# Patient Record
Sex: FEMALE | Race: OTHER | NOT HISPANIC OR LATINO | Employment: FULL TIME | URBAN - METROPOLITAN AREA
[De-identification: names, ages, dates, MRNs, and addresses within clinical notes are randomized per-mention and may not be internally consistent; named-entity substitution may affect disease eponyms.]

---

## 2017-08-01 ENCOUNTER — ALLSCRIPTS OFFICE VISIT (OUTPATIENT)
Dept: OTHER | Facility: OTHER | Age: 47
End: 2017-08-01

## 2017-08-01 LAB
BILIRUB UR QL STRIP: NORMAL
CLARITY UR: NORMAL
COLOR UR: YELLOW
GLUCOSE (HISTORICAL): NORMAL
HGB UR QL STRIP.AUTO: NORMAL
KETONES UR STRIP-MCNC: NORMAL MG/DL
LEUKOCYTE ESTERASE UR QL STRIP: NORMAL
NITRITE UR QL STRIP: NORMAL
PH UR STRIP.AUTO: 6 [PH]
PROT UR STRIP-MCNC: NORMAL MG/DL
SP GR UR STRIP.AUTO: 1.01
UROBILINOGEN UR QL STRIP.AUTO: 0.2

## 2017-09-05 ENCOUNTER — GENERIC CONVERSION - ENCOUNTER (OUTPATIENT)
Dept: OTHER | Facility: OTHER | Age: 47
End: 2017-09-05

## 2017-09-05 ENCOUNTER — ALLSCRIPTS OFFICE VISIT (OUTPATIENT)
Dept: OTHER | Facility: OTHER | Age: 47
End: 2017-09-05

## 2017-09-14 ENCOUNTER — GENERIC CONVERSION - ENCOUNTER (OUTPATIENT)
Dept: OTHER | Facility: OTHER | Age: 47
End: 2017-09-14

## 2018-01-12 VITALS
WEIGHT: 226 LBS | HEIGHT: 70 IN | BODY MASS INDEX: 32.35 KG/M2 | SYSTOLIC BLOOD PRESSURE: 157 MMHG | DIASTOLIC BLOOD PRESSURE: 80 MMHG

## 2018-01-13 VITALS — BODY MASS INDEX: 33.14 KG/M2 | DIASTOLIC BLOOD PRESSURE: 90 MMHG | WEIGHT: 231 LBS | SYSTOLIC BLOOD PRESSURE: 149 MMHG

## 2018-01-15 NOTE — MISCELLANEOUS
Message  [facility] called requesting refill of Aldatcone 100mg BID  RX  authorized for additional 3 month supply      Active Problems    1  Acute Cystitis (595 0)   2  Adult Gender Identity Disorder (302 85)   3  Gender dysphoria (302 6) (F64 9)   4  Skin disorder (709 9) (L98 9)   5  Urinary tract infection (599 0) (N39 0)   6  Vaginal infection (616 10) (N76 0)   7  Weight gain (783 1) (R63 5)    Current Meds   1  Aldactone 100 MG Oral Tablet (Spironolactone); TAKE ONE TABLET BY MOUTH TWICE   DAILY; Therapy: 04EWO7369 to (Last Rx:48Fgh6775)  Requested for: 63Bxq2609 Ordered   2  Aspirin 81 MG Oral Tablet; Therapy: 47LTZ1672 to Recorded   3  BD Luer-Oly Syringe 21G X 1" 3 ML Miscellaneous; USE AS DIRECTED; Therapy: 99ZNX7642 to (Last Rx:05Jan2015)  Requested for: 39ICF4130 Ordered   4  BD Luer-Oly Syringe 21G X 1" 3 ML Miscellaneous; USE AS DIRECTED; Therapy: 63QKN7062 to (Last Rx:11Jan2016)  Requested for: 85AOA5919 Ordered   5  Claritin 10 MG Oral Tablet; Therapy: (Recorded:16Oct2014) to Recorded   6  Estradiol Valerate 20 MG/ML Intramuscular Oil (Delestrogen); inject 1 ml IM every two   weeks; Therapy: 73Kjp7390 to (Evaluate:89Hni5743)  Requested for: 71GQF3237; Last   Rx:99Xwq5579 Ordered   7  Estradiol Valerate 20 MG/ML Intramuscular Oil; Inject 1ml Intramuscularly every 2 weeks; Therapy: 84PID3244 to (FWPTKXPI:43TDB7972)  Requested for: 27Jun2014; Last   Rx:27Jun2014 Ordered   8  Estradiol Valerate 40 MG/ML Intramuscular Oil; INJECT 1 ML INTRAMUSCULARLY   EVERY 2 WEEKS; Therapy: 69ZIQ0448 to (Evaluate:65Hxb6224)  Requested for: 10XLK7729; Last   Rx:88Vub6063 Ordered   9  Fenofibrate 160 MG Oral Tablet; Therapy: 92JGW2687 to (Last Rx:91Zqz2099)  Requested for: 45Lqo9400 Ordered   10  Ibuprofen 600 MG Oral Tablet; Therapy: 04BJW8167 to (Last Rx:08Feb2012)  Requested for: 25RDE6070 Ordered   11  Ibuprofen 800 MG Oral Tablet;     Therapy: 05ZEY8056 to (Last Rx:28Nov2011) Requested for: 63MOM7636 Ordered   12  Levofloxacin 500 MG Oral Tablet; Therapy: 85FNW3751 to (Last Rx:90Pos2839)  Requested for: 70Fiz6973 Ordered   13  Levothyroxine Sodium 175 MCG Oral Tablet; Therapy: 77KHB0205 to Recorded   14  Naproxen 500 MG Oral Tablet; TAKE 1 TABLET 3 TIMES DAILY AS NEEDED; Therapy: 15FMR3592 to (Evaluate:59Amg6563); Last Rx:29Sth1399 Ordered   15  Naproxen 500 MG Oral Tablet; Therapy: 96IHK4794 to Recorded   16  Ofloxacin 0 3 % Otic Solution; Therapy: 77NGD6137 to (Last [facility])  Requested for: 33WCO4242 Ordered   17  Phentermine HCl - 37 5 MG Oral Capsule (Adipex-P); TAKE 1 CAPSULE EVERY    MORNING BEFORE BREAKFAST; Therapy: 44XQU1328 to (Evaluate:52Fra7324); Last Rx:55Kgz5104 Ordered   18  Phentermine HCl - 37 5 MG Oral Tablet (Adipex-P); TAKE 1 TABLET DAILY AS    DIRECTED; Therapy: 52PMX6194 to (Evaluate:14Jan2015); Last Rx:47Rxi8221 Ordered   19  Premarin 1 25 MG Oral Tablet; TAKE TWO TABS TWICE A DAY; Therapy: 61ACS0502 to (Last Rx:23Gch1619) Ordered   20  Spironolactone 100 MG Oral Tablet (Aldactone); TAKE 1 TABLET TWICE DAILY; Therapy: 50JXA3257 to (Miguel Ángel Shown)  Requested for: 90OWW1680; Last    Rx:56Tqx1031; Status: ACTIVE - Retrospective Authorization Ordered   21  Spironolactone 100 MG Oral Tablet; Therapy: 09DKF9158 to (Last Rx:97Mpg8398)  Requested for: 32VHT5130 Ordered   22  Sulfamethoxazole-TMP -160 MG TABS; TAKE 1 TABLET TWICE DAILY; Therapy: 48ROX6775 to (Evaluate:07Rde2307); Last Rx:40Lfh9906 Ordered   23  Sulfamethoxazole-Trimethoprim 800-160 MG Oral Tablet (Bactrim DS); one tab po bid    for 7 days; Therapy: 39UKJ0027 to (Evaluate:12Dps4728)  Requested for: 68CMR6305; Last    Rx:50Jdm0259 Ordered   24  Sulfamethoxazole-Trimethoprim 800-160 MG Oral Tablet (Bactrim DS); one tab po bid    for 7 days; Therapy: 57OCJ7137 to (Evaluate:20Tgy9094)  Requested for: 28IZF7786; Last    Rx:19Wmx3021 Ordered   25   Syringe 21G X 1-1/2" 3 ML Miscellaneous; USE AS DIRECTED; Therapy: 75TVU7966 to (Last Rx:15Ska5094) Ordered   26  Syringe 21G X 1-1/2" 3 ML Miscellaneous; USE AS DIRECTED; Therapy: 85ELQ3923 to (Last Rx:36Ukx6926)  Requested for: 09Wqk1866 Ordered   27  Topiramate 50 MG Oral Tablet; Therapy: 15OAB9479 to (Last Ace James)  Requested for: 74Uxt7272 Ordered   28  Ventolin  (90 Base) MCG/ACT Inhalation Aerosol Solution; Therapy: 01KPE3026 to (Last Rx:41Jnf0738)  Requested for: 93Rvi4569 Ordered   29  Zoloft 25 MG Oral Tablet (Sertraline HCl); Therapy: 76SHG1909 to Recorded    Allergies    1  Penicillins    2   FRUIT    Signatures   Electronically signed by : Renata Bar RN; Feb 9 2016 12:23PM EST                       (Author)

## 2018-01-18 NOTE — MISCELLANEOUS
Message  additional 3 month supply of spironalactone 100mg BID called to [facility] pharmacy per request      Active Problems    1  Acute cystitis (595 0) (N30 00)   2  Adolescent or adult gender identity disorder (302 85) (F64 0)   3  Bronchitis with bronchospasm (490) (J20 9)   4  Gender dysphoria (302 6)   5  Skin disorder (709 9) (L98 9)   6  Urinary tract infection (599 0) (N39 0)   7  Vaginal infection (616 10) (N76 0)   8  Vaginitis (616 10) (N76 0)   9  Weight gain (783 1) (R63 5)    Current Meds   1  Aldactone 100 MG Oral Tablet (Spironolactone); TAKE ONE TABLET BY MOUTH TWICE   DAILY; Therapy: 66BEH9470 to (Last Rx:15Sep2011)  Requested for: 15Sep2011 Ordered   2  Aspirin 81 MG TABS; Therapy: 88GRE7277 to Recorded   3  Azithromycin 250 MG Oral Tablet (Zithromax Z-Martínez); TAKE AS DIRECTED; Therapy: 55GOR3519 to (Last Rx:05Sep2017)  Requested for: 05Sep2017 Ordered   4  BD Luer-Oly Syringe 21G X 1" 3 ML Miscellaneous; USE AS DIRECTED; Therapy: 03JSI6367 to (Last Rx:05Jan2015)  Requested for: 49JXC6788 Ordered   5  BD Luer-Oly Syringe 21G X 1" 3 ML Miscellaneous; USE AS DIRECTED; Therapy: 84KCH5216 to (Last Rx:11Jan2016)  Requested for: 63CUQ9793 Ordered   6  Claritin 10 MG Oral Tablet; Therapy: (Recorded:16Oct2014) to Recorded   7  Estrace 0 1 MG/GM Vaginal Cream; One vaginal application twice daily for 1 week then   once daily for the following week; Therapy: 47GVR7529 to (Last Rx:05Sep2017)  Requested for: 05Sep2017 Ordered   8  Estradiol Valerate 20 MG/ML Intramuscular Oil; Inject 1 ml every week; Therapy: 57EUP9373 to (Evaluate:42Hxo4169); Last Rx:05Sep2017 Ordered   9  Fenofibrate 160 MG Oral Tablet; Therapy: 44TMQ0194 to (Last Rx:02Feb2012)  Requested for: 64Aou6498 Ordered   10  Ibuprofen 600 MG Oral Tablet; Therapy: 75FZQ0096 to (Last Rx:60Iep2340)  Requested for: 08MXF6621 Ordered   11  Ibuprofen 800 MG Oral Tablet;     Therapy: 40ILX4019 to (Last Deborah Flow)  Requested for: 46FYC6448 Ordered   12  LevoFLOXacin 500 MG Oral Tablet; Therapy: 61UTQ2788 to (Last Rx:24Nrq5105)  Requested for: 09Aqr0556 Ordered   13  Levothyroxine Sodium 175 MCG Oral Tablet; Therapy: 00TAM0768 to Recorded   14  Naproxen 500 MG Oral Tablet; Therapy: 76UDX4600 to Recorded   15  Ofloxacin 0 3 % Otic Solution; Therapy: 27AEU9917 to (Last Sammy Antony)  Requested for: 11CUD4810 Ordered   16  Phentermine HCl - 37 5 MG Oral Capsule (Adipex-P); TAKE 1 CAPSULE EVERY    MORNING BEFORE BREAKFAST; Therapy: 21EKF8971 to (Evaluate:26Jjm2805); Last Rx:03Kjk9951 Ordered   17  Phentermine HCl - 37 5 MG Oral Tablet (Adipex-P); TAKE 1 TABLET DAILY AS    DIRECTED; Therapy: 71HYR5136 to (Evaluate:14Jan2015); Last Rx:60Mlv8905 Ordered   18  Premarin 1 25 MG Oral Tablet; take 2 tablet twice daily; Therapy: 96FYS0072 to ([facility])  Requested for: 74UJF2125; Last    Rx:06Jan2017 Ordered   19  Spironolactone 100 MG Oral Tablet; Therapy: 30BBC7430 to (Last Rx:08Pjs1197)  Requested for: 56MAB2733 Ordered   20  Syringe 21G X 1-1/2" 3 ML Miscellaneous; USE AS DIRECTED; Therapy: 05VXL8882 to (Last Rx:12Qxk9784) Ordered   21  Syringe 21G X 1-1/2" 3 ML Miscellaneous; USE AS DIRECTED; Therapy: 97ZRT6918 to (Last Rx:49Oog0703)  Requested for: 27Kfq7608 Ordered   22  Syringe 23G X 1" 3 ML Miscellaneous; USE AS DIRECTED; Therapy: 52GGT1756 to (Last Rx:30Exp1614)  Requested for: 01Zte6476 Ordered   23  Topiramate 50 MG Oral Tablet; Therapy: 23DAU2689 to (Last Vijay Krueger)  Requested for: 75Nry5722 Ordered   24  Ventolin  (90 Base) MCG/ACT Inhalation Aerosol Solution; Therapy: 65BLO6179 to (Last Rx:09Sax6011)  Requested for: 15Snm9225 Ordered   25  Zoloft 25 MG Oral Tablet (Sertraline HCl); Therapy: 44PUQ1464 to Recorded    Allergies    1  Keflex CAPS   2  Penicillins    3  Bee sting   4   FRUIT    Signatures   Electronically signed by : Elsa Payne RN; Sep 14 2017 12:22PM EST (Author)

## 2018-02-05 DIAGNOSIS — Z78.9 TRANSGENDER: Primary | ICD-10-CM

## 2018-02-08 RX ORDER — ESTROGENS, CONJUGATED 1.25 MG
TABLET ORAL
Qty: 360 TABLET | Refills: 3 | Status: SHIPPED | OUTPATIENT
Start: 2018-02-08 | End: 2019-04-12 | Stop reason: SDUPTHER

## 2018-02-20 ENCOUNTER — OFFICE VISIT (OUTPATIENT)
Dept: OBGYN CLINIC | Facility: CLINIC | Age: 48
End: 2018-02-20
Payer: COMMERCIAL

## 2018-02-20 VITALS
WEIGHT: 232 LBS | DIASTOLIC BLOOD PRESSURE: 75 MMHG | SYSTOLIC BLOOD PRESSURE: 147 MMHG | HEIGHT: 69 IN | BODY MASS INDEX: 34.36 KG/M2

## 2018-02-20 DIAGNOSIS — R39.9 UTI SYMPTOMS: Primary | ICD-10-CM

## 2018-02-20 DIAGNOSIS — J06.9 URI WITH COUGH AND CONGESTION: ICD-10-CM

## 2018-02-20 LAB
SL AMB  POCT GLUCOSE, UA: 4
SL AMB LEUKOCYTE ESTERASE,UA: NORMAL
SL AMB POCT BILIRUBIN,UA: NORMAL
SL AMB POCT BLOOD,UA: NORMAL
SL AMB POCT CLARITY,UA: NORMAL
SL AMB POCT COLOR,UA: NORMAL
SL AMB POCT KETONES,UA: NORMAL
SL AMB POCT NITRITE,UA: NORMAL
SL AMB POCT PH,UA: 6.5
SL AMB POCT SPECIFIC GRAVITY,UA: 1.02
SL AMB POCT URINE PROTEIN: NORMAL
SL AMB POCT UROBILINOGEN: 0.2

## 2018-02-20 PROCEDURE — 99214 OFFICE O/P EST MOD 30 MIN: CPT | Performed by: OBSTETRICS & GYNECOLOGY

## 2018-02-20 PROCEDURE — 87086 URINE CULTURE/COLONY COUNT: CPT | Performed by: OBSTETRICS & GYNECOLOGY

## 2018-02-20 PROCEDURE — 81002 URINALYSIS NONAUTO W/O SCOPE: CPT | Performed by: OBSTETRICS & GYNECOLOGY

## 2018-02-20 RX ORDER — ASPIRIN 81 MG/1
81 TABLET ORAL DAILY
COMMUNITY
End: 2019-08-28 | Stop reason: SDUPTHER

## 2018-02-20 RX ORDER — LEVOTHYROXINE SODIUM 175 UG/1
175 TABLET ORAL DAILY
COMMUNITY
End: 2018-08-22 | Stop reason: SDUPTHER

## 2018-02-20 RX ORDER — AZITHROMYCIN 250 MG/1
TABLET, FILM COATED ORAL
Qty: 6 TABLET | Refills: 0 | Status: SHIPPED | OUTPATIENT
Start: 2018-02-20 | End: 2018-02-25

## 2018-02-20 RX ORDER — ARIPIPRAZOLE 10 MG/1
10 TABLET ORAL DAILY
COMMUNITY
End: 2019-04-11

## 2018-02-20 RX ORDER — SPIRONOLACTONE 100 MG/1
100 TABLET, FILM COATED ORAL 2 TIMES DAILY
COMMUNITY
End: 2018-10-15

## 2018-02-20 RX ORDER — ESTRADIOL VALERATE 40 MG/ML
40 INJECTION INTRAMUSCULAR
COMMUNITY
End: 2018-07-20 | Stop reason: SDUPTHER

## 2018-02-20 RX ORDER — SULFAMETHOXAZOLE AND TRIMETHOPRIM 800; 160 MG/1; MG/1
TABLET ORAL
Qty: 14 TABLET | Refills: 0 | Status: SHIPPED | OUTPATIENT
Start: 2018-02-20 | End: 2018-07-20

## 2018-02-20 NOTE — PROGRESS NOTES
HPI: Pt c/o urinary burning and urgerncy for the last several days  Denies fever or chills   Also with dry cough     ROS: AS Per HPI    Exam:  Heent: wnl  Lungs few crackles a right base Rhonci through out  Abdomen Soft  CVA: non tender    A: UTI      URI    Plan: Bactrim DS            Urinary culture             Z pac            Need to get labs to chart form PCP

## 2018-02-22 LAB — BACTERIA UR CULT: NORMAL

## 2018-07-20 ENCOUNTER — OFFICE VISIT (OUTPATIENT)
Dept: OBGYN CLINIC | Facility: HOSPITAL | Age: 48
End: 2018-07-20
Payer: COMMERCIAL

## 2018-07-20 VITALS
DIASTOLIC BLOOD PRESSURE: 83 MMHG | SYSTOLIC BLOOD PRESSURE: 144 MMHG | HEIGHT: 71 IN | HEART RATE: 99 BPM | WEIGHT: 228.2 LBS | BODY MASS INDEX: 31.95 KG/M2

## 2018-07-20 DIAGNOSIS — Z11.3 SCREEN FOR SEXUALLY TRANSMITTED DISEASES: ICD-10-CM

## 2018-07-20 DIAGNOSIS — J40 BRONCHITIS: ICD-10-CM

## 2018-07-20 DIAGNOSIS — R39.89 POSSIBLE URINARY TRACT INFECTION: ICD-10-CM

## 2018-07-20 DIAGNOSIS — A64 SEXUALLY TRANSMITTED DISEASE (STD): ICD-10-CM

## 2018-07-20 DIAGNOSIS — Z78.9 TRANSGENDER: Primary | ICD-10-CM

## 2018-07-20 LAB
SL AMB  POCT GLUCOSE, UA: ABNORMAL
SL AMB LEUKOCYTE ESTERASE,UA: ABNORMAL
SL AMB POCT BILIRUBIN,UA: ABNORMAL
SL AMB POCT BLOOD,UA: ABNORMAL
SL AMB POCT CLARITY,UA: CLEAR
SL AMB POCT COLOR,UA: YELLOW
SL AMB POCT KETONES,UA: ABNORMAL
SL AMB POCT NITRITE,UA: ABNORMAL
SL AMB POCT PH,UA: 6.5
SL AMB POCT SPECIFIC GRAVITY,UA: 1.01
SL AMB POCT URINE PROTEIN: ABNORMAL
SL AMB POCT UROBILINOGEN: 0.2

## 2018-07-20 PROCEDURE — 87591 N.GONORRHOEAE DNA AMP PROB: CPT | Performed by: OBSTETRICS & GYNECOLOGY

## 2018-07-20 PROCEDURE — 99214 OFFICE O/P EST MOD 30 MIN: CPT | Performed by: OBSTETRICS & GYNECOLOGY

## 2018-07-20 PROCEDURE — 87491 CHLMYD TRACH DNA AMP PROBE: CPT | Performed by: OBSTETRICS & GYNECOLOGY

## 2018-07-20 PROCEDURE — 87086 URINE CULTURE/COLONY COUNT: CPT | Performed by: OBSTETRICS & GYNECOLOGY

## 2018-07-20 PROCEDURE — 81002 URINALYSIS NONAUTO W/O SCOPE: CPT | Performed by: OBSTETRICS & GYNECOLOGY

## 2018-07-20 RX ORDER — ASCORBIC ACID 500 MG
1000 TABLET ORAL DAILY
COMMUNITY
End: 2019-04-11

## 2018-07-20 RX ORDER — DIPHENOXYLATE HYDROCHLORIDE AND ATROPINE SULFATE 2.5; .025 MG/1; MG/1
1 TABLET ORAL DAILY
COMMUNITY
End: 2019-11-14

## 2018-07-20 RX ORDER — METRONIDAZOLE 500 MG/1
500 TABLET ORAL EVERY 8 HOURS SCHEDULED
Qty: 10 TABLET | Refills: 0 | Status: SHIPPED | OUTPATIENT
Start: 2018-07-20 | End: 2018-07-30

## 2018-07-20 RX ORDER — MELATONIN
1000 DAILY
COMMUNITY

## 2018-07-20 RX ORDER — AZITHROMYCIN 250 MG/1
TABLET, FILM COATED ORAL
Qty: 6 TABLET | Refills: 0 | Status: SHIPPED | OUTPATIENT
Start: 2018-07-20 | End: 2018-07-24

## 2018-07-20 RX ORDER — ESTRADIOL VALERATE 40 MG/ML
40 INJECTION INTRAMUSCULAR
Qty: 5 ML | Refills: 3 | Status: SHIPPED | OUTPATIENT
Start: 2018-07-20 | End: 2018-12-07 | Stop reason: SDUPTHER

## 2018-07-20 NOTE — PROGRESS NOTES
This well know TRF c/o vaginal discharge, cough, wanting STD screening    Exam:  heent WNL  LUNGS: SOME CRACKLES  PELVIC:   VAG: SOME AREAS OF EROSIONS    VAG DISCHARGE:     A: bRONCHITIS      VAG EROSIONS  PLAN:   STD screening   BV: Metronidazole   Bronhitis: Z pac   Vag erosions: Premarin   Blood scrrens

## 2018-07-21 LAB — BACTERIA UR CULT: NORMAL

## 2018-07-25 LAB
CHLAMYDIA DNA CVX QL NAA+PROBE: NORMAL
N GONORRHOEA DNA GENITAL QL NAA+PROBE: NORMAL

## 2018-07-30 ENCOUNTER — TELEPHONE (OUTPATIENT)
Dept: OBGYN CLINIC | Facility: HOSPITAL | Age: 48
End: 2018-07-30

## 2018-07-30 NOTE — TELEPHONE ENCOUNTER
Attempted to leave a voicemail as friendly reminder to complete active labs  No answer, unable to leave voicemail

## 2018-07-31 ENCOUNTER — APPOINTMENT (OUTPATIENT)
Dept: LAB | Facility: HOSPITAL | Age: 48
End: 2018-07-31
Attending: OBSTETRICS & GYNECOLOGY
Payer: COMMERCIAL

## 2018-07-31 ENCOUNTER — TRANSCRIBE ORDERS (OUTPATIENT)
Dept: LAB | Facility: HOSPITAL | Age: 48
End: 2018-07-31

## 2018-07-31 DIAGNOSIS — Z11.3 SCREEN FOR SEXUALLY TRANSMITTED DISEASES: ICD-10-CM

## 2018-07-31 DIAGNOSIS — Z78.9 TRANSGENDER: ICD-10-CM

## 2018-07-31 DIAGNOSIS — Z11.3 SCREENING EXAMINATION FOR VENEREAL DISEASE: Primary | ICD-10-CM

## 2018-07-31 LAB
ALBUMIN SERPL BCP-MCNC: 3.5 G/DL (ref 3.5–5)
ALP SERPL-CCNC: 56 U/L (ref 46–116)
ALT SERPL W P-5'-P-CCNC: 25 U/L (ref 12–78)
ANION GAP SERPL CALCULATED.3IONS-SCNC: 10 MMOL/L (ref 4–13)
AST SERPL W P-5'-P-CCNC: 14 U/L (ref 5–45)
BILIRUB SERPL-MCNC: 0.29 MG/DL (ref 0.2–1)
BUN SERPL-MCNC: 11 MG/DL (ref 5–25)
CALCIUM SERPL-MCNC: 9 MG/DL (ref 8.3–10.1)
CHLORIDE SERPL-SCNC: 97 MMOL/L (ref 100–108)
CHOLEST SERPL-MCNC: 415 MG/DL (ref 50–200)
CO2 SERPL-SCNC: 22 MMOL/L (ref 21–32)
CREAT SERPL-MCNC: 0.66 MG/DL (ref 0.6–1.3)
ERYTHROCYTE [DISTWIDTH] IN BLOOD BY AUTOMATED COUNT: 12.5 % (ref 11.6–15.1)
GFR SERPL CREATININE-BSD FRML MDRD: 106 ML/MIN/1.73SQ M
GLUCOSE P FAST SERPL-MCNC: 186 MG/DL (ref 65–99)
HCT VFR BLD AUTO: 41.5 % (ref 34.8–46.1)
HDLC SERPL-MCNC: 27 MG/DL (ref 40–60)
HGB BLD-MCNC: 13.5 G/DL (ref 11.5–15.4)
MCH RBC QN AUTO: 31 PG (ref 26.8–34.3)
MCHC RBC AUTO-ENTMCNC: 32.5 G/DL (ref 31.4–37.4)
MCV RBC AUTO: 95 FL (ref 82–98)
NONHDLC SERPL-MCNC: 388 MG/DL
PLATELET # BLD AUTO: 360 THOUSANDS/UL (ref 149–390)
PMV BLD AUTO: 10.7 FL (ref 8.9–12.7)
POTASSIUM SERPL-SCNC: 3.8 MMOL/L (ref 3.5–5.3)
PROT SERPL-MCNC: 7.4 G/DL (ref 6.4–8.2)
RBC # BLD AUTO: 4.36 MILLION/UL (ref 3.81–5.12)
SODIUM SERPL-SCNC: 129 MMOL/L (ref 136–145)
TRIGL SERPL-MCNC: 2439 MG/DL
WBC # BLD AUTO: 8.96 THOUSAND/UL (ref 4.31–10.16)

## 2018-07-31 PROCEDURE — 87389 HIV-1 AG W/HIV-1&-2 AB AG IA: CPT

## 2018-07-31 PROCEDURE — 80053 COMPREHEN METABOLIC PANEL: CPT

## 2018-07-31 PROCEDURE — 80061 LIPID PANEL: CPT

## 2018-07-31 PROCEDURE — 86592 SYPHILIS TEST NON-TREP QUAL: CPT

## 2018-07-31 PROCEDURE — 85027 COMPLETE CBC AUTOMATED: CPT

## 2018-07-31 PROCEDURE — 36415 COLL VENOUS BLD VENIPUNCTURE: CPT

## 2018-08-01 LAB
HIV 1+2 AB+HIV1 P24 AG SERPL QL IA: NORMAL
RPR SER QL: NORMAL

## 2018-08-10 ENCOUNTER — OFFICE VISIT (OUTPATIENT)
Dept: OBGYN CLINIC | Facility: HOSPITAL | Age: 48
End: 2018-08-10
Payer: COMMERCIAL

## 2018-08-10 VITALS
BODY MASS INDEX: 32.07 KG/M2 | WEIGHT: 224 LBS | DIASTOLIC BLOOD PRESSURE: 72 MMHG | SYSTOLIC BLOOD PRESSURE: 157 MMHG | HEART RATE: 114 BPM | HEIGHT: 70 IN

## 2018-08-10 DIAGNOSIS — F64.0: ICD-10-CM

## 2018-08-10 DIAGNOSIS — E78.1 HYPERTRIGLYCERIDEMIA: Primary | ICD-10-CM

## 2018-08-10 DIAGNOSIS — R73.02 GLUCOSE INTOLERANCE (IMPAIRED GLUCOSE TOLERANCE): ICD-10-CM

## 2018-08-10 PROCEDURE — 99214 OFFICE O/P EST MOD 30 MIN: CPT | Performed by: OBSTETRICS & GYNECOLOGY

## 2018-08-10 NOTE — PROGRESS NOTES
This is a 52 yr TGF here for follow up visit for labs    Glucose FBS: 186  Lipids: TG 2300, Cholesterol 410    Assesment:    Hypertrig, elvated BS    Plan;  Diet counseling/TG adjustment   Pt refuse decrease in Estrogen dose,    Rtn in one month after seeing PCP

## 2018-08-22 DIAGNOSIS — E03.8 OTHER SPECIFIED HYPOTHYROIDISM: Primary | ICD-10-CM

## 2018-08-22 RX ORDER — LEVOTHYROXINE SODIUM 175 UG/1
175 TABLET ORAL DAILY
Refills: 0 | Status: CANCELLED | OUTPATIENT
Start: 2018-08-22

## 2018-08-22 RX ORDER — LEVOTHYROXINE SODIUM 0.15 MG/1
150 TABLET ORAL DAILY
Qty: 30 TABLET | Refills: 0 | Status: SHIPPED | OUTPATIENT
Start: 2018-08-22 | End: 2018-08-25

## 2018-08-22 RX ORDER — LEVOTHYROXINE SODIUM 0.15 MG/1
150 TABLET ORAL DAILY
Qty: 30 TABLET | Refills: 0 | Status: SHIPPED | OUTPATIENT
Start: 2018-08-22 | End: 2018-08-27 | Stop reason: SDUPTHER

## 2018-08-22 NOTE — PROGRESS NOTES
Pt requesting refill for Levothyroxine 150 mcg, ran out 3 days ago  Has appt to see Dr Pacheco Ellis 9/25/18  Pt needs recent TSH to adjust the levothyroxine dose if needed

## 2018-08-25 DIAGNOSIS — E78.2 MIXED HYPERLIPIDEMIA: Primary | ICD-10-CM

## 2018-08-25 RX ORDER — FENOFIBRATE 145 MG/1
145 TABLET, COATED ORAL DAILY
Qty: 30 TABLET | Refills: 3 | Status: SHIPPED | OUTPATIENT
Start: 2018-08-25 | End: 2018-10-15 | Stop reason: SDUPTHER

## 2018-08-27 DIAGNOSIS — M54.50 ACUTE BILATERAL LOW BACK PAIN WITHOUT SCIATICA: Primary | ICD-10-CM

## 2018-08-27 DIAGNOSIS — E03.8 OTHER SPECIFIED HYPOTHYROIDISM: ICD-10-CM

## 2018-08-27 RX ORDER — LEVOTHYROXINE SODIUM 0.15 MG/1
150 TABLET ORAL DAILY
Qty: 90 TABLET | Refills: 1 | Status: SHIPPED | OUTPATIENT
Start: 2018-08-27 | End: 2018-09-24 | Stop reason: SDUPTHER

## 2018-08-27 RX ORDER — NAPROXEN 500 MG/1
250 TABLET ORAL 2 TIMES DAILY WITH MEALS
Qty: 14 TABLET | Refills: 0 | Status: SHIPPED | OUTPATIENT
Start: 2018-08-27 | End: 2019-04-11

## 2018-08-27 NOTE — TELEPHONE ENCOUNTER
Pt is as well requesting some type of pain medication for her arm and back pain  Pt would like if you can send a 90 day supply for her levothyroxine  I have schedule pt to see dr Gustavo Matthew for Friday since was requesting to have an appt sooner than 9/27/2018 since she will be starting a new job and wont be able to take off of work

## 2018-08-31 ENCOUNTER — OFFICE VISIT (OUTPATIENT)
Dept: FAMILY MEDICINE CLINIC | Facility: CLINIC | Age: 48
End: 2018-08-31
Payer: COMMERCIAL

## 2018-08-31 VITALS
TEMPERATURE: 97.4 F | BODY MASS INDEX: 31.71 KG/M2 | OXYGEN SATURATION: 98 % | SYSTOLIC BLOOD PRESSURE: 152 MMHG | HEART RATE: 93 BPM | DIASTOLIC BLOOD PRESSURE: 94 MMHG | RESPIRATION RATE: 16 BRPM | WEIGHT: 221 LBS

## 2018-08-31 DIAGNOSIS — R73.02 GLUCOSE INTOLERANCE (IMPAIRED GLUCOSE TOLERANCE): ICD-10-CM

## 2018-08-31 DIAGNOSIS — E03.9 HYPOTHYROIDISM: Primary | ICD-10-CM

## 2018-08-31 DIAGNOSIS — E11.9 DM (DIABETES MELLITUS) (HCC): ICD-10-CM

## 2018-08-31 DIAGNOSIS — E78.5 HYPERLIPIDEMIA, UNSPECIFIED HYPERLIPIDEMIA TYPE: ICD-10-CM

## 2018-08-31 PROCEDURE — 99213 OFFICE O/P EST LOW 20 MIN: CPT | Performed by: FAMILY MEDICINE

## 2018-08-31 RX ORDER — LEVOTHYROXINE SODIUM 175 UG/1
TABLET ORAL
COMMUNITY
Start: 2015-07-14 | End: 2018-09-25 | Stop reason: SDUPTHER

## 2018-08-31 RX ORDER — FENOFIBRATE 160 MG/1
TABLET ORAL
COMMUNITY
Start: 2012-02-02 | End: 2018-10-15

## 2018-08-31 RX ORDER — ESTRADIOL VALERATE 20 MG/ML
INJECTION INTRAMUSCULAR
COMMUNITY
Start: 2017-09-05 | End: 2019-03-01

## 2018-08-31 RX ORDER — ALBUTEROL SULFATE 90 UG/1
AEROSOL, METERED RESPIRATORY (INHALATION)
COMMUNITY
Start: 2017-12-06 | End: 2019-01-24 | Stop reason: SDUPTHER

## 2018-08-31 RX ORDER — SPIRONOLACTONE 100 MG/1
1 TABLET, FILM COATED ORAL 2 TIMES DAILY
COMMUNITY
Start: 2011-09-15 | End: 2019-02-02 | Stop reason: SDUPTHER

## 2018-08-31 RX ORDER — ESTRADIOL 0.1 MG/G
CREAM VAGINAL
COMMUNITY
Start: 2017-09-05 | End: 2021-03-05 | Stop reason: SINTOL

## 2018-08-31 NOTE — PROGRESS NOTES
Assessment/Plan:    Hypothyroidism  Will check TSH as patient has hypertriglyceridemia   - Patient says she takes Levothyroxine 150 mg    Glucose intolerance (impaired glucose tolerance)  Will check Hba1C    Hyperlipidemia  Patient has elevated triglyceride and HLD  - Maybe 2/2 DM, will check hba1c  - Currently on fenofibrate        Diagnoses and all orders for this visit:    Hypothyroidism  -     TSH, 3rd generation with Free T4 reflex; Future    Glucose intolerance (impaired glucose tolerance)  -     Hemoglobin A1C; Future    Hyperlipidemia, unspecified hyperlipidemia type    Other orders  -     Aspirin (ASPIR-81 PO); Take 81 mg by mouth  -     estradiol (ESTRACE VAGINAL) 0 1 mg/g vaginal cream; Insert into the vagina  -     albuterol (VENTOLIN HFA) 90 mcg/act inhaler; inhale 2 puff by inhalation route  every 4 - 6 hours as needed  -     spironolactone (ALDACTONE) 100 mg tablet; Take 1 tablet by mouth 2 (two) times a day  -     estradiol valerate (DELESTROGEN) 20 MG/ML injection; Inject into a muscle  -     fenofibrate (TRIGLIDE) 160 MG tablet; Take by mouth  -     levothyroxine 175 mcg tablet; Take by mouth          Subjective:      Patient ID: Delma Sandoval is a 52 y o  female  This is a very pleasant 27-year-old female who is here to the clinic for evaluation treatment of chronic medical conditions  Patient was at her gyn doctor who ordered some labs and patient was found to have hyperlipidemia with a very elevated triglyceride  Patient also has significant past medical history of hypothyroidism, hypertension, pre diabetes  Patient has lost 18 pounds intentionally  Patient denies any kind of chest pain shortness of breath fever chills nausea or vomiting          The following portions of the patient's history were reviewed and updated as appropriate: allergies, current medications, past family history, past medical history, past social history, past surgical history and problem list     Review of Systems   Constitutional: Negative for activity change, appetite change, fatigue and fever  HENT: Negative for congestion, sore throat and trouble swallowing  Eyes: Negative for pain, discharge and visual disturbance  Respiratory: Negative for apnea, chest tightness and wheezing  Cardiovascular: Negative for chest pain, palpitations and leg swelling  Gastrointestinal: Negative for abdominal distention, abdominal pain, blood in stool, constipation and nausea  Endocrine: Negative for cold intolerance and heat intolerance  Genitourinary: Negative for difficulty urinating, dysuria, frequency, hematuria, pelvic pain and urgency  Musculoskeletal: Negative for arthralgias, back pain and gait problem  Skin: Negative for color change, rash and wound  Allergic/Immunologic: Negative for environmental allergies, food allergies and immunocompromised state  Neurological: Negative for dizziness, tremors, syncope and headaches  Hematological: Negative for adenopathy  Psychiatric/Behavioral: Negative for agitation and behavioral problems  Objective:      /94 (BP Location: Left arm, Patient Position: Sitting, Cuff Size: Standard)   Pulse 93   Temp (!) 97 4 °F (36 3 °C) (Temporal)   Resp 16   Wt 100 kg (221 lb)   SpO2 98%   BMI 31 71 kg/m²          Physical Exam   Constitutional: She is oriented to person, place, and time  She appears well-developed and well-nourished  No distress  HENT:   Head: Normocephalic and atraumatic  Right Ear: External ear normal    Left Ear: External ear normal    Nose: Nose normal    Mouth/Throat: Oropharynx is clear and moist    Eyes: Conjunctivae and EOM are normal  Pupils are equal, round, and reactive to light  Right eye exhibits no discharge  Left eye exhibits no discharge  Neck: Normal range of motion  Neck supple  No JVD present  No tracheal deviation present  No thyromegaly present     Cardiovascular: Normal rate, regular rhythm, normal heart sounds and intact distal pulses  Exam reveals no gallop and no friction rub  No murmur heard  Pulmonary/Chest: Effort normal and breath sounds normal  No respiratory distress  She has no wheezes  She exhibits no tenderness  Abdominal: Soft  Bowel sounds are normal  She exhibits no distension  There is no tenderness  There is no rebound  Musculoskeletal: Normal range of motion  She exhibits no edema or tenderness  Neurological: She is alert and oriented to person, place, and time  She has normal reflexes  Coordination normal    Skin: Skin is warm and dry  No rash noted  She is not diaphoretic  No erythema  Psychiatric: She has a normal mood and affect   Her behavior is normal  Thought content normal

## 2018-08-31 NOTE — ASSESSMENT & PLAN NOTE
Patient has elevated triglyceride and HLD  - Maybe 2/2 DM, will check hba1c  - Currently on fenofibrate

## 2018-09-13 ENCOUNTER — LAB (OUTPATIENT)
Dept: LAB | Facility: HOSPITAL | Age: 48
End: 2018-09-13
Payer: COMMERCIAL

## 2018-09-13 DIAGNOSIS — E03.9 HYPOTHYROIDISM: ICD-10-CM

## 2018-09-13 DIAGNOSIS — R73.02 GLUCOSE INTOLERANCE (IMPAIRED GLUCOSE TOLERANCE): ICD-10-CM

## 2018-09-13 LAB
EST. AVERAGE GLUCOSE BLD GHB EST-MCNC: 197 MG/DL
HBA1C MFR BLD: 8.5 % (ref 4.2–6.3)
TSH SERPL DL<=0.05 MIU/L-ACNC: 2.61 UIU/ML (ref 0.47–4.68)

## 2018-09-13 PROCEDURE — 84443 ASSAY THYROID STIM HORMONE: CPT

## 2018-09-13 PROCEDURE — 36415 COLL VENOUS BLD VENIPUNCTURE: CPT

## 2018-09-13 PROCEDURE — 83036 HEMOGLOBIN GLYCOSYLATED A1C: CPT

## 2018-09-24 ENCOUNTER — TELEPHONE (OUTPATIENT)
Dept: FAMILY MEDICINE CLINIC | Facility: CLINIC | Age: 48
End: 2018-09-24

## 2018-09-24 DIAGNOSIS — E03.8 OTHER SPECIFIED HYPOTHYROIDISM: ICD-10-CM

## 2018-09-24 RX ORDER — LEVOTHYROXINE SODIUM 0.15 MG/1
150 TABLET ORAL DAILY
Qty: 90 TABLET | Refills: 0 | Status: SHIPPED | OUTPATIENT
Start: 2018-09-24 | End: 2018-09-25 | Stop reason: SDUPTHER

## 2018-09-25 DIAGNOSIS — E03.8 OTHER SPECIFIED HYPOTHYROIDISM: ICD-10-CM

## 2018-09-25 RX ORDER — LEVOTHYROXINE SODIUM 0.15 MG/1
150 TABLET ORAL DAILY
Qty: 90 TABLET | Refills: 0 | Status: SHIPPED | OUTPATIENT
Start: 2018-09-25 | End: 2018-10-15 | Stop reason: SDUPTHER

## 2018-09-26 ENCOUNTER — OFFICE VISIT (OUTPATIENT)
Dept: FAMILY MEDICINE CLINIC | Facility: CLINIC | Age: 48
End: 2018-09-26
Payer: COMMERCIAL

## 2018-09-26 VITALS
RESPIRATION RATE: 20 BRPM | SYSTOLIC BLOOD PRESSURE: 150 MMHG | WEIGHT: 219 LBS | DIASTOLIC BLOOD PRESSURE: 88 MMHG | TEMPERATURE: 97.9 F | BODY MASS INDEX: 31.42 KG/M2

## 2018-09-26 DIAGNOSIS — R05.9 COUGH: ICD-10-CM

## 2018-09-26 DIAGNOSIS — Z72.0 TOBACCO ABUSE: ICD-10-CM

## 2018-09-26 DIAGNOSIS — A08.4 VIRAL GASTROENTERITIS: ICD-10-CM

## 2018-09-26 DIAGNOSIS — J06.9 VIRAL UPPER RESPIRATORY ILLNESS: Primary | ICD-10-CM

## 2018-09-26 PROCEDURE — 99214 OFFICE O/P EST MOD 30 MIN: CPT | Performed by: FAMILY MEDICINE

## 2018-09-26 NOTE — ASSESSMENT & PLAN NOTE
- Most likely resolving as patient has had no episodes of vomiting or diarrhea today  - She has been able to tolerate diet  - Advised patient to remain hydrated and eat bland diet today and gradually progress to regular diet  - Call office for worsening symptoms

## 2018-09-26 NOTE — ASSESSMENT & PLAN NOTE
- Counseled patient to quit and discussed that her cough is chronic most likely secondary to smoking cigarettes  - Explained harmful affects of smoking to patient  - Encouraged to wuit

## 2018-09-26 NOTE — PATIENT INSTRUCTIONS
Acute Nausea and Vomiting   WHAT YOU NEED TO KNOW:   Acute nausea and vomiting start suddenly, worsen quickly, and last a short time  DISCHARGE INSTRUCTIONS:   Return to the emergency department if:   · You see blood in your vomit or your bowel movements  · You have sudden, severe pain in your chest and upper abdomen after hard vomiting or retching  · You have swelling in your neck and chest      · You are dizzy, cold, and thirsty and your eyes and mouth are dry  · You are urinating very little or not at all  · You have muscle weakness, leg cramps, and trouble breathing  · Your heart is beating much faster than normal      · You continue to vomit for more than 48 hours  Contact your healthcare provider if:   · You have frequent dry heaves (vomiting but nothing comes out)  · Your nausea and vomiting does not get better or go away after you use medicine  · You have questions or concerns about your condition or treatment  Medicines: You may need any of the following:  · Medicines  may be given to calm your stomach and stop your vomiting  You may also need medicines to help you feel more relaxed or to stop nausea and vomiting caused by motion sickness  · Gastrointestinal stimulants  are used to help empty your stomach and bowels  This may help decrease nausea and vomiting  · Take your medicine as directed  Contact your healthcare provider if you think your medicine is not helping or if you have side effects  Tell him or her if you are allergic to any medicine  Keep a list of the medicines, vitamins, and herbs you take  Include the amounts, and when and why you take them  Bring the list or the pill bottles to follow-up visits  Carry your medicine list with you in case of an emergency  Prevent or manage acute nausea and vomiting:   · Do not drink alcohol  Alcohol may upset or irritate your stomach  Too much alcohol can also cause acute nausea and vomiting  · Control stress    Headaches due to stress may cause nausea and vomiting  Find ways to relax and manage your stress  Get more rest and sleep  · Drink more liquids as directed  Vomiting can lead to dehydration  It is important to drink more liquids to help replace lost body fluids  Ask your healthcare provider how much liquid to drink each day and which liquids are best for you  Your provider may recommend that you drink an oral rehydration solution (ORS)  ORS contains water, salts, and sugar that are needed to replace the lost body fluids  Ask what kind of ORS to use, how much to drink, and where to get it  · Eat smaller meals, more often  Eat small amounts of food every 2 to 3 hours, even if you are not hungry  Food in your stomach may decrease your nausea  · Talk to your healthcare provider before you take over-the-counter (OTC) medicines  These medicines can cause serious problems if you use certain other medicines, or you have a medical condition  You may have problems if you use too much or use them for longer than the label says  Follow directions on the label carefully  Follow up with your healthcare provider as directed:  Write down your questions so you remember to ask them during your follow-up visits  © 2017 [employer] Information is for End User's use only and may not be sold, redistributed or otherwise used for commercial purposes  All illustrations and images included in CareNotes® are the copyrighted property of [employer]  or [employer]  The above information is an  only  It is not intended as medical advice for individual conditions or treatments  Talk to your doctor, nurse or pharmacist before following any medical regimen to see if it is safe and effective for you

## 2018-09-26 NOTE — ASSESSMENT & PLAN NOTE
- Discussed with patient that her symptoms are most likley viral in etiology: absence of fever, normal physical exam  Her rash is unrelated to her condition and probably related to her recurrent use of permethrin and botox on her face  - Explained to patient that her cough is chronic and related to her smoking   On review of records, it was noted that her cough has been present > 7 months and CXR previously have been negative  - Advised patient to call office for worsening symptoms

## 2018-09-26 NOTE — PROGRESS NOTES
Assessment/Plan:    Tobacco abuse  - Counseled patient to quit and discussed that her cough is chronic most likely secondary to smoking cigarettes  - Explained harmful affects of smoking to patient  - Encouraged to wuit    Viral gastroenteritis  - Most likely resolving as patient has had no episodes of vomiting or diarrhea today  - She has been able to tolerate diet  - Advised patient to remain hydrated and eat bland diet today and gradually progress to regular diet  - Call office for worsening symptoms    Viral upper respiratory illness  - Discussed with patient that her symptoms are most likley viral in etiology: absence of fever, normal physical exam  Her rash is unrelated to her condition and probably related to her recurrent use of permethrin and botox on her face  - Explained to patient that her cough is chronic and related to her smoking  On review of records, it was noted that her cough has been present > 7 months and CXR previously have been negative  - Advised patient to call office for worsening symptoms       Diagnoses and all orders for this visit:    Viral upper respiratory illness    Viral gastroenteritis    Cough    Tobacco abuse          Subjective:      Patient ID: Librado Hay is a 52 y o  female  47F here as sick visit  Reports 2 day history of multiple episodes of food containing vomitus  Most recent vomitus was last night, none since this AM  This AM she ate toast and tea as well as crackers  Yesterday she had 2 episodes of diarrhea but none today  Also one episode of T 100 0 yesterday night  Also reports of rash to L cheek  Denies runny nose, sore throat  Multiple people at work have similar symptoms and have been calling out  Also reports of 2 days of dry cough  Smokes 5-7 cigarettes a day  Also reports of rash to L upper cheek   Uses permethrin cream and botox on her face          The following portions of the patient's history were reviewed and updated as appropriate: allergies, current medications, past family history, past medical history, past social history, past surgical history and problem list     Review of Systems   Constitutional: Negative for fever  HENT: Negative for congestion, ear pain, facial swelling, rhinorrhea, sinus pain, sinus pressure and sore throat  Respiratory: Positive for cough  Negative for shortness of breath  Gastrointestinal: Positive for diarrhea  Skin: Positive for rash  Objective:      /88 (BP Location: Right arm, Patient Position: Sitting, Cuff Size: Standard)   Temp 97 9 °F (36 6 °C) (Temporal)   Resp 20   Wt 99 3 kg (219 lb)   BMI 31 42 kg/m²          Physical Exam   Constitutional: She appears well-developed and well-nourished  HENT:   Head: Normocephalic and atraumatic  Right Ear: Tympanic membrane, external ear and ear canal normal    Left Ear: Tympanic membrane, external ear and ear canal normal    Nose: No mucosal edema, rhinorrhea or sinus tenderness  Mouth/Throat: No uvula swelling  Eyes: EOM are normal    Neck: Normal range of motion  Neck supple  Cardiovascular: Normal rate and normal heart sounds  Pulmonary/Chest: Effort normal and breath sounds normal  No respiratory distress  Abdominal: Soft  Bowel sounds are normal  She exhibits no distension  Neurological: She is alert  Skin: Skin is warm and dry  Psychiatric: She has a normal mood and affect

## 2018-10-04 ENCOUNTER — OFFICE VISIT (OUTPATIENT)
Dept: FAMILY MEDICINE CLINIC | Facility: CLINIC | Age: 48
End: 2018-10-04
Payer: COMMERCIAL

## 2018-10-04 VITALS
DIASTOLIC BLOOD PRESSURE: 84 MMHG | WEIGHT: 212 LBS | BODY MASS INDEX: 30.42 KG/M2 | SYSTOLIC BLOOD PRESSURE: 130 MMHG | TEMPERATURE: 97.8 F | RESPIRATION RATE: 18 BRPM

## 2018-10-04 DIAGNOSIS — A08.4 VIRAL GASTROENTERITIS: ICD-10-CM

## 2018-10-04 DIAGNOSIS — J40 BRONCHITIS: Primary | ICD-10-CM

## 2018-10-04 PROBLEM — J06.9 VIRAL UPPER RESPIRATORY ILLNESS: Status: RESOLVED | Noted: 2018-09-26 | Resolved: 2018-10-04

## 2018-10-04 PROCEDURE — 99213 OFFICE O/P EST LOW 20 MIN: CPT | Performed by: FAMILY MEDICINE

## 2018-10-04 RX ORDER — AZITHROMYCIN 250 MG/1
250 TABLET, FILM COATED ORAL EVERY 24 HOURS
Qty: 6 TABLET | Refills: 0 | Status: SHIPPED | OUTPATIENT
Start: 2018-10-04 | End: 2018-10-09

## 2018-10-04 NOTE — PROGRESS NOTES
Assessment/Plan:    Bronchitis  Patient with symptoms of a cough with productive sputum for over 2 weeks  Therefore will treat patient with a course of azithromycin  Advised patient to stay well hydrated  Viral gastroenteritis  Advised patient to continue fluids as tolerated  Monitor for any alarming symptoms such as blood, extreme pain or follow smelling stool  Patient was instructed to go to the ER if such  Diagnoses and all orders for this visit:    Bronchitis  -     azithromycin (ZITHROMAX) 250 mg tablet; Take 1 tablet (250 mg total) by mouth every 24 hours for 5 days Take 2 tablets on day 1 then 1 tablet for 4 days  Viral gastroenteritis          Subjective:      Patient ID: Ephraim Ramachandran is a 52 y o  female  This is a pleasant 47yr old F who presents to the clinic for rhinorrhea, cough productive of yellow sputum, diarrhea, muscle aches and feeling tired  Patient was seen one week ago in the clinic and given instructions on supportive care  Since then she has not noticed any improvement in her symptoms and states that she would like something to make her feel better  Denies any fever, chills, headache or sick contacts  The following portions of the patient's history were reviewed and updated as appropriate:   She  has a past medical history of Seizure disorder ([facility] [address] )  She   Patient Active Problem List    Diagnosis Date Noted    Bronchitis 10/04/2018    Viral gastroenteritis 09/26/2018    Cough 09/26/2018    Tobacco abuse 09/26/2018    Hyperlipidemia 08/10/2018    Glucose intolerance (impaired glucose tolerance) 08/10/2018    Hypothyroidism 10/15/2014    Adolescent or adult gender identity disorder 09/18/2013     She  has a past surgical history that includes Tonsillectomy  Her family history includes Cancer in her family; Depression in her family; Diabetes in her family; Hypertension in her family; Mental illness in her family; Stroke in her family    She  reports that she has been smoking Cigarettes  She uses smokeless tobacco  She reports that she drinks alcohol  She reports that she does not use drugs  Current Outpatient Prescriptions   Medication Sig Dispense Refill    albuterol (VENTOLIN HFA) 90 mcg/act inhaler inhale 2 puff by inhalation route  every 4 - 6 hours as needed      ARIPiprazole (ABILIFY) 10 mg tablet Take 10 mg by mouth daily      ascorbic acid (VITAMIN C) 500 mg tablet Take 1,000 mg by mouth daily      Aspirin (ASPIR-81 PO) Take 81 mg by mouth      aspirin (ECOTRIN LOW STRENGTH) 81 mg EC tablet Take 81 mg by mouth daily      azithromycin (ZITHROMAX) 250 mg tablet Take 1 tablet (250 mg total) by mouth every 24 hours for 5 days Take 2 tablets on day 1 then 1 tablet for 4 days   6 tablet 0    cholecalciferol (VITAMIN D3) 1,000 units tablet Take 1,000 Units by mouth daily      COLLAGEN PO Take by mouth daily at bedtime      conjugated estrogens (PREMARIN) vaginal cream Insert 1 g into the vagina 2 (two) times a day for 10 days 42 5 g 0    cyanocobalamin 500 MCG tablet Take 500 mcg by mouth daily      estradiol (ESTRACE VAGINAL) 0 1 mg/g vaginal cream Insert into the vagina      estradiol valerate (DELESTROGEN) 20 MG/ML injection Inject into a muscle      estradiol valerate (DELESTROGEN) 40 MG/ML injection Inject 1 mL (40 mg total) into a muscle every 14 (fourteen) days 5 mL 3    fenofibrate (TRICOR) 145 mg tablet Take 1 tablet (145 mg total) by mouth daily 30 tablet 3    fenofibrate (TRIGLIDE) 160 MG tablet Take by mouth      levothyroxine 150 mcg tablet Take 1 tablet (150 mcg total) by mouth daily 90 tablet 0    metFORMIN (GLUCOPHAGE) 500 mg tablet Take 1 tablet (500 mg total) by mouth 2 (two) times a day with meals 60 tablet 2    multivitamin (THERAGRAN) TABS Take 1 tablet by mouth daily      naproxen (NAPROSYN) 500 mg tablet Take 0 5 tablets (250 mg total) by mouth 2 (two) times a day with meals 14 tablet 0    PREMARIN 1 25 MG tablet TAKE 2 TABLETS BY MOUTH TWICE DAILY 360 tablet 3    spironolactone (ALDACTONE) 100 mg tablet Take 100 mg by mouth 2 (two) times a day      spironolactone (ALDACTONE) 100 mg tablet Take 1 tablet by mouth 2 (two) times a day       No current facility-administered medications for this visit  Review of Systems   Constitutional: Positive for fatigue  Negative for appetite change  HENT: Positive for rhinorrhea  Negative for congestion, hearing loss and trouble swallowing  Respiratory: Negative for chest tightness  Cardiovascular: Negative for palpitations and leg swelling  Gastrointestinal: Negative for abdominal pain, anal bleeding, nausea and vomiting  Endocrine: Negative for cold intolerance, heat intolerance, polydipsia, polyphagia and polyuria  Genitourinary: Negative for difficulty urinating, pelvic pain, vaginal bleeding and vaginal discharge  Musculoskeletal: Positive for arthralgias  Negative for back pain, joint swelling and neck stiffness  Neurological: Negative for dizziness, syncope and numbness  Objective:      /84 (BP Location: Right arm, Patient Position: Sitting, Cuff Size: Large)   Temp 97 8 °F (36 6 °C) (Temporal)   Resp 18   Wt 96 2 kg (212 lb)   BMI 30 42 kg/m²          Physical Exam   Constitutional: She is oriented to person, place, and time  She appears well-developed and well-nourished  No distress  HENT:   Head: Normocephalic and atraumatic  Eyes: Pupils are equal, round, and reactive to light  Neck: Normal range of motion  Neck supple  Cardiovascular: Normal rate, regular rhythm and normal heart sounds  Exam reveals no gallop and no friction rub  No murmur heard  Pulmonary/Chest: Effort normal and breath sounds normal  No respiratory distress  Abdominal: Soft  Bowel sounds are normal  She exhibits no distension  Musculoskeletal: Normal range of motion  Neurological: She is alert and oriented to person, place, and time     Skin: Skin is warm    Vitals reviewed

## 2018-10-04 NOTE — ASSESSMENT & PLAN NOTE
Advised patient to continue fluids as tolerated  Monitor for any alarming symptoms such as blood, extreme pain or follow smelling stool  Patient was instructed to go to the ER if such

## 2018-10-04 NOTE — ASSESSMENT & PLAN NOTE
Patient with symptoms of a cough with productive sputum for over 2 weeks  Therefore will treat patient with a course of azithromycin  Advised patient to stay well hydrated

## 2018-10-04 NOTE — PATIENT INSTRUCTIONS
Patient instructed on brat diet  Which includes banana, bread and non citric beverages or sodas  Patient was instructed on maintaining adequate hydration with fluids

## 2018-10-09 ENCOUNTER — TELEPHONE (OUTPATIENT)
Dept: FAMILY MEDICINE CLINIC | Facility: CLINIC | Age: 48
End: 2018-10-09

## 2018-10-09 NOTE — TELEPHONE ENCOUNTER
----- Message from Laurel Bob MD sent at 9/14/2018  1:40 PM EDT -----  Please schedule patient for appt with me if they are not already scheduled

## 2018-10-15 ENCOUNTER — OFFICE VISIT (OUTPATIENT)
Dept: FAMILY MEDICINE CLINIC | Facility: CLINIC | Age: 48
End: 2018-10-15
Payer: COMMERCIAL

## 2018-10-15 VITALS
RESPIRATION RATE: 18 BRPM | BODY MASS INDEX: 32.14 KG/M2 | WEIGHT: 224 LBS | SYSTOLIC BLOOD PRESSURE: 160 MMHG | TEMPERATURE: 97.2 F | DIASTOLIC BLOOD PRESSURE: 84 MMHG

## 2018-10-15 DIAGNOSIS — E03.9 ACQUIRED HYPOTHYROIDISM: ICD-10-CM

## 2018-10-15 DIAGNOSIS — E78.2 MIXED HYPERLIPIDEMIA: ICD-10-CM

## 2018-10-15 DIAGNOSIS — Z23 ENCOUNTER FOR IMMUNIZATION: ICD-10-CM

## 2018-10-15 DIAGNOSIS — I10 ESSENTIAL HYPERTENSION: ICD-10-CM

## 2018-10-15 DIAGNOSIS — E78.5 HYPERLIPIDEMIA, UNSPECIFIED HYPERLIPIDEMIA TYPE: ICD-10-CM

## 2018-10-15 DIAGNOSIS — E03.8 OTHER SPECIFIED HYPOTHYROIDISM: ICD-10-CM

## 2018-10-15 DIAGNOSIS — E11.9 TYPE 2 DIABETES MELLITUS WITHOUT COMPLICATION, WITHOUT LONG-TERM CURRENT USE OF INSULIN (HCC): Primary | ICD-10-CM

## 2018-10-15 PROBLEM — J40 BRONCHITIS: Status: RESOLVED | Noted: 2018-10-04 | Resolved: 2018-10-15

## 2018-10-15 PROBLEM — R05.9 COUGH: Status: RESOLVED | Noted: 2018-09-26 | Resolved: 2018-10-15

## 2018-10-15 PROBLEM — A08.4 VIRAL GASTROENTERITIS: Status: RESOLVED | Noted: 2018-09-26 | Resolved: 2018-10-15

## 2018-10-15 PROCEDURE — 90471 IMMUNIZATION ADMIN: CPT | Performed by: FAMILY MEDICINE

## 2018-10-15 PROCEDURE — 4010F ACE/ARB THERAPY RXD/TAKEN: CPT | Performed by: FAMILY MEDICINE

## 2018-10-15 PROCEDURE — 90682 RIV4 VACC RECOMBINANT DNA IM: CPT | Performed by: FAMILY MEDICINE

## 2018-10-15 PROCEDURE — 99213 OFFICE O/P EST LOW 20 MIN: CPT | Performed by: FAMILY MEDICINE

## 2018-10-15 RX ORDER — LISINOPRIL 5 MG/1
5 TABLET ORAL DAILY
Qty: 30 TABLET | Refills: 1 | Status: SHIPPED | OUTPATIENT
Start: 2018-10-15 | End: 2019-01-24

## 2018-10-15 RX ORDER — AZITHROMYCIN 250 MG/1
250 TABLET, FILM COATED ORAL EVERY 24 HOURS
Qty: 5 TABLET | Refills: 0 | Status: CANCELLED | OUTPATIENT
Start: 2018-10-15 | End: 2018-10-20

## 2018-10-15 RX ORDER — AZITHROMYCIN 250 MG/1
250 TABLET, FILM COATED ORAL EVERY 24 HOURS
Qty: 5 TABLET | Refills: 0 | Status: SHIPPED | OUTPATIENT
Start: 2018-10-15 | End: 2018-10-20

## 2018-10-15 RX ORDER — FENOFIBRATE 145 MG/1
145 TABLET, COATED ORAL DAILY
Qty: 30 TABLET | Refills: 0 | Status: SHIPPED | OUTPATIENT
Start: 2018-10-15 | End: 2019-01-07 | Stop reason: SDUPTHER

## 2018-10-15 RX ORDER — BENZTROPINE MESYLATE 0.5 MG/1
0.5 TABLET ORAL 2 TIMES DAILY
Refills: 0 | COMMUNITY
Start: 2018-08-13 | End: 2019-04-11

## 2018-10-15 RX ORDER — ZOLPIDEM TARTRATE 12.5 MG/1
12.5 TABLET, FILM COATED, EXTENDED RELEASE ORAL EVERY EVENING
Refills: 2 | COMMUNITY
Start: 2018-08-13 | End: 2020-02-26 | Stop reason: SDUPTHER

## 2018-10-15 RX ORDER — LEVOTHYROXINE SODIUM 0.15 MG/1
150 TABLET ORAL DAILY
Qty: 90 TABLET | Refills: 0 | Status: SHIPPED | OUTPATIENT
Start: 2018-10-15 | End: 2018-12-26 | Stop reason: SDUPTHER

## 2018-10-15 NOTE — TELEPHONE ENCOUNTER
Fax from pharmacy requests script clarification  There are 2 different directions  I don't want to assume which directions are correct but if you want to free text directions, always make sure all of the radio buttons on the specify dose, route, frequency tab are unclicked or it will send both directions

## 2018-10-15 NOTE — ASSESSMENT & PLAN NOTE
Will receive flu vaccine today  Patient is history of multiple episodes of bronchitis that have resolved with azithromycin and states that she would like a Z-Martínez sent to the pharmacy in case she gets sick again

## 2018-10-15 NOTE — PROGRESS NOTES
Assessment/Plan:    Hyperlipidemia  Will repeat lipid profile if applicable  Patient is on appropriate anti lipid medication  Discussed and emphasized the importance of diet and exercise  Patient acknowledges that they understood what was discussed  Type 2 diabetes mellitus without complication, without long-term current use of insulin (HCC)  Lab Results   Component Value Date    HGBA1C 8 5 (H) 09/13/2018       No results for input(s): POCGLU in the last 72 hours  Blood Sugar Average: Last 72 hrs:  - continue metformin and will add Januvia 50 mg daily  - Will repeat [id number] if applicable  - Will continue with current medications  - Patient denies any episodes of hypoglycemia  - Patient educated on the importance of proper dieting and exercise       Encounter for immunization  Will receive flu vaccine today  Patient is history of multiple episodes of bronchitis that have resolved with azithromycin and states that she would like a Z-Martínez sent to the pharmacy in case she gets sick again  Essential hypertension  But patient admits to not taking her spironolactone at pillow at night  If for starting any other medication or blood pressure advised patient to take her spironolactone at night and return to clinic a in 1 week for blood pressure check with nursing  Diagnoses and all orders for this visit:    Type 2 diabetes mellitus without complication, without long-term current use of insulin (HCC)  -     sitaGLIPtin (JANUVIA) 50 mg tablet; Take 1 tablet (50 mg total) by mouth daily  -     HEMOGLOBIN A1C W/ EAG ESTIMATION; Future  -     metFORMIN (GLUCOPHAGE) 500 mg tablet; Take 1 tablet (500 mg total) by mouth 2 (two) times a day with meals    Acquired hypothyroidism    Hyperlipidemia, unspecified hyperlipidemia type  -     Cancel: Lipid panel; Future    Encounter for immunization  -     azithromycin (ZITHROMAX) 250 mg tablet;  Take 1 tablet (250 mg total) by mouth every 24 hours for 5 days Take 2 tablets on day 1 then 1 tablet for 4 days  -     influenza vaccine, 9480-6997, quadrivalent, recombinant, PF, 0 5 mL, for patients 18-49 yr with comorbidities (FLUBLOK)    Mixed hyperlipidemia  -     fenofibrate (TRICOR) 145 mg tablet; Take 1 tablet (145 mg total) by mouth daily    Other specified hypothyroidism  -     levothyroxine 150 mcg tablet; Take 1 tablet (150 mcg total) by mouth daily    Essential hypertension  -     lisinopril (ZESTRIL) 5 mg tablet; Take 1 tablet (5 mg total) by mouth daily    Other orders  -     zolpidem (AMBIEN CR) 12 5 MG CR tablet; Take 12 5 mg by mouth every evening  -     benztropine (COGENTIN) 0 5 mg tablet; Take 0 5 mg by mouth 2 (two) times a day          Subjective:      Patient ID: Phillip Melissa is a 52 y o  female  This is a pleasant 55-year-old transgender female with known past medical history of hypertension, mixed hyperlipidemia, hypothyroidism, type 2 diabetes mellitus and tobacco abuse who presents to clinic for follow-up visit  Patient reports she has been doing well and has recovered from her bronchitis and her viral gastroenteritis  She does state that she is very tired and is under lot of stress as she is working 5 days a week and currently finishing up her master's degree in business administration with a minor and human resources  However, patient reports that she has been compliant with all of her medications except for spironolactone as she is only taking it once daily instead of twice daily as prescribed  Other than that, patient denies any appetite changes or chest pain and reports that she is following up with her OBGYN as scheduled  The following portions of the patient's history were reviewed and updated as appropriate:   She  has a past medical history of Seizure disorder ([facility] [address] )    She   Patient Active Problem List    Diagnosis Date Noted    Encounter for immunization 10/15/2018    Essential hypertension 10/15/2018    Tobacco abuse 09/26/2018    Hyperlipidemia 08/10/2018    Type 2 diabetes mellitus without complication, without long-term current use of insulin ([facility] [address] ) 08/10/2018    Hypothyroidism 10/15/2014    Adolescent or adult gender identity disorder 09/18/2013     She  has a past surgical history that includes Tonsillectomy  Her family history includes Cancer in her family; Depression in her family; Diabetes in her family; Hypertension in her family; Mental illness in her family; Stroke in her family  She  reports that she has been smoking Cigarettes  She uses smokeless tobacco  She reports that she drinks alcohol  She reports that she does not use drugs  Current Outpatient Prescriptions   Medication Sig Dispense Refill    albuterol (VENTOLIN HFA) 90 mcg/act inhaler inhale 2 puff by inhalation route  every 4 - 6 hours as needed      ARIPiprazole (ABILIFY) 10 mg tablet Take 10 mg by mouth daily      ascorbic acid (VITAMIN C) 500 mg tablet Take 1,000 mg by mouth daily      Aspirin (ASPIR-81 PO) Take 81 mg by mouth      aspirin (ECOTRIN LOW STRENGTH) 81 mg EC tablet Take 81 mg by mouth daily      azithromycin (ZITHROMAX) 250 mg tablet Take 1 tablet (250 mg total) by mouth every 24 hours for 5 days Take 2 tablets on day 1 then 1 tablet for 4 days   5 tablet 0    benztropine (COGENTIN) 0 5 mg tablet Take 0 5 mg by mouth 2 (two) times a day  0    cholecalciferol (VITAMIN D3) 1,000 units tablet Take 1,000 Units by mouth daily      COLLAGEN PO Take by mouth daily at bedtime      conjugated estrogens (PREMARIN) vaginal cream Insert 1 g into the vagina 2 (two) times a day for 10 days 42 5 g 0    cyanocobalamin 500 MCG tablet Take 500 mcg by mouth daily      estradiol (ESTRACE VAGINAL) 0 1 mg/g vaginal cream Insert into the vagina      estradiol valerate (DELESTROGEN) 20 MG/ML injection Inject into a muscle      estradiol valerate (DELESTROGEN) 40 MG/ML injection Inject 1 mL (40 mg total) into a muscle every 14 (fourteen) days 5 mL 3    fenofibrate (TRICOR) 145 mg tablet Take 1 tablet (145 mg total) by mouth daily 30 tablet 0    levothyroxine 150 mcg tablet Take 1 tablet (150 mcg total) by mouth daily 90 tablet 0    lisinopril (ZESTRIL) 5 mg tablet Take 1 tablet (5 mg total) by mouth daily 30 tablet 1    metFORMIN (GLUCOPHAGE) 500 mg tablet Take 1 tablet (500 mg total) by mouth 2 (two) times a day with meals 60 tablet 0    multivitamin (THERAGRAN) TABS Take 1 tablet by mouth daily      naproxen (NAPROSYN) 500 mg tablet Take 0 5 tablets (250 mg total) by mouth 2 (two) times a day with meals 14 tablet 0    PREMARIN 1 25 MG tablet TAKE 2 TABLETS BY MOUTH TWICE DAILY 360 tablet 3    sitaGLIPtin (JANUVIA) 50 mg tablet Take 1 tablet (50 mg total) by mouth daily 30 tablet 3    spironolactone (ALDACTONE) 100 mg tablet Take 1 tablet by mouth 2 (two) times a day      zolpidem (AMBIEN CR) 12 5 MG CR tablet Take 12 5 mg by mouth every evening  2     No current facility-administered medications for this visit       Review of Systems   Constitutional: Negative for appetite change, chills and fatigue  HENT: Negative for congestion, ear pain, hearing loss, sore throat and trouble swallowing  Respiratory: Negative for cough, chest tightness and shortness of breath  Cardiovascular: Negative for chest pain, palpitations and leg swelling  Gastrointestinal: Negative for abdominal pain, anal bleeding, nausea and vomiting  Endocrine: Negative for cold intolerance, heat intolerance, polydipsia, polyphagia and polyuria  Genitourinary: Negative for difficulty urinating, pelvic pain, vaginal bleeding and vaginal discharge  Musculoskeletal: Negative for back pain, joint swelling and neck stiffness  Neurological: Negative for dizziness, syncope, numbness and headaches           Objective:      /84 (BP Location: Right arm, Patient Position: Sitting, Cuff Size: Standard)   Temp (!) 97 2 °F (36 2 °C)   Resp 18   Wt 102 kg (224 lb) BMI 32 14 kg/m²          Physical Exam   Constitutional: She is oriented to person, place, and time  She appears well-developed and well-nourished  No distress  HENT:   Head: Normocephalic and atraumatic  Eyes: Pupils are equal, round, and reactive to light  Neck: Normal range of motion  Neck supple  Cardiovascular: Normal rate, regular rhythm and normal heart sounds  Exam reveals no gallop and no friction rub  No murmur heard  Pulmonary/Chest: Effort normal and breath sounds normal  No respiratory distress  Abdominal: Soft  Bowel sounds are normal  She exhibits no distension  Musculoskeletal: Normal range of motion  Neurological: She is alert and oriented to person, place, and time  Skin: Skin is warm  Vitals reviewed

## 2018-10-15 NOTE — ASSESSMENT & PLAN NOTE
Will repeat lipid profile if applicable  Patient is on appropriate anti lipid medication  Discussed and emphasized the importance of diet and exercise  Patient acknowledges that they understood what was discussed

## 2018-10-15 NOTE — ASSESSMENT & PLAN NOTE
Lab Results   Component Value Date    HGBA1C 8 5 (H) 09/13/2018       No results for input(s): POCGLU in the last 72 hours  Blood Sugar Average: Last 72 hrs:  - continue metformin and will add Januvia 50 mg daily  - Will repeat RFR0T if applicable    - Will continue with current medications  - Patient denies any episodes of hypoglycemia  - Patient educated on the importance of proper dieting and exercise

## 2018-10-15 NOTE — ASSESSMENT & PLAN NOTE
But patient admits to not taking her spironolactone at pillow at night  If for starting any other medication or blood pressure advised patient to take her spironolactone at night and return to clinic a in 1 week for blood pressure check with nursing

## 2018-10-23 ENCOUNTER — TELEPHONE (OUTPATIENT)
Dept: FAMILY MEDICINE CLINIC | Facility: CLINIC | Age: 48
End: 2018-10-23

## 2018-10-23 ENCOUNTER — CLINICAL SUPPORT (OUTPATIENT)
Dept: FAMILY MEDICINE CLINIC | Facility: CLINIC | Age: 48
End: 2018-10-23
Payer: COMMERCIAL

## 2018-10-23 VITALS — DIASTOLIC BLOOD PRESSURE: 82 MMHG | SYSTOLIC BLOOD PRESSURE: 146 MMHG

## 2018-10-23 DIAGNOSIS — I10 ESSENTIAL HYPERTENSION: Primary | ICD-10-CM

## 2018-10-23 DIAGNOSIS — E11.9 TYPE 2 DIABETES MELLITUS WITHOUT COMPLICATION, WITHOUT LONG-TERM CURRENT USE OF INSULIN (HCC): ICD-10-CM

## 2018-10-23 DIAGNOSIS — E11.9 TYPE 2 DIABETES MELLITUS WITHOUT COMPLICATION, WITHOUT LONG-TERM CURRENT USE OF INSULIN (HCC): Primary | ICD-10-CM

## 2018-10-23 DIAGNOSIS — I10 HYPERTENSION, UNSPECIFIED TYPE: ICD-10-CM

## 2018-10-23 LAB — SL AMB POCT GLUCOSE BLD: 256

## 2018-10-23 PROCEDURE — 82948 REAGENT STRIP/BLOOD GLUCOSE: CPT

## 2018-10-23 RX ORDER — LANCETS 28 GAUGE
EACH MISCELLANEOUS
Qty: 100 EACH | Refills: 10 | Status: SHIPPED | OUTPATIENT
Start: 2018-10-23 | End: 2019-12-12

## 2018-10-23 RX ORDER — BLOOD SUGAR DIAGNOSTIC
STRIP MISCELLANEOUS AS NEEDED
Qty: 100 EACH | Refills: 10 | Status: SHIPPED | OUTPATIENT
Start: 2018-10-23 | End: 2019-12-12

## 2018-10-23 RX ORDER — BLOOD-GLUCOSE METER
1 KIT MISCELLANEOUS AS NEEDED
Qty: 1 EACH | Refills: 0 | Status: SHIPPED | OUTPATIENT
Start: 2018-10-23 | End: 2018-10-30

## 2018-10-23 RX ORDER — BLOOD PRESSURE TEST KIT
KIT MISCELLANEOUS DAILY
Qty: 1 EACH | Refills: 0 | Status: SHIPPED | OUTPATIENT
Start: 2018-10-23 | End: 2019-11-14

## 2018-10-23 NOTE — TELEPHONE ENCOUNTER
Patient came in as a walk in asked for some DM 2 christine Ramos Rn asked if could take some time to speak with the pt  I was able to speak with her  Patient self report that she suffers from high level and that often she feels like she is going to pass out dizzy and at times feels like she can't hold her urin and has to rush to the bathroom  I asked about her diet and what does she eats  Patient stated that she doesn't snack on high sugars or carbs that she doesn't drink sodas or juices  She eats whole grain bread  She eats 3 times a day in small portions  I let the patient know that although she was on the right track she wasn't getting enough in her system she needs to add 2 more small meals and that her portions to small for her yudy and weight she needs to add a bit more to her plate  Patient also reported to taking diet medication of the adapax  She also reports of begign stressed with all the work and school schedule that she has  I made several suggestion for one for her to raise her intake of food in the way of eating more proteins, fiber, eat every two hours like a  since she is much on the run  To add at least a brisk 10 min walk per day and to find the time to rest or meditate each day to try to lower her tenttion  To also add peanut butter powder to her smoothies since she did say that they were a part of her diet as well  This way she can get a good soarse of protein  To also try to slow down her smoking that may also help in the long run  Patient also said that she still doesn't have a glucometer that she is still waiting for one and for a BP machine  I let her know that I would let her doctor know

## 2018-10-30 DIAGNOSIS — E11.9 TYPE 2 DIABETES MELLITUS WITHOUT COMPLICATION, WITHOUT LONG-TERM CURRENT USE OF INSULIN (HCC): Primary | ICD-10-CM

## 2018-10-30 NOTE — TELEPHONE ENCOUNTER
Patient states she needs for you to resend One Touch glucose monitor kit over NOT Free style    Please and Thank pt came into the front office requesting this change due to her insurance company

## 2018-11-08 DIAGNOSIS — E11.9 TYPE 2 DIABETES MELLITUS WITHOUT COMPLICATION, WITHOUT LONG-TERM CURRENT USE OF INSULIN (HCC): Primary | ICD-10-CM

## 2018-12-07 ENCOUNTER — OFFICE VISIT (OUTPATIENT)
Dept: OBGYN CLINIC | Facility: HOSPITAL | Age: 48
End: 2018-12-07
Payer: COMMERCIAL

## 2018-12-07 VITALS
DIASTOLIC BLOOD PRESSURE: 98 MMHG | SYSTOLIC BLOOD PRESSURE: 167 MMHG | WEIGHT: 222.6 LBS | HEIGHT: 70 IN | HEART RATE: 109 BPM | BODY MASS INDEX: 31.87 KG/M2

## 2018-12-07 DIAGNOSIS — Z78.9 TRANSGENDER: ICD-10-CM

## 2018-12-07 DIAGNOSIS — E66.9 OBESITY (BMI 30.0-34.9): Primary | ICD-10-CM

## 2018-12-07 DIAGNOSIS — Z11.3 SCREEN FOR SEXUALLY TRANSMITTED DISEASES: ICD-10-CM

## 2018-12-07 PROCEDURE — 99214 OFFICE O/P EST MOD 30 MIN: CPT | Performed by: OBSTETRICS & GYNECOLOGY

## 2018-12-07 RX ORDER — ESTRADIOL VALERATE 40 MG/ML
40 INJECTION INTRAMUSCULAR
Qty: 5 ML | Refills: 3 | Status: SHIPPED | OUTPATIENT
Start: 2018-12-07 | End: 2019-03-01

## 2018-12-07 RX ORDER — PHENTERMINE HYDROCHLORIDE 37.5 MG/1
37.5 CAPSULE ORAL EVERY MORNING
Qty: 30 CAPSULE | Refills: 0 | Status: SHIPPED | OUTPATIENT
Start: 2018-12-07 | End: 2018-12-07

## 2018-12-07 RX ORDER — PHENTERMINE HYDROCHLORIDE 37.5 MG/1
37.5 CAPSULE ORAL EVERY MORNING
Qty: 30 CAPSULE | Refills: 0 | Status: SHIPPED | OUTPATIENT
Start: 2018-12-07 | End: 2019-11-14

## 2018-12-07 NOTE — PROGRESS NOTES
Assessment:   22-year-old transgender female well known to me stable condition    Plan    1  Diagnosis mild hypertension, diabetes, lipid abnormalities being followed up by primary care physician    2  Will continue hormonal therapy  Again had long discussion with her about the importance of lowering her estradiol dose, however the patient refuses at this time  She appears to be aware of the risks which were goe over in detail with her  3    Patient requesting medication to help with weight loss  In the past patient has been on Adipex with significant success  Once again I stated my reservations about using this medication due to the side effect profile  She again is aware of the risks to this medication and stated that I would only prescribe a months worth at this time  4   Return for pelvic exam in the next 3 months  CC:   Patient here for routine follow-up denies any current problems related to her transgender status  She has seen a primary care physician who is doing an excellent job caring for her abnormalities as noted in her assessment

## 2018-12-22 DIAGNOSIS — E03.8 OTHER SPECIFIED HYPOTHYROIDISM: ICD-10-CM

## 2018-12-24 RX ORDER — LEVOTHYROXINE SODIUM 0.15 MG/1
TABLET ORAL
Qty: 90 TABLET | Refills: 0 | OUTPATIENT
Start: 2018-12-24

## 2018-12-26 RX ORDER — LEVOTHYROXINE SODIUM 0.15 MG/1
150 TABLET ORAL DAILY
Qty: 90 TABLET | Refills: 1 | Status: SHIPPED | OUTPATIENT
Start: 2018-12-26 | End: 2019-07-02 | Stop reason: SDUPTHER

## 2019-01-07 DIAGNOSIS — E78.2 MIXED HYPERLIPIDEMIA: ICD-10-CM

## 2019-01-07 RX ORDER — FENOFIBRATE 145 MG/1
145 TABLET, COATED ORAL DAILY
Qty: 30 TABLET | Refills: 3 | Status: SHIPPED | OUTPATIENT
Start: 2019-01-07 | End: 2019-01-09 | Stop reason: SDUPTHER

## 2019-01-09 DIAGNOSIS — E78.2 MIXED HYPERLIPIDEMIA: ICD-10-CM

## 2019-01-09 RX ORDER — FENOFIBRATE 145 MG/1
145 TABLET, COATED ORAL DAILY
Qty: 30 TABLET | Refills: 3 | Status: SHIPPED | OUTPATIENT
Start: 2019-01-09 | End: 2019-04-11

## 2019-01-16 ENCOUNTER — OFFICE VISIT (OUTPATIENT)
Dept: FAMILY MEDICINE CLINIC | Facility: CLINIC | Age: 49
End: 2019-01-16

## 2019-01-16 VITALS
TEMPERATURE: 97.2 F | BODY MASS INDEX: 31.21 KG/M2 | SYSTOLIC BLOOD PRESSURE: 128 MMHG | WEIGHT: 218 LBS | HEIGHT: 70 IN | RESPIRATION RATE: 20 BRPM | DIASTOLIC BLOOD PRESSURE: 84 MMHG

## 2019-01-16 DIAGNOSIS — J21.9 ACUTE BRONCHIOLITIS DUE TO UNSPECIFIED ORGANISM: Primary | ICD-10-CM

## 2019-01-16 PROCEDURE — 99213 OFFICE O/P EST LOW 20 MIN: CPT | Performed by: FAMILY MEDICINE

## 2019-01-16 RX ORDER — AZITHROMYCIN 250 MG/1
TABLET, FILM COATED ORAL
Qty: 6 TABLET | Refills: 0 | Status: SHIPPED | OUTPATIENT
Start: 2019-01-16 | End: 2019-01-20

## 2019-01-16 RX ORDER — GUAIFENESIN 600 MG
1200 TABLET, EXTENDED RELEASE 12 HR ORAL EVERY 12 HOURS SCHEDULED
Qty: 30 TABLET | Refills: 0 | Status: SHIPPED | OUTPATIENT
Start: 2019-01-16 | End: 2019-04-11

## 2019-01-16 NOTE — PROGRESS NOTES
Assessment/Plan:    Acute bronchiolitis  Will start on z-pac, and Mucinex   Drink plenty of fluid  Rest as needed  Tylenol as needed       Diagnoses and all orders for this visit:    Acute bronchiolitis due to unspecified organism  -     azithromycin (ZITHROMAX) 250 mg tablet; Take 2 tablets today then 1 tablet daily x 4 days  -     guaiFENesin (MUCINEX) 600 mg 12 hr tablet; Take 2 tablets (1,200 mg total) by mouth every 12 (twelve) hours          Subjective:      Patient ID: Alba Lynch is a 50 y o  female  This is a very pleasant 24-year-old female presents to the clinic for a sick visit  Patient complains of cough congestion fevers sore throat for the past few weeks on and off patient states has been progressively getting worse she has multiple sick contacts at work which were recently diagnosed with acute bronchitis  Patient states that she feels malaise body aches and fatigue she has tried multiple over-the-counter medications and she has been continuously keeping hydrated  Will start patient on azithromycin, as well as give Mucinex for the symptoms continue Patanol as needed and maintain p o  Hydration  Patient has no further complaints at this time  Spoke to patient about Antibiotic and told her this is most likely viral and she will have a pocket prescription  Patient is addiment that she wants to take the antibiotic  The following portions of the patient's history were reviewed and updated as appropriate: allergies, current medications, past family history, past medical history, past social history, past surgical history and problem list     Review of Systems   Constitutional: Positive for fever  Negative for chills  HENT: Positive for sore throat  Negative for congestion  Eyes: Negative for visual disturbance  Respiratory: Positive for cough and shortness of breath  Negative for wheezing  Cardiovascular: Negative for chest pain     Gastrointestinal: Positive for nausea and vomiting  Negative for abdominal pain and blood in stool  Endocrine: Negative for cold intolerance and heat intolerance  Genitourinary: Negative for dysuria and hematuria  Musculoskeletal: Negative for gait problem  Skin: Negative for rash  Allergic/Immunologic: Negative for environmental allergies  Neurological: Negative for syncope  Hematological: Does not bruise/bleed easily  Psychiatric/Behavioral: Negative for behavioral problems  Objective:      /84 (BP Location: Right arm, Patient Position: Sitting, Cuff Size: Large)   Temp (!) 97 2 °F (36 2 °C) (Temporal)   Resp 20   Ht 5' 10" (1 778 m)   Wt 98 9 kg (218 lb)   BMI 31 28 kg/m²          Physical Exam   Constitutional: She is oriented to person, place, and time  She appears well-developed and well-nourished  No distress  HENT:   Head: Normocephalic and atraumatic  Right Ear: External ear normal    Left Ear: External ear normal    Nose: Nose normal    Mouth/Throat: Oropharynx is clear and moist    Nasal congestion    Eyes: Pupils are equal, round, and reactive to light  Conjunctivae and EOM are normal  Right eye exhibits no discharge  Left eye exhibits no discharge  Neck: Normal range of motion  Neck supple  No JVD present  No tracheal deviation present  No thyromegaly present  Cardiovascular: Normal rate, regular rhythm, normal heart sounds and intact distal pulses  Exam reveals no gallop and no friction rub  No murmur heard  Pulmonary/Chest: Effort normal and breath sounds normal  No respiratory distress  She has no wheezes  She exhibits no tenderness  Abdominal: Soft  Bowel sounds are normal  She exhibits no distension  There is no tenderness  There is no rebound  Musculoskeletal: Normal range of motion  She exhibits no edema or tenderness  Neurological: She is alert and oriented to person, place, and time  She has normal reflexes  Coordination normal    Skin: Skin is warm and dry  No rash noted   She is not diaphoretic  No erythema  Psychiatric: She has a normal mood and affect   Her behavior is normal  Thought content normal

## 2019-01-24 ENCOUNTER — HOSPITAL ENCOUNTER (OUTPATIENT)
Dept: RADIOLOGY | Facility: HOSPITAL | Age: 49
Discharge: HOME | End: 2019-01-24
Payer: COMMERCIAL

## 2019-01-24 ENCOUNTER — TELEPHONE (OUTPATIENT)
Dept: FAMILY MEDICINE CLINIC | Facility: CLINIC | Age: 49
End: 2019-01-24

## 2019-01-24 ENCOUNTER — APPOINTMENT (OUTPATIENT)
Dept: LAB | Facility: HOSPITAL | Age: 49
End: 2019-01-24
Payer: COMMERCIAL

## 2019-01-24 ENCOUNTER — OFFICE VISIT (OUTPATIENT)
Dept: FAMILY MEDICINE CLINIC | Facility: CLINIC | Age: 49
End: 2019-01-24

## 2019-01-24 VITALS
TEMPERATURE: 96.9 F | HEIGHT: 70 IN | WEIGHT: 218 LBS | BODY MASS INDEX: 31.21 KG/M2 | RESPIRATION RATE: 18 BRPM | SYSTOLIC BLOOD PRESSURE: 150 MMHG | DIASTOLIC BLOOD PRESSURE: 92 MMHG

## 2019-01-24 DIAGNOSIS — E11.9 TYPE 2 DIABETES MELLITUS WITHOUT COMPLICATION, WITHOUT LONG-TERM CURRENT USE OF INSULIN (HCC): ICD-10-CM

## 2019-01-24 DIAGNOSIS — I10 ESSENTIAL HYPERTENSION: ICD-10-CM

## 2019-01-24 DIAGNOSIS — E78.1 HYPERTRIGLYCERIDEMIA: ICD-10-CM

## 2019-01-24 DIAGNOSIS — I10 HTN, GOAL BELOW 140/90: ICD-10-CM

## 2019-01-24 DIAGNOSIS — E11.9 TYPE 2 DIABETES MELLITUS WITHOUT COMPLICATION, WITHOUT LONG-TERM CURRENT USE OF INSULIN (HCC): Primary | ICD-10-CM

## 2019-01-24 DIAGNOSIS — J21.9 ACUTE BRONCHIOLITIS DUE TO UNSPECIFIED ORGANISM: ICD-10-CM

## 2019-01-24 DIAGNOSIS — J42 CHRONIC BRONCHITIS, UNSPECIFIED CHRONIC BRONCHITIS TYPE (HCC): ICD-10-CM

## 2019-01-24 DIAGNOSIS — E03.9 ACQUIRED HYPOTHYROIDISM: ICD-10-CM

## 2019-01-24 DIAGNOSIS — Z72.0 TOBACCO ABUSE: ICD-10-CM

## 2019-01-24 PROBLEM — Z23 ENCOUNTER FOR IMMUNIZATION: Status: RESOLVED | Noted: 2018-10-15 | Resolved: 2019-01-24

## 2019-01-24 LAB
CREAT UR-MCNC: 115 MG/DL
EST. AVERAGE GLUCOSE BLD GHB EST-MCNC: 263 MG/DL
HBA1C MFR BLD: 10.8 % (ref 4.2–6.3)
MICROALBUMIN UR-MCNC: 200 MG/L (ref 0–20)
MICROALBUMIN/CREAT 24H UR: 174 MG/G CREATININE (ref 0–30)
TSH SERPL DL<=0.05 MIU/L-ACNC: 5.01 UIU/ML (ref 0.47–4.68)

## 2019-01-24 PROCEDURE — 83036 HEMOGLOBIN GLYCOSYLATED A1C: CPT

## 2019-01-24 PROCEDURE — 82570 ASSAY OF URINE CREATININE: CPT | Performed by: FAMILY MEDICINE

## 2019-01-24 PROCEDURE — 36415 COLL VENOUS BLD VENIPUNCTURE: CPT

## 2019-01-24 PROCEDURE — 71046 X-RAY EXAM CHEST 2 VIEWS: CPT

## 2019-01-24 PROCEDURE — 84443 ASSAY THYROID STIM HORMONE: CPT

## 2019-01-24 PROCEDURE — 3060F POS MICROALBUMINURIA REV: CPT | Performed by: FAMILY MEDICINE

## 2019-01-24 PROCEDURE — 99213 OFFICE O/P EST LOW 20 MIN: CPT | Performed by: FAMILY MEDICINE

## 2019-01-24 PROCEDURE — 82043 UR ALBUMIN QUANTITATIVE: CPT | Performed by: FAMILY MEDICINE

## 2019-01-24 RX ORDER — PHENTERMINE HYDROCHLORIDE 37.5 MG/1
TABLET ORAL
Refills: 0 | COMMUNITY
Start: 2018-12-09 | End: 2019-01-24

## 2019-01-24 RX ORDER — BLOOD-GLUCOSE METER
EACH MISCELLANEOUS
Refills: 0 | COMMUNITY
Start: 2018-10-30 | End: 2020-02-26 | Stop reason: SDUPTHER

## 2019-01-24 RX ORDER — ALBUTEROL SULFATE 90 UG/1
2 AEROSOL, METERED RESPIRATORY (INHALATION) EVERY 4 HOURS PRN
Qty: 1 INHALER | Refills: 1 | Status: SHIPPED | OUTPATIENT
Start: 2019-01-24 | End: 2019-12-12

## 2019-01-24 NOTE — ASSESSMENT & PLAN NOTE
- patient has had been having symptoms for about 1 month that has been refractory to antibiotic treatment with azithromycin  - will obtain chest x-ray prescribe patient an albuterol pump to help with cough and shortness of breath  - if chest x-ray is normal will consider PFTs and a short course of oral prednisone  - I definitely think tobacco smoke is a culprit and tobacco smoking cessation was discussed thoroughly with patient  - patient also reported she would like to have HIV test and will go to the [facility] to have it done and informed me of resolves

## 2019-01-24 NOTE — TELEPHONE ENCOUNTER
I afford patient an appt with Zenaida Lopez for a sameday tomorrow 01/24/2019 @ 340pm can you please put her in I was unable to  She was able to come in today to see PCP she needed a afternoon appt she states she is still cough,congested,fever  She was here last week  And she is still no feeling better

## 2019-01-24 NOTE — ASSESSMENT & PLAN NOTE
Lab Results   Component Value Date    HGBA1C 8 5 (H) 09/13/2018       No results for input(s): POCGLU in the last 72 hours  Blood Sugar Average: Last 72 hrs:  - Will repeat QQZ2G if applicable    - Will continue with current medications  - Patient denies any episodes of hypoglycemia  - Patient educated on the importance of proper dieting and exercise   - will obtain microalbumin/creatinine

## 2019-01-24 NOTE — PROGRESS NOTES
Assessment/Plan:    Type 2 diabetes mellitus without complication, without long-term current use of insulin (HCC)  Lab Results   Component Value Date    HGBA1C 8 5 (H) 09/13/2018       No results for input(s): POCGLU in the last 72 hours  Blood Sugar Average: Last 72 hrs:  - Will repeat ACO7M if applicable  - Will continue with current medications  - Patient denies any episodes of hypoglycemia  - Patient educated on the importance of proper dieting and exercise   - will obtain microalbumin/creatinine    Hypothyroidism  - will obtain TSH continue levothyroxine    Hypertriglyceridemia  Will repeat lipid profile if applicable  Patient is on appropriate anti lipid medication  Discussed and emphasized the importance of diet and exercise  Patient acknowledges that they understood what was discussed  Tobacco abuse  - Counseled patient to quit and discussed that her cough is chronic most likely secondary to smoking cigarettes  - Explained harmful affects of smoking to patient  - Encouraged to quit    HTN, goal below 140/90  - considering patient has been sick recently this may be contributing factor to her uncontrolled hypertension   - therefore, before adding a new medication to her regimen patient was advised to return to the clinic in 1 week for blood pressure check with nursing    - blood pressure continues to be not at goal therefore add lisinopril to patient's medication regimen  Chronic bronchitis ([facility] )  - patient has had been having symptoms for about 1 month that has been refractory to antibiotic treatment with azithromycin  - will obtain chest x-ray prescribe patient an albuterol pump to help with cough and shortness of breath  - if chest x-ray is normal will consider PFTs and a short course of oral prednisone  - I definitely think tobacco smoke is a culprit and tobacco smoking cessation was discussed thoroughly with patient    - patient also reported she would like to have HIV test and will go to the [facility] to have it done and informed me of resolves  Diagnoses and all orders for this visit:    Type 2 diabetes mellitus without complication, without long-term current use of insulin (HCC)  -     Cancel: Microalbumin / creatinine urine ratio  -     Microalbumin / creatinine urine ratio    Acquired hypothyroidism  -     TSH, 3rd generation; Future    Tobacco abuse    Hypertriglyceridemia    HTN, goal below 140/90    Acute bronchiolitis due to unspecified organism  -     XR chest pa & lateral; Future  -     albuterol (VENTOLIN HFA) 90 mcg/act inhaler; Inhale 2 puffs every 4 (four) hours as needed for wheezing    Essential hypertension    Chronic bronchitis, unspecified chronic bronchitis type (HCC)    Other orders  -     Discontinue: phentermine (ADIPEX-P) 37 5 MG tablet; TAKE 1 TABLET (37 5 MG TOTAL) BY MOUTH EVERY MORNING  -     Blood Glucose Monitoring Suppl (ONETOUCH VERIO FLEX SYSTEM) w/Device KIT; USE TO TEST BLOOD SUGARS ONCE DAILY          Subjective:      Patient ID: Debbie Torres is a 50 y o  female  This is a pleasant 57-year-old trans gender female with known past medical history of type 2 diabetes mellitus, hypertriglyceridemia, tobacco use, hypertension, hypothyroidism and multiple office visits for cough  Patient reports that her cough has improved minimally after the use of azithromycin for 5 days  She reports she has been having this cough and cold-like symptoms since early December  She states that multiple people at her job have been diagnosed with pneumonia and she is fearful that she may be having pneumonia at this moment  Patient states that she is compliant with her other medications and with her follow-up visits to [facility] [place]  See below for more details about cough  Cough   This is a chronic problem  The current episode started 1 to 4 weeks ago  The problem has been unchanged  The problem occurs every few minutes  The cough is non-productive   Associated symptoms include nasal congestion, sweats, weight loss and wheezing  Pertinent negatives include no chest pain, chills, ear pain, fever, headaches, heartburn, hemoptysis, myalgias, postnasal drip, sore throat or shortness of breath  Nothing aggravates the symptoms  She has tried body position changes (Oral antibiotic) for the symptoms  The treatment provided mild relief  Her past medical history is significant for bronchitis  There is no history of asthma, bronchiectasis, COPD, emphysema, environmental allergies or pneumonia  The following portions of the patient's history were reviewed and updated as appropriate:   She  has a past medical history of Seizure disorder ([facility] )  She   Patient Active Problem List    Diagnosis Date Noted    Chronic bronchitis ([place] [address] ) 01/16/2019    HTN, goal below 140/90 10/15/2018    Tobacco abuse 09/26/2018    Hypertriglyceridemia 08/10/2018    Type 2 diabetes mellitus without complication, without long-term current use of insulin ([place] [address] ) 08/10/2018    Hypothyroidism 10/15/2014    Adolescent or adult gender identity disorder 09/18/2013     She  has a past surgical history that includes Tonsillectomy  Her family history includes Cancer in her family; Depression in her family; Diabetes in her family; Hypertension in her family; Mental illness in her family; Stroke in her family  She  reports that she has been smoking Cigarettes  She uses smokeless tobacco  She reports that she drinks alcohol  She reports that she does not use drugs    Current Outpatient Prescriptions   Medication Sig Dispense Refill    albuterol (VENTOLIN HFA) 90 mcg/act inhaler Inhale 2 puffs every 4 (four) hours as needed for wheezing 1 Inhaler 1    Alcohol Swabs (ALCOHOL PADS) 70 % PADS by Does not apply route as needed (sick) 100 each 10    ARIPiprazole (ABILIFY) 10 mg tablet Take 10 mg by mouth daily      ascorbic acid (VITAMIN C) 500 mg tablet Take 1,000 mg by mouth daily      Aspirin (ASPIR-81 PO) Take 81 mg by mouth      aspirin (ECOTRIN LOW STRENGTH) 81 mg EC tablet Take 81 mg by mouth daily      benztropine (COGENTIN) 0 5 mg tablet Take 0 5 mg by mouth 2 (two) times a day  0    Blood Glucose Monitoring Suppl (ONETOUCH VERIO FLEX SYSTEM) w/Device KIT USE TO TEST BLOOD SUGARS ONCE DAILY  0    Blood Glucose Monitoring Suppl KIT by Does not apply route daily 30 each 0    Blood Pressure KIT by Does not apply route daily 1 each 0    cholecalciferol (VITAMIN D3) 1,000 units tablet Take 1,000 Units by mouth daily      COLLAGEN PO Take by mouth daily at bedtime      conjugated estrogens (PREMARIN) vaginal cream Insert 1 g into the vagina 2 (two) times a day for 10 days 42 5 g 0    cyanocobalamin 500 MCG tablet Take 500 mcg by mouth daily      estradiol (ESTRACE VAGINAL) 0 1 mg/g vaginal cream Insert into the vagina      estradiol valerate (DELESTROGEN) 20 MG/ML injection Inject into a muscle      estradiol valerate (DELESTROGEN) 40 MG/ML injection Inject 1 mL (40 mg total) into a muscle every 14 (fourteen) days 5 mL 3    fenofibrate (TRICOR) 145 mg tablet Take 1 tablet (145 mg total) by mouth daily 30 tablet 3    glucose blood test strip Please check blood glucose three times daily 100 each 3    guaiFENesin (MUCINEX) 600 mg 12 hr tablet Take 2 tablets (1,200 mg total) by mouth every 12 (twelve) hours 30 tablet 0    Lancets (FREESTYLE) lancets Use as instructed 100 each 10    levothyroxine 150 mcg tablet Take 1 tablet (150 mcg total) by mouth daily 90 tablet 1    metFORMIN (GLUCOPHAGE) 500 mg tablet Take 1 tablet (500 mg total) by mouth 2 (two) times a day with meals 60 tablet 0    multivitamin (THERAGRAN) TABS Take 1 tablet by mouth daily      naproxen (NAPROSYN) 500 mg tablet Take 0 5 tablets (250 mg total) by mouth 2 (two) times a day with meals 14 tablet 0    phentermine (ADIPEX-P) 37 5 MG capsule Take 1 capsule (37 5 mg total) by mouth every morning 30 capsule 0    PREMARIN 1 25 MG tablet TAKE 2 TABLETS BY MOUTH TWICE DAILY 360 tablet 3    sitaGLIPtin (JANUVIA) 50 mg tablet Take 1 tablet (50 mg total) by mouth daily 30 tablet 3    spironolactone (ALDACTONE) 100 mg tablet Take 1 tablet by mouth 2 (two) times a day      zolpidem (AMBIEN CR) 12 5 MG CR tablet Take 12 5 mg by mouth every evening  2     No current facility-administered medications for this visit       Review of Systems   Constitutional: Positive for weight loss  Negative for appetite change, chills, fatigue and fever  HENT: Negative for congestion, ear pain, hearing loss, postnasal drip, sore throat and trouble swallowing  Respiratory: Positive for cough, chest tightness and wheezing  Negative for hemoptysis and shortness of breath  Cardiovascular: Negative for chest pain, palpitations and leg swelling  Gastrointestinal: Negative for abdominal pain, anal bleeding, heartburn, nausea and vomiting  Endocrine: Negative for cold intolerance, heat intolerance, polydipsia, polyphagia and polyuria  Genitourinary: Negative for difficulty urinating, pelvic pain, vaginal bleeding and vaginal discharge  Musculoskeletal: Negative for back pain, joint swelling, myalgias and neck stiffness  Allergic/Immunologic: Negative for environmental allergies  Neurological: Negative for dizziness, syncope, numbness and headaches  Objective:      /92 (BP Location: Right arm, Patient Position: Sitting, Cuff Size: Large)   Temp (!) 96 9 °F (36 1 °C) (Temporal)   Resp 18   Ht 5' 10" (1 778 m)   Wt 98 9 kg (218 lb)   BMI 31 28 kg/m²          Physical Exam   Constitutional: She is oriented to person, place, and time  She appears well-developed and well-nourished  No distress  HENT:   Head: Normocephalic and atraumatic  Eyes: Pupils are equal, round, and reactive to light  Neck: Normal range of motion  Neck supple  Cardiovascular: Normal rate, regular rhythm and normal heart sounds    Exam reveals no gallop and no friction rub  No murmur heard  Pulmonary/Chest: Effort normal and breath sounds normal  No respiratory distress  Abdominal: Soft  Bowel sounds are normal  She exhibits no distension  Musculoskeletal: Normal range of motion  Neurological: She is alert and oriented to person, place, and time  Skin: Skin is warm  Vitals reviewed

## 2019-01-24 NOTE — ASSESSMENT & PLAN NOTE
- Counseled patient to quit and discussed that her cough is chronic most likely secondary to smoking cigarettes  - Explained harmful affects of smoking to patient  - Encouraged to quit

## 2019-01-24 NOTE — ASSESSMENT & PLAN NOTE
- considering patient has been sick recently this may be contributing factor to her uncontrolled hypertension   - therefore, before adding a new medication to her regimen patient was advised to return to the clinic in 1 week for blood pressure check with nursing    - blood pressure continues to be not at goal therefore add lisinopril to patient's medication regimen

## 2019-01-25 ENCOUNTER — TELEPHONE (OUTPATIENT)
Dept: FAMILY MEDICINE CLINIC | Facility: CLINIC | Age: 49
End: 2019-01-25

## 2019-01-25 DIAGNOSIS — E11.9 TYPE 2 DIABETES MELLITUS WITHOUT COMPLICATION, WITHOUT LONG-TERM CURRENT USE OF INSULIN (HCC): Primary | ICD-10-CM

## 2019-01-25 DIAGNOSIS — E03.8 OTHER SPECIFIED HYPOTHYROIDISM: ICD-10-CM

## 2019-01-25 DIAGNOSIS — E78.1 HYPERTRIGLYCERIDEMIA: ICD-10-CM

## 2019-01-25 DIAGNOSIS — I10 HTN, GOAL BELOW 140/90: ICD-10-CM

## 2019-01-25 PROCEDURE — 4010F ACE/ARB THERAPY RXD/TAKEN: CPT | Performed by: FAMILY MEDICINE

## 2019-01-25 RX ORDER — LISINOPRIL 2.5 MG/1
2.5 TABLET ORAL DAILY
Qty: 30 TABLET | Refills: 3 | Status: SHIPPED | OUTPATIENT
Start: 2019-01-25 | End: 2019-02-14

## 2019-01-25 RX ORDER — GLIPIZIDE 5 MG/1
5 TABLET, FILM COATED, EXTENDED RELEASE ORAL DAILY
Qty: 30 TABLET | Refills: 3 | Status: SHIPPED | OUTPATIENT
Start: 2019-01-25 | End: 2019-05-03 | Stop reason: SDUPTHER

## 2019-01-25 NOTE — TELEPHONE ENCOUNTER
Informed patient about XRAY results which was normal    Also, spoke with patient about worsening diabetes and microalbuminuria  Patient refuses to be on insulin therapy  Therefore, will increase januvia to 100mg QD and add glipizide 5mg QD and lisinopril 2 5mg QD  Advised patient to exercise (resistance training) and eat a healthier diet with many vegetables and fiber rich foods  Will consider endocrinology referral on next visit if HBA1C remains the same or worsens  Patient agreed to plan

## 2019-02-02 DIAGNOSIS — Z78.9 MALE-TO-FEMALE TRANSGENDER PERSON: Primary | ICD-10-CM

## 2019-02-05 RX ORDER — SPIRONOLACTONE 100 MG/1
TABLET, FILM COATED ORAL
Qty: 180 TABLET | Refills: 3 | Status: SHIPPED | OUTPATIENT
Start: 2019-02-05 | End: 2020-02-14

## 2019-02-11 DIAGNOSIS — E11.9 TYPE 2 DIABETES MELLITUS WITHOUT COMPLICATION, WITHOUT LONG-TERM CURRENT USE OF INSULIN (HCC): ICD-10-CM

## 2019-02-14 ENCOUNTER — TELEPHONE (OUTPATIENT)
Dept: FAMILY MEDICINE CLINIC | Facility: CLINIC | Age: 49
End: 2019-02-14

## 2019-02-14 ENCOUNTER — OFFICE VISIT (OUTPATIENT)
Dept: FAMILY MEDICINE CLINIC | Facility: CLINIC | Age: 49
End: 2019-02-14

## 2019-02-14 VITALS
DIASTOLIC BLOOD PRESSURE: 70 MMHG | BODY MASS INDEX: 31.12 KG/M2 | WEIGHT: 217.38 LBS | SYSTOLIC BLOOD PRESSURE: 156 MMHG | HEIGHT: 70 IN | TEMPERATURE: 99.2 F | HEART RATE: 100 BPM

## 2019-02-14 DIAGNOSIS — J42 CHRONIC BRONCHITIS, UNSPECIFIED CHRONIC BRONCHITIS TYPE (HCC): Primary | ICD-10-CM

## 2019-02-14 PROCEDURE — 99213 OFFICE O/P EST LOW 20 MIN: CPT | Performed by: FAMILY MEDICINE

## 2019-02-14 RX ORDER — CETIRIZINE HYDROCHLORIDE, PSEUDOEPHEDRINE HYDROCHLORIDE 5; 120 MG/1; MG/1
1 TABLET, FILM COATED, EXTENDED RELEASE ORAL 2 TIMES DAILY
Qty: 30 TABLET | Refills: 3 | Status: SHIPPED | OUTPATIENT
Start: 2019-02-14 | End: 2019-04-11

## 2019-02-14 RX ORDER — MONTELUKAST SODIUM 5 MG/1
5 TABLET, CHEWABLE ORAL
Qty: 30 TABLET | Refills: 3 | Status: SHIPPED | OUTPATIENT
Start: 2019-02-14 | End: 2019-04-11

## 2019-02-14 NOTE — TELEPHONE ENCOUNTER
Jo states she has an appt with Dr Daryle Ferris today at 11:40AM and she has a mandatory meeting at work at 11:45AM  Eugenia Chaudhari wanted to know is it okay with Dr Daryle Ferris she comes in at [facility]?

## 2019-02-14 NOTE — ASSESSMENT & PLAN NOTE
- this point I believe patient has no allergic predominant bronchitis  Therefore, will obtain CBC, IgE level and allergen panel  - I will obtain CT of the chest to rule out any structural issues that may be causing patient's cough  - I will also obtain spirometry as patient is known smoker and this could be new onset COPD  - for now will prescribe patient cetirizine with pseudoephedrine and singular 5 mg HS

## 2019-02-14 NOTE — PROGRESS NOTES
Assessment/Plan:    Chronic bronchitis ([facility] )  - this point I believe patient has no allergic predominant bronchitis  Therefore, will obtain CBC, IgE level and allergen panel  - I will obtain CT of the chest to rule out any structural issues that may be causing patient's cough  - I will also obtain spirometry as patient is known smoker and this could be new onset COPD  - for now will prescribe patient cetirizine with pseudoephedrine and singular 5 mg HS  Diagnoses and all orders for this visit:    Chronic bronchitis, unspecified chronic bronchitis type ([facility] )  -     Spirometry; Future  -     CT chest wo contrast; Future  -     CBC and differential; Future  -     montelukast (SINGULAIR) 5 mg chewable tablet; Chew 1 tablet (5 mg total) daily at bedtime  -     cetirizine-pseudoephedrine (ZyrTEC-D) 5-120 MG per tablet; Take 1 tablet by mouth 2 (two) times a day  -     Allergy Evaluation 1, [place]; Future  -     IgE; Future          Subjective:      Patient ID: Janette Godinez is a 50 y o  adult  This is a pleasant 70-year-old female with no past medical history of type 2 diabetes, hypertriglyceridemia, hypertension, and hypothyroidism who comes to the clinic for cough for the past 3 months that has not improved after 3 doses of antibiotics and over-the-counter allergy medication  See below for rest     Cough   This is a chronic problem  The current episode started more than 1 month ago  The problem has been unchanged  The problem occurs constantly  The cough is non-productive  Associated symptoms include nasal congestion  Pertinent negatives include no chest pain, chills, ear congestion, ear pain, fever, headaches, heartburn, hemoptysis, myalgias, postnasal drip, rash, rhinorrhea, sore throat, shortness of breath, sweats, weight loss or wheezing  The symptoms are aggravated by dust and fumes  Risk factors for lung disease include smoking/tobacco exposure   She has tried a beta-agonist inhaler and OTC cough suppressant for the symptoms  The treatment provided no relief  There is no history of asthma, bronchiectasis, bronchitis, COPD, emphysema, environmental allergies or pneumonia  The following portions of the patient's history were reviewed and updated as appropriate:   She  has a past medical history of Seizure disorder ([facility] )  She   Patient Active Problem List    Diagnosis Date Noted    Chronic bronchitis ([place] [address] ) 01/16/2019    HTN, goal below 140/90 10/15/2018    Tobacco abuse 09/26/2018    Hypertriglyceridemia 08/10/2018    Type 2 diabetes mellitus without complication, without long-term current use of insulin ([place] [address] ) 08/10/2018    Hypothyroidism 10/15/2014    Adolescent or adult gender identity disorder 09/18/2013     She  has a past surgical history that includes Tonsillectomy  Her family history includes Cancer in her family; Depression in her family; Diabetes in her family; Hypertension in her family; Mental illness in her family; Stroke in her family  She  reports that she has been smoking cigarettes  She uses smokeless tobacco  She reports that she drinks alcohol  She reports that she does not use drugs    Current Outpatient Medications   Medication Sig Dispense Refill    albuterol (VENTOLIN HFA) 90 mcg/act inhaler Inhale 2 puffs every 4 (four) hours as needed for wheezing 1 Inhaler 1    Alcohol Swabs (ALCOHOL PADS) 70 % PADS by Does not apply route as needed (sick) 100 each 10    ARIPiprazole (ABILIFY) 10 mg tablet Take 10 mg by mouth daily      ascorbic acid (VITAMIN C) 500 mg tablet Take 1,000 mg by mouth daily      aspirin (ECOTRIN LOW STRENGTH) 81 mg EC tablet Take 81 mg by mouth daily      benztropine (COGENTIN) 0 5 mg tablet Take 0 5 mg by mouth 2 (two) times a day  0    Blood Glucose Monitoring Suppl (ONETOUCH VERIO FLEX SYSTEM) w/Device KIT USE TO TEST BLOOD SUGARS ONCE DAILY  0    Blood Glucose Monitoring Suppl KIT by Does not apply route daily 30 each 0    Blood Pressure KIT by Does not apply route daily 1 each 0    cetirizine-pseudoephedrine (ZyrTEC-D) 5-120 MG per tablet Take 1 tablet by mouth 2 (two) times a day 30 tablet 3    cholecalciferol (VITAMIN D3) 1,000 units tablet Take 1,000 Units by mouth daily      COLLAGEN PO Take by mouth daily at bedtime      conjugated estrogens (PREMARIN) vaginal cream Insert 1 g into the vagina 2 (two) times a day for 10 days 42 5 g 0    cyanocobalamin 500 MCG tablet Take 500 mcg by mouth daily      estradiol (ESTRACE VAGINAL) 0 1 mg/g vaginal cream Insert into the vagina      estradiol valerate (DELESTROGEN) 20 MG/ML injection Inject into a muscle      estradiol valerate (DELESTROGEN) 40 MG/ML injection Inject 1 mL (40 mg total) into a muscle every 14 (fourteen) days 5 mL 3    fenofibrate (TRICOR) 145 mg tablet Take 1 tablet (145 mg total) by mouth daily 30 tablet 3    glipiZIDE (GLUCOTROL XL) 5 mg 24 hr tablet Take 1 tablet (5 mg total) by mouth daily 30 tablet 3    glucose blood test strip Please check blood glucose three times daily 100 each 3    guaiFENesin (MUCINEX) 600 mg 12 hr tablet Take 2 tablets (1,200 mg total) by mouth every 12 (twelve) hours 30 tablet 0    Lancets (FREESTYLE) lancets Use as instructed 100 each 10    levothyroxine 150 mcg tablet Take 1 tablet (150 mcg total) by mouth daily 90 tablet 1    metFORMIN (GLUCOPHAGE) 500 mg tablet TAKE 1 TABLET BY MOUTH TWICE A DAY WITH MEALS 60 tablet 3    montelukast (SINGULAIR) 5 mg chewable tablet Chew 1 tablet (5 mg total) daily at bedtime 30 tablet 3    multivitamin (THERAGRAN) TABS Take 1 tablet by mouth daily      naproxen (NAPROSYN) 500 mg tablet Take 0 5 tablets (250 mg total) by mouth 2 (two) times a day with meals 14 tablet 0    phentermine (ADIPEX-P) 37 5 MG capsule Take 1 capsule (37 5 mg total) by mouth every morning 30 capsule 0    PREMARIN 1 25 MG tablet TAKE 2 TABLETS BY MOUTH TWICE DAILY 360 tablet 3    sitaGLIPtin-metFORMIN (JANUMET)  MG per tablet Take 1 tablet by mouth 2 (two) times a day with meals 60 tablet 3    spironolactone (ALDACTONE) 100 mg tablet TAKE 1 TABLET TWICE DAILY  180 tablet 3    zolpidem (AMBIEN CR) 12 5 MG CR tablet Take 12 5 mg by mouth every evening  2     No current facility-administered medications for this visit       Review of Systems   Constitutional: Negative for chills, fatigue, fever and weight loss  HENT: Positive for congestion and sinus pressure  Negative for ear pain, postnasal drip, rhinorrhea and sore throat  Eyes: Negative for visual disturbance  Respiratory: Positive for cough  Negative for hemoptysis, shortness of breath and wheezing  Cardiovascular: Negative for chest pain, palpitations and leg swelling  Gastrointestinal: Negative for abdominal pain, anal bleeding, constipation, diarrhea, heartburn, nausea and vomiting  Endocrine: Negative for cold intolerance and heat intolerance  Musculoskeletal: Negative for arthralgias, joint swelling and myalgias  Skin: Negative for color change and rash  Allergic/Immunologic: Negative for environmental allergies  Neurological: Negative for dizziness, seizures, syncope, weakness, light-headedness and headaches  Psychiatric/Behavioral: Negative for agitation, confusion, sleep disturbance and suicidal ideas  Objective:      /70 (BP Location: Left arm, Patient Position: Sitting, Cuff Size: Large)   Pulse 100   Temp 99 2 °F (37 3 °C) (Temporal)   Ht 5' 10" (1 778 m)   Wt 98 6 kg (217 lb 6 oz)   BMI 31 19 kg/m²          Physical Exam   Constitutional: She is oriented to person, place, and time  She appears well-developed and well-nourished  HENT:   Head: Normocephalic and atraumatic  Eyes: Pupils are equal, round, and reactive to light  Conjunctivae and EOM are normal    Neck: Normal range of motion  Neck supple  No JVD present  Cardiovascular: Normal rate, regular rhythm, normal heart sounds and intact distal pulses   Exam reveals no gallop and no friction rub  No murmur heard  Pulmonary/Chest: Effort normal and breath sounds normal  No respiratory distress  She has no wheezes  Abdominal: Soft  Bowel sounds are normal  She exhibits no distension  There is no tenderness  Musculoskeletal: Normal range of motion  Neurological: She is alert and oriented to person, place, and time  She has normal reflexes  Skin: Skin is warm and dry     Psychiatric: Her behavior is normal  Judgment and thought content normal

## 2019-02-15 NOTE — TELEPHONE ENCOUNTER
No answer, scheduled and left her a message with the appt details  She is to call the office if the date does not work for her so we can make other arrangements

## 2019-02-19 ENCOUNTER — APPOINTMENT (OUTPATIENT)
Dept: LAB | Facility: HOSPITAL | Age: 49
End: 2019-02-19
Payer: COMMERCIAL

## 2019-02-19 DIAGNOSIS — E78.1 HYPERTRIGLYCERIDEMIA: ICD-10-CM

## 2019-02-19 DIAGNOSIS — J42 CHRONIC BRONCHITIS, UNSPECIFIED CHRONIC BRONCHITIS TYPE (HCC): ICD-10-CM

## 2019-02-19 DIAGNOSIS — E03.8 OTHER SPECIFIED HYPOTHYROIDISM: ICD-10-CM

## 2019-02-19 LAB
BASOPHILS # BLD AUTO: 0.18 THOUSAND/UL (ref 0–0.1)
BASOPHILS NFR MAR MANUAL: 2 % (ref 0–1)
CHOLEST SERPL-MCNC: 294 MG/DL
EOSINOPHIL # BLD AUTO: 0.62 THOUSAND/UL (ref 0–0.4)
EOSINOPHIL NFR BLD MANUAL: 7 % (ref 0–6)
ERYTHROCYTE [DISTWIDTH] IN BLOOD BY AUTOMATED COUNT: 12.8 %
HCT VFR BLD AUTO: 39.8 % (ref 41–46)
HDLC SERPL-MCNC: 31 MG/DL (ref 40–59)
HGB BLD-MCNC: 13.6 G/DL (ref 13.5–16)
LYMPHOCYTES # BLD AUTO: 2.2 THOUSAND/UL (ref 0.5–4)
LYMPHOCYTES # BLD AUTO: 25 % (ref 25–45)
MCH RBC QN AUTO: 31.4 PG (ref 26–34)
MCHC RBC AUTO-ENTMCNC: 34.2 G/DL (ref 31–36)
MCV RBC AUTO: 92 FL (ref 80–100)
MONOCYTES # BLD AUTO: 1.76 THOUSAND/UL (ref 0.2–0.9)
MONOCYTES NFR BLD AUTO: 20 % (ref 1–10)
NEUTS SEG # BLD: 4.05 THOUSAND/UL (ref 1.8–7.8)
NEUTS SEG NFR BLD AUTO: 46 %
NONHDLC SERPL-MCNC: 263 MG/DL
PLATELET # BLD AUTO: 344 THOUSANDS/UL (ref 150–450)
PLATELET BLD QL SMEAR: ADEQUATE
PMV BLD AUTO: 9.2 FL (ref 8.9–12.7)
RBC # BLD AUTO: 4.33 MILLION/UL (ref 4.5–5.2)
RBC MORPH BLD: NORMAL
TOTAL CELLS COUNTED SPEC: 100
TRIGL SERPL-MCNC: >2625 MG/DL
TSH SERPL DL<=0.05 MIU/L-ACNC: 2.48 UIU/ML (ref 0.47–4.68)
WBC # BLD AUTO: 8.8 THOUSAND/UL (ref 4.5–11)

## 2019-02-19 PROCEDURE — 86003 ALLG SPEC IGE CRUDE XTRC EA: CPT

## 2019-02-19 PROCEDURE — 85007 BL SMEAR W/DIFF WBC COUNT: CPT

## 2019-02-19 PROCEDURE — 80061 LIPID PANEL: CPT

## 2019-02-19 PROCEDURE — 36415 COLL VENOUS BLD VENIPUNCTURE: CPT

## 2019-02-19 PROCEDURE — 82785 ASSAY OF IGE: CPT

## 2019-02-19 PROCEDURE — 84443 ASSAY THYROID STIM HORMONE: CPT

## 2019-02-19 PROCEDURE — 85027 COMPLETE CBC AUTOMATED: CPT

## 2019-02-20 LAB
A ALTERNATA IGE QN: <0.1 KUA/I
A FUMIGATUS IGE QN: <0.1 KUA/I
ALLERGEN COMMENT: ABNORMAL
BERMUDA GRASS IGE QN: <0.1 KUA/I
BOXELDER IGE QN: <0.1 KUA/I
C HERBARUM IGE QN: <0.1 KUA/I
CAT DANDER IGE QN: <0.1 KUA/I
CMN PIGWEED IGE QN: <0.1 KUA/I
COMMON RAGWEED IGE QN: <0.1 KUA/I
COTTONWOOD IGE QN: <0.1 KUA/I
D FARINAE IGE QN: <0.1 KUA/I
D PTERONYSS IGE QN: <0.1 KUA/I
DOG DANDER IGE QN: 0.2 KUA/I
LONDON PLANE IGE QN: <0.1 KUA/I
MOUSE URINE PROT IGE QN: <0.1 KUA/I
MT JUNIPER IGE QN: <0.1 KUA/I
MUGWORT IGE QN: <0.1 KUA/I
P NOTATUM IGE QN: <0.1 KUA/I
ROACH IGE QN: <0.1 KUA/I
SHEEP SORREL IGE QN: <0.1 KUA/I
SILVER BIRCH IGE QN: <0.1 KUA/I
TIMOTHY IGE QN: <0.1 KUA/I
TOTAL IGE SMQN RAST: 86.1 KU/L (ref 0–113)
WALNUT IGE QN: <0.1 KUA/I
WHITE ASH IGE QN: <0.1 KUA/I
WHITE ELM IGE QN: <0.1 KUA/I
WHITE MULBERRY IGE QN: <0.1 KUA/I
WHITE OAK IGE QN: <0.1 KUA/I

## 2019-02-27 ENCOUNTER — TRANSCRIBE ORDERS (OUTPATIENT)
Dept: ADMINISTRATIVE | Facility: HOSPITAL | Age: 49
End: 2019-02-27

## 2019-02-27 ENCOUNTER — HOSPITAL ENCOUNTER (OUTPATIENT)
Dept: CT IMAGING | Facility: HOSPITAL | Age: 49
Discharge: HOME | End: 2019-02-27
Payer: COMMERCIAL

## 2019-02-27 ENCOUNTER — HOSPITAL ENCOUNTER (OUTPATIENT)
Dept: PULMONOLOGY | Facility: HOSPITAL | Age: 49
Discharge: HOME | End: 2019-02-27
Payer: COMMERCIAL

## 2019-02-27 DIAGNOSIS — J42 CHRONIC BRONCHITIS, UNSPECIFIED CHRONIC BRONCHITIS TYPE (HCC): ICD-10-CM

## 2019-02-27 PROCEDURE — 94010 BREATHING CAPACITY TEST: CPT | Performed by: INTERNAL MEDICINE

## 2019-02-27 PROCEDURE — 94010 BREATHING CAPACITY TEST: CPT

## 2019-02-27 PROCEDURE — 94760 N-INVAS EAR/PLS OXIMETRY 1: CPT

## 2019-02-27 PROCEDURE — 71250 CT THORAX DX C-: CPT

## 2019-03-01 ENCOUNTER — OFFICE VISIT (OUTPATIENT)
Dept: OBGYN CLINIC | Facility: CLINIC | Age: 49
End: 2019-03-01

## 2019-03-01 VITALS
HEIGHT: 70 IN | WEIGHT: 220.2 LBS | BODY MASS INDEX: 31.52 KG/M2 | HEART RATE: 98 BPM | SYSTOLIC BLOOD PRESSURE: 152 MMHG | DIASTOLIC BLOOD PRESSURE: 80 MMHG

## 2019-03-01 DIAGNOSIS — Z72.51 HIGH RISK HETEROSEXUAL BEHAVIOR: ICD-10-CM

## 2019-03-01 DIAGNOSIS — Z78.9 TRANSGENDER: ICD-10-CM

## 2019-03-01 PROCEDURE — 87491 CHLMYD TRACH DNA AMP PROBE: CPT | Performed by: OBSTETRICS & GYNECOLOGY

## 2019-03-01 PROCEDURE — 99214 OFFICE O/P EST MOD 30 MIN: CPT | Performed by: OBSTETRICS & GYNECOLOGY

## 2019-03-01 PROCEDURE — 87591 N.GONORRHOEAE DNA AMP PROB: CPT | Performed by: OBSTETRICS & GYNECOLOGY

## 2019-03-01 RX ORDER — ESTRADIOL VALERATE 40 MG/ML
40 INJECTION INTRAMUSCULAR
Qty: 5 ML | Refills: 4 | Status: SHIPPED | OUTPATIENT
Start: 2019-03-01 | End: 2019-09-06 | Stop reason: SDUPTHER

## 2019-03-01 NOTE — PROGRESS NOTES
A: Nl TGF       Medical problems under control By PCP       Breast; screen    Plan:   STD screening (wanted only GC/Clhymidia)  Mammogram  Continue Estradiol  50 yr TGF without complaints  Currently being work up for allergic pulmonary issues  Here for annual exam  Nader Solomon issues being treated by PCP  PMHx Type II DM             Hyperlipidiemia              HTN  SHx ETOH, Smoking (5 Cigs/Daily)    HEENT: WNL    Lungs: Clear A &P    Breast; without masses or lesion , Implants intact    Abdoment Soft    Plevic: NEFG  Vag NL good length (12 cm), Urthera min prolapse   Rectal exam defred to PCP

## 2019-03-03 LAB
C TRACH DNA SPEC QL NAA+PROBE: NEGATIVE
N GONORRHOEA DNA SPEC QL NAA+PROBE: NEGATIVE

## 2019-03-25 DIAGNOSIS — Z78.9 TRANSGENDER: Primary | ICD-10-CM

## 2019-03-29 RX ORDER — SYRINGE WITH NEEDLE, 1 ML 25GX5/8"
SYRINGE, EMPTY DISPOSABLE MISCELLANEOUS
Qty: 30 EACH | Refills: 0 | Status: SHIPPED | OUTPATIENT
Start: 2019-03-29 | End: 2021-02-12 | Stop reason: SDUPTHER

## 2019-04-11 ENCOUNTER — OFFICE VISIT (OUTPATIENT)
Dept: FAMILY MEDICINE CLINIC | Facility: CLINIC | Age: 49
End: 2019-04-11

## 2019-04-11 VITALS
RESPIRATION RATE: 16 BRPM | BODY MASS INDEX: 31.14 KG/M2 | TEMPERATURE: 97.8 F | SYSTOLIC BLOOD PRESSURE: 138 MMHG | WEIGHT: 217 LBS | HEART RATE: 88 BPM | DIASTOLIC BLOOD PRESSURE: 86 MMHG

## 2019-04-11 DIAGNOSIS — Z91.09 ENVIRONMENTAL ALLERGIES: ICD-10-CM

## 2019-04-11 DIAGNOSIS — E78.1 HYPERTRIGLYCERIDEMIA: ICD-10-CM

## 2019-04-11 DIAGNOSIS — G44.209 TENSION HEADACHE: Primary | ICD-10-CM

## 2019-04-11 DIAGNOSIS — E11.9 TYPE 2 DIABETES MELLITUS WITHOUT COMPLICATION, WITHOUT LONG-TERM CURRENT USE OF INSULIN (HCC): ICD-10-CM

## 2019-04-11 DIAGNOSIS — E03.9 ACQUIRED HYPOTHYROIDISM: ICD-10-CM

## 2019-04-11 DIAGNOSIS — I10 HTN, GOAL BELOW 140/90: ICD-10-CM

## 2019-04-11 PROCEDURE — 96372 THER/PROPH/DIAG INJ SC/IM: CPT | Performed by: INTERNAL MEDICINE

## 2019-04-11 PROCEDURE — 99214 OFFICE O/P EST MOD 30 MIN: CPT | Performed by: INTERNAL MEDICINE

## 2019-04-11 RX ORDER — ROSUVASTATIN CALCIUM 20 MG/1
20 TABLET, COATED ORAL DAILY
Qty: 30 TABLET | Refills: 3 | Status: SHIPPED | OUTPATIENT
Start: 2019-04-11 | End: 2019-11-14 | Stop reason: SDUPTHER

## 2019-04-11 RX ORDER — KETOROLAC TROMETHAMINE 30 MG/ML
30 INJECTION, SOLUTION INTRAMUSCULAR; INTRAVENOUS ONCE
Status: SHIPPED | OUTPATIENT
Start: 2019-04-11 | End: 2019-04-16

## 2019-04-11 RX ORDER — OMEGA-3-ACID ETHYL ESTERS 1 G/1
2 CAPSULE, LIQUID FILLED ORAL 2 TIMES DAILY
Qty: 60 CAPSULE | Refills: 2 | Status: CANCELLED | OUTPATIENT
Start: 2019-04-11

## 2019-04-11 RX ORDER — FLUTICASONE PROPIONATE 50 MCG
1 SPRAY, SUSPENSION (ML) NASAL DAILY
Qty: 1 BOTTLE | Refills: 1 | Status: SHIPPED | OUTPATIENT
Start: 2019-04-11 | End: 2019-04-17 | Stop reason: SDUPTHER

## 2019-04-11 RX ORDER — CETIRIZINE HYDROCHLORIDE 10 MG/1
10 TABLET, CHEWABLE ORAL DAILY
Qty: 30 TABLET | Refills: 3 | Status: SHIPPED | OUTPATIENT
Start: 2019-04-11 | End: 2019-04-17 | Stop reason: SDUPTHER

## 2019-04-11 RX ORDER — OMEGA-3-ACID ETHYL ESTERS 1 G/1
2 CAPSULE, LIQUID FILLED ORAL 2 TIMES DAILY
Qty: 60 CAPSULE | Refills: 3 | Status: SHIPPED | OUTPATIENT
Start: 2019-04-11 | End: 2019-11-14 | Stop reason: SDUPTHER

## 2019-04-11 RX ORDER — KETOROLAC TROMETHAMINE 30 MG/ML
30 INJECTION, SOLUTION INTRAMUSCULAR; INTRAVENOUS ONCE
Status: COMPLETED | OUTPATIENT
Start: 2019-04-11 | End: 2019-04-11

## 2019-04-11 RX ORDER — LISINOPRIL 2.5 MG/1
2.5 TABLET ORAL DAILY
Start: 2019-04-11 | End: 2019-06-17 | Stop reason: SDUPTHER

## 2019-04-11 RX ADMIN — KETOROLAC TROMETHAMINE 30 MG: 30 INJECTION, SOLUTION INTRAMUSCULAR; INTRAVENOUS at 11:49

## 2019-04-12 ENCOUNTER — TELEPHONE (OUTPATIENT)
Dept: FAMILY MEDICINE CLINIC | Facility: CLINIC | Age: 49
End: 2019-04-12

## 2019-04-12 DIAGNOSIS — Z78.9 TRANSGENDER: ICD-10-CM

## 2019-04-16 RX ORDER — ESTROGENS, CONJUGATED 1.25 MG
TABLET ORAL
Qty: 360 TABLET | Refills: 1 | Status: SHIPPED | OUTPATIENT
Start: 2019-04-16 | End: 2019-11-21 | Stop reason: SDUPTHER

## 2019-04-17 ENCOUNTER — OFFICE VISIT (OUTPATIENT)
Dept: FAMILY MEDICINE CLINIC | Facility: CLINIC | Age: 49
End: 2019-04-17

## 2019-04-17 VITALS
RESPIRATION RATE: 18 BRPM | SYSTOLIC BLOOD PRESSURE: 150 MMHG | DIASTOLIC BLOOD PRESSURE: 94 MMHG | BODY MASS INDEX: 30.85 KG/M2 | TEMPERATURE: 99.1 F | WEIGHT: 215 LBS

## 2019-04-17 DIAGNOSIS — G44.209 TENSION HEADACHE: Primary | ICD-10-CM

## 2019-04-17 DIAGNOSIS — Z91.09 ENVIRONMENTAL ALLERGIES: ICD-10-CM

## 2019-04-17 PROCEDURE — 99213 OFFICE O/P EST LOW 20 MIN: CPT | Performed by: FAMILY MEDICINE

## 2019-04-17 RX ORDER — CETIRIZINE HYDROCHLORIDE 10 MG/1
10 TABLET, CHEWABLE ORAL DAILY
Qty: 30 TABLET | Refills: 3 | Status: SHIPPED | OUTPATIENT
Start: 2019-04-17 | End: 2019-11-14

## 2019-04-17 RX ORDER — NAPROXEN 500 MG/1
500 TABLET ORAL 2 TIMES DAILY WITH MEALS
Qty: 30 TABLET | Refills: 0 | Status: SHIPPED | OUTPATIENT
Start: 2019-04-17 | End: 2019-04-19

## 2019-04-17 RX ORDER — FLUTICASONE PROPIONATE 50 MCG
1 SPRAY, SUSPENSION (ML) NASAL DAILY
Qty: 1 BOTTLE | Refills: 1 | Status: SHIPPED | OUTPATIENT
Start: 2019-04-17 | End: 2019-06-24 | Stop reason: SDUPTHER

## 2019-04-19 ENCOUNTER — TELEPHONE (OUTPATIENT)
Dept: FAMILY MEDICINE CLINIC | Facility: CLINIC | Age: 49
End: 2019-04-19

## 2019-04-19 DIAGNOSIS — G44.209 TENSION HEADACHE: Primary | ICD-10-CM

## 2019-04-19 RX ORDER — NAPROXEN 500 MG/1
500 TABLET ORAL 2 TIMES DAILY WITH MEALS
Qty: 30 TABLET | Refills: 0 | Status: SHIPPED | OUTPATIENT
Start: 2019-04-19 | End: 2019-05-03 | Stop reason: SDUPTHER

## 2019-04-24 ENCOUNTER — TELEPHONE (OUTPATIENT)
Dept: FAMILY MEDICINE CLINIC | Facility: CLINIC | Age: 49
End: 2019-04-24

## 2019-05-03 DIAGNOSIS — E11.9 TYPE 2 DIABETES MELLITUS WITHOUT COMPLICATION, WITHOUT LONG-TERM CURRENT USE OF INSULIN (HCC): ICD-10-CM

## 2019-05-03 DIAGNOSIS — I10 HTN, GOAL BELOW 140/90: ICD-10-CM

## 2019-05-03 DIAGNOSIS — J42 CHRONIC BRONCHITIS, UNSPECIFIED CHRONIC BRONCHITIS TYPE (HCC): ICD-10-CM

## 2019-05-03 DIAGNOSIS — G44.209 TENSION HEADACHE: ICD-10-CM

## 2019-05-03 RX ORDER — MONTELUKAST SODIUM 5 MG/1
5 TABLET, CHEWABLE ORAL
Qty: 30 TABLET | Refills: 1 | OUTPATIENT
Start: 2019-05-03

## 2019-05-03 RX ORDER — LISINOPRIL 2.5 MG/1
TABLET ORAL
Qty: 30 TABLET | Refills: 1 | Status: SHIPPED | OUTPATIENT
Start: 2019-05-03 | End: 2019-06-10 | Stop reason: SDUPTHER

## 2019-05-03 RX ORDER — GLIPIZIDE 5 MG/1
TABLET, FILM COATED, EXTENDED RELEASE ORAL
Qty: 30 TABLET | Refills: 1 | Status: SHIPPED | OUTPATIENT
Start: 2019-05-03 | End: 2019-06-24 | Stop reason: SDUPTHER

## 2019-05-03 RX ORDER — SITAGLIPTIN AND METFORMIN HYDROCHLORIDE 500; 50 MG/1; MG/1
TABLET, FILM COATED ORAL
Qty: 60 TABLET | Refills: 1 | Status: SHIPPED | OUTPATIENT
Start: 2019-05-03 | End: 2019-06-24 | Stop reason: SDUPTHER

## 2019-05-03 RX ORDER — NAPROXEN 500 MG/1
500 TABLET ORAL 2 TIMES DAILY WITH MEALS
Qty: 30 TABLET | Refills: 0 | Status: SHIPPED | OUTPATIENT
Start: 2019-05-03 | End: 2019-11-14

## 2019-05-19 ENCOUNTER — OFFICE VISIT (OUTPATIENT)
Dept: URGENT CARE | Facility: MEDICAL CENTER | Age: 49
End: 2019-05-19
Payer: COMMERCIAL

## 2019-05-19 VITALS
HEART RATE: 68 BPM | OXYGEN SATURATION: 98 % | RESPIRATION RATE: 20 BRPM | SYSTOLIC BLOOD PRESSURE: 128 MMHG | DIASTOLIC BLOOD PRESSURE: 80 MMHG | TEMPERATURE: 96.8 F

## 2019-05-19 DIAGNOSIS — R30.0 DYSURIA: ICD-10-CM

## 2019-05-19 DIAGNOSIS — N30.90 CYSTITIS: Primary | ICD-10-CM

## 2019-05-19 DIAGNOSIS — B37.0 THRUSH: ICD-10-CM

## 2019-05-19 DIAGNOSIS — R21 RASH: ICD-10-CM

## 2019-05-19 LAB
SL AMB  POCT GLUCOSE, UA: 2000
SL AMB LEUKOCYTE ESTERASE,UA: NEGATIVE
SL AMB POCT BILIRUBIN,UA: NEGATIVE
SL AMB POCT BLOOD,UA: NEGATIVE
SL AMB POCT CLARITY,UA: CLEAR
SL AMB POCT COLOR,UA: YELLOW
SL AMB POCT KETONES,UA: NEGATIVE
SL AMB POCT NITRITE,UA: NEGATIVE
SL AMB POCT PH,UA: 6
SL AMB POCT SPECIFIC GRAVITY,UA: 1.01
SL AMB POCT URINE PROTEIN: ABNORMAL
SL AMB POCT UROBILINOGEN: NEGATIVE

## 2019-05-19 PROCEDURE — 81002 URINALYSIS NONAUTO W/O SCOPE: CPT | Performed by: FAMILY MEDICINE

## 2019-05-19 PROCEDURE — 87086 URINE CULTURE/COLONY COUNT: CPT

## 2019-05-19 PROCEDURE — G0383 LEV 4 HOSP TYPE B ED VISIT: HCPCS | Performed by: FAMILY MEDICINE

## 2019-05-19 RX ORDER — SULFAMETHOXAZOLE AND TRIMETHOPRIM 800; 160 MG/1; MG/1
1 TABLET ORAL EVERY 12 HOURS SCHEDULED
Qty: 28 TABLET | Refills: 0 | Status: SHIPPED | OUTPATIENT
Start: 2019-05-19 | End: 2019-06-02

## 2019-05-20 ENCOUNTER — TELEPHONE (OUTPATIENT)
Dept: FAMILY MEDICINE CLINIC | Facility: CLINIC | Age: 49
End: 2019-05-20

## 2019-05-20 LAB — BACTERIA UR CULT: NORMAL

## 2019-06-03 ENCOUNTER — OFFICE VISIT (OUTPATIENT)
Dept: FAMILY MEDICINE CLINIC | Facility: CLINIC | Age: 49
End: 2019-06-03

## 2019-06-03 VITALS
OXYGEN SATURATION: 98 % | BODY MASS INDEX: 29.89 KG/M2 | WEIGHT: 208.31 LBS | DIASTOLIC BLOOD PRESSURE: 72 MMHG | RESPIRATION RATE: 18 BRPM | HEART RATE: 112 BPM | SYSTOLIC BLOOD PRESSURE: 150 MMHG | TEMPERATURE: 97.5 F

## 2019-06-03 DIAGNOSIS — R61 UNEXPLAINED NIGHT SWEATS: ICD-10-CM

## 2019-06-03 DIAGNOSIS — R21 RASH AND NONSPECIFIC SKIN ERUPTION: Primary | ICD-10-CM

## 2019-06-03 PROCEDURE — 99213 OFFICE O/P EST LOW 20 MIN: CPT | Performed by: FAMILY MEDICINE

## 2019-06-03 RX ORDER — PREDNISONE 20 MG/1
40 TABLET ORAL DAILY
Qty: 10 TABLET | Refills: 0 | Status: SHIPPED | OUTPATIENT
Start: 2019-06-03 | End: 2019-06-03 | Stop reason: SDUPTHER

## 2019-06-03 RX ORDER — CETIRIZINE HYDROCHLORIDE 10 MG/1
10 TABLET ORAL DAILY
Refills: 3 | COMMUNITY
Start: 2019-04-18 | End: 2019-06-06

## 2019-06-03 RX ORDER — PREDNISONE 20 MG/1
40 TABLET ORAL DAILY
Qty: 10 TABLET | Refills: 0 | Status: SHIPPED | OUTPATIENT
Start: 2019-06-03 | End: 2019-06-08

## 2019-06-04 ENCOUNTER — TRANSCRIBE ORDERS (OUTPATIENT)
Dept: ADMINISTRATIVE | Facility: HOSPITAL | Age: 49
End: 2019-06-04

## 2019-06-04 ENCOUNTER — APPOINTMENT (OUTPATIENT)
Dept: LAB | Facility: HOSPITAL | Age: 49
End: 2019-06-04
Payer: COMMERCIAL

## 2019-06-04 DIAGNOSIS — R61 UNEXPLAINED NIGHT SWEATS: ICD-10-CM

## 2019-06-04 DIAGNOSIS — R21 RASH AND NONSPECIFIC SKIN ERUPTION: ICD-10-CM

## 2019-06-04 LAB
ALBUMIN SERPL BCP-MCNC: 3 G/DL (ref 3–5.2)
ALP SERPL-CCNC: 86 U/L (ref 43–122)
ALT SERPL W P-5'-P-CCNC: 9 U/L (ref 9–52)
ANION GAP SERPL CALCULATED.3IONS-SCNC: 20 MMOL/L (ref 5–14)
AST SERPL W P-5'-P-CCNC: 12 U/L (ref 17–36)
BILIRUB SERPL-MCNC: 0.6 MG/DL
BUN SERPL-MCNC: 19 MG/DL (ref 5–25)
CALCIUM SERPL-MCNC: 9 MG/DL (ref 8.4–10.2)
CHLORIDE SERPL-SCNC: 97 MMOL/L (ref 97–108)
CO2 SERPL-SCNC: 12 MMOL/L (ref 22–30)
CREAT SERPL-MCNC: 0.55 MG/DL (ref 0.7–1.2)
CRP SERPL QL: 12.8 MG/L
EOSINOPHIL # BLD AUTO: 0.08 THOUSAND/UL (ref 0–0.4)
EOSINOPHIL NFR BLD MANUAL: 1 % (ref 0–6)
ERYTHROCYTE [DISTWIDTH] IN BLOOD BY AUTOMATED COUNT: 13.5 %
ERYTHROCYTE [SEDIMENTATION RATE] IN BLOOD: 52 MM/HOUR (ref 1–20)
GLUCOSE P FAST SERPL-MCNC: 361 MG/DL (ref 70–99)
HCT VFR BLD AUTO: 42.1 % (ref 41–46)
HGB BLD-MCNC: 14.3 G/DL (ref 13.5–16)
LYMPHOCYTES # BLD AUTO: 1.95 THOUSAND/UL (ref 0.5–4)
LYMPHOCYTES # BLD AUTO: 25 % (ref 25–45)
MCH RBC QN AUTO: 31.4 PG (ref 26–34)
MCHC RBC AUTO-ENTMCNC: 34 G/DL (ref 31–36)
MCV RBC AUTO: 92 FL (ref 80–100)
MONOCYTES # BLD AUTO: 0.39 THOUSAND/UL (ref 0.2–0.9)
MONOCYTES NFR BLD AUTO: 5 % (ref 1–10)
NEUTS SEG # BLD: 5.38 THOUSAND/UL (ref 1.8–7.8)
NEUTS SEG NFR BLD AUTO: 69 %
PLATELET # BLD AUTO: 308 THOUSANDS/UL (ref 150–450)
PLATELET BLD QL SMEAR: ADEQUATE
PMV BLD AUTO: 10.4 FL (ref 8.9–12.7)
POTASSIUM SERPL-SCNC: 4.5 MMOL/L (ref 3.6–5)
PROT SERPL-MCNC: 7.8 G/DL (ref 5.9–8.4)
RBC # BLD AUTO: 4.57 MILLION/UL (ref 4.5–5.2)
RBC MORPH BLD: NORMAL
SODIUM SERPL-SCNC: 129 MMOL/L (ref 137–147)
TOTAL CELLS COUNTED SPEC: 100
TSH SERPL DL<=0.05 MIU/L-ACNC: 1.86 UIU/ML (ref 0.47–4.68)
WBC # BLD AUTO: 7.8 THOUSAND/UL (ref 4.5–11)

## 2019-06-04 PROCEDURE — 80053 COMPREHEN METABOLIC PANEL: CPT

## 2019-06-04 PROCEDURE — 85007 BL SMEAR W/DIFF WBC COUNT: CPT

## 2019-06-04 PROCEDURE — 84443 ASSAY THYROID STIM HORMONE: CPT

## 2019-06-04 PROCEDURE — 85652 RBC SED RATE AUTOMATED: CPT

## 2019-06-04 PROCEDURE — 85027 COMPLETE CBC AUTOMATED: CPT

## 2019-06-04 PROCEDURE — 36415 COLL VENOUS BLD VENIPUNCTURE: CPT

## 2019-06-04 PROCEDURE — 86140 C-REACTIVE PROTEIN: CPT

## 2019-06-04 PROCEDURE — 86038 ANTINUCLEAR ANTIBODIES: CPT

## 2019-06-05 LAB — RYE IGE QN: NEGATIVE

## 2019-06-06 ENCOUNTER — TELEPHONE (OUTPATIENT)
Dept: FAMILY MEDICINE CLINIC | Facility: CLINIC | Age: 49
End: 2019-06-06

## 2019-06-06 ENCOUNTER — APPOINTMENT (OUTPATIENT)
Dept: LAB | Facility: HOSPITAL | Age: 49
End: 2019-06-06
Payer: COMMERCIAL

## 2019-06-06 DIAGNOSIS — Z11.1 SCREENING-PULMONARY TB: ICD-10-CM

## 2019-06-06 DIAGNOSIS — Z11.1 SCREENING-PULMONARY TB: Primary | ICD-10-CM

## 2019-06-06 DIAGNOSIS — R61 UNEXPLAINED NIGHT SWEATS: Primary | ICD-10-CM

## 2019-06-06 DIAGNOSIS — R61 UNEXPLAINED NIGHT SWEATS: ICD-10-CM

## 2019-06-06 PROCEDURE — 36415 COLL VENOUS BLD VENIPUNCTURE: CPT

## 2019-06-06 PROCEDURE — 86480 TB TEST CELL IMMUN MEASURE: CPT

## 2019-06-06 PROCEDURE — 80074 ACUTE HEPATITIS PANEL: CPT

## 2019-06-07 LAB
HAV IGM SER QL: NORMAL
HBV CORE IGM SER QL: NORMAL
HBV SURFACE AG SER QL: NORMAL
HCV AB SER QL: NORMAL

## 2019-06-10 DIAGNOSIS — I10 HTN, GOAL BELOW 140/90: ICD-10-CM

## 2019-06-10 DIAGNOSIS — E11.9 TYPE 2 DIABETES MELLITUS WITHOUT COMPLICATION, WITHOUT LONG-TERM CURRENT USE OF INSULIN (HCC): ICD-10-CM

## 2019-06-10 LAB
GAMMA INTERFERON BACKGROUND BLD IA-ACNC: 0.28 IU/ML
M TB IFN-G BLD-IMP: NEGATIVE
M TB IFN-G CD4+ BCKGRND COR BLD-ACNC: -0.19 IU/ML
M TB IFN-G CD4+ BCKGRND COR BLD-ACNC: -0.21 IU/ML
MITOGEN IGNF BCKGRD COR BLD-ACNC: >10 IU/ML

## 2019-06-10 PROCEDURE — 4010F ACE/ARB THERAPY RXD/TAKEN: CPT | Performed by: FAMILY MEDICINE

## 2019-06-10 RX ORDER — LISINOPRIL 2.5 MG/1
TABLET ORAL
Qty: 30 TABLET | Refills: 0 | Status: SHIPPED | OUTPATIENT
Start: 2019-06-10 | End: 2019-06-13

## 2019-06-12 ENCOUNTER — TELEPHONE (OUTPATIENT)
Dept: FAMILY MEDICINE CLINIC | Facility: CLINIC | Age: 49
End: 2019-06-12

## 2019-06-13 ENCOUNTER — OFFICE VISIT (OUTPATIENT)
Dept: FAMILY MEDICINE CLINIC | Facility: CLINIC | Age: 49
End: 2019-06-13

## 2019-06-13 VITALS
SYSTOLIC BLOOD PRESSURE: 150 MMHG | OXYGEN SATURATION: 98 % | TEMPERATURE: 98 F | WEIGHT: 209 LBS | HEART RATE: 106 BPM | DIASTOLIC BLOOD PRESSURE: 82 MMHG | BODY MASS INDEX: 29.99 KG/M2

## 2019-06-13 DIAGNOSIS — E11.9 TYPE 2 DIABETES MELLITUS WITHOUT COMPLICATION, WITHOUT LONG-TERM CURRENT USE OF INSULIN (HCC): Primary | ICD-10-CM

## 2019-06-13 DIAGNOSIS — R21 RASH AND NONSPECIFIC SKIN ERUPTION: ICD-10-CM

## 2019-06-13 LAB — SL AMB POCT HEMOGLOBIN AIC: 12.6 (ref ?–6.5)

## 2019-06-13 PROCEDURE — 99213 OFFICE O/P EST LOW 20 MIN: CPT | Performed by: FAMILY MEDICINE

## 2019-06-13 PROCEDURE — 83036 HEMOGLOBIN GLYCOSYLATED A1C: CPT | Performed by: FAMILY MEDICINE

## 2019-06-13 RX ORDER — MONTELUKAST SODIUM 5 MG/1
TABLET, CHEWABLE ORAL
Refills: 3 | COMMUNITY
Start: 2019-06-02 | End: 2019-09-23 | Stop reason: SDUPTHER

## 2019-06-13 RX ORDER — PREDNISONE 10 MG/1
TABLET ORAL
Refills: 0 | COMMUNITY
Start: 2019-06-03 | End: 2019-11-14

## 2019-06-14 ENCOUNTER — TELEPHONE (OUTPATIENT)
Dept: FAMILY MEDICINE CLINIC | Facility: CLINIC | Age: 49
End: 2019-06-14

## 2019-06-17 DIAGNOSIS — I10 HTN, GOAL BELOW 140/90: ICD-10-CM

## 2019-06-17 PROCEDURE — 4010F ACE/ARB THERAPY RXD/TAKEN: CPT | Performed by: FAMILY MEDICINE

## 2019-06-17 RX ORDER — LISINOPRIL 2.5 MG/1
2.5 TABLET ORAL DAILY
Qty: 90 TABLET | Refills: 2 | Status: SHIPPED | OUTPATIENT
Start: 2019-06-17 | End: 2019-11-14

## 2019-06-23 DIAGNOSIS — E78.2 MIXED HYPERLIPIDEMIA: ICD-10-CM

## 2019-06-24 DIAGNOSIS — E11.9 TYPE 2 DIABETES MELLITUS WITHOUT COMPLICATION, WITHOUT LONG-TERM CURRENT USE OF INSULIN (HCC): ICD-10-CM

## 2019-06-24 DIAGNOSIS — G44.209 TENSION HEADACHE: ICD-10-CM

## 2019-06-24 RX ORDER — FENOFIBRATE 145 MG/1
TABLET, COATED ORAL
Qty: 30 TABLET | Refills: 0 | OUTPATIENT
Start: 2019-06-24

## 2019-06-24 RX ORDER — SITAGLIPTIN AND METFORMIN HYDROCHLORIDE 500; 50 MG/1; MG/1
TABLET, FILM COATED ORAL
Qty: 60 TABLET | Refills: 3 | Status: SHIPPED | OUTPATIENT
Start: 2019-06-24 | End: 2019-11-14 | Stop reason: SDUPTHER

## 2019-06-24 RX ORDER — FLUTICASONE PROPIONATE 50 MCG
SPRAY, SUSPENSION (ML) NASAL
Qty: 1 BOTTLE | Refills: 0 | Status: SHIPPED | OUTPATIENT
Start: 2019-06-24 | End: 2019-11-14

## 2019-06-24 RX ORDER — GLIPIZIDE 5 MG/1
TABLET, FILM COATED, EXTENDED RELEASE ORAL
Qty: 30 TABLET | Refills: 0 | Status: SHIPPED | OUTPATIENT
Start: 2019-06-24 | End: 2019-11-14 | Stop reason: SDUPTHER

## 2019-06-25 DIAGNOSIS — J42 CHRONIC BRONCHITIS, UNSPECIFIED CHRONIC BRONCHITIS TYPE (HCC): ICD-10-CM

## 2019-06-26 RX ORDER — MONTELUKAST SODIUM 5 MG/1
5 TABLET, CHEWABLE ORAL
Qty: 30 TABLET | Refills: 3 | OUTPATIENT
Start: 2019-06-26

## 2019-07-02 DIAGNOSIS — E03.8 OTHER SPECIFIED HYPOTHYROIDISM: ICD-10-CM

## 2019-07-03 RX ORDER — LEVOTHYROXINE SODIUM 0.15 MG/1
TABLET ORAL
Qty: 90 TABLET | Refills: 1 | Status: SHIPPED | OUTPATIENT
Start: 2019-07-03 | End: 2019-11-14 | Stop reason: SDUPTHER

## 2019-07-03 NOTE — TELEPHONE ENCOUNTER
Prescription refilled  Patient needs a follow-up appointment with Dr Reinier Sinha in September  Will contact clerical staff

## 2019-07-03 NOTE — TELEPHONE ENCOUNTER
LVM informing pt of meds, also informing pt to contact office to set up appt with dr Margarita Jacobs in September

## 2019-07-18 DIAGNOSIS — E78.2 MIXED HYPERLIPIDEMIA: ICD-10-CM

## 2019-07-18 RX ORDER — FENOFIBRATE 145 MG/1
TABLET, COATED ORAL
Qty: 90 TABLET | Refills: 1 | OUTPATIENT
Start: 2019-07-18

## 2019-07-19 NOTE — TELEPHONE ENCOUNTER
Please contact pharmacy and let them know that this medication was discontinued by Dr Flo Long in April  Thank you

## 2019-07-22 DIAGNOSIS — G44.209 TENSION HEADACHE: ICD-10-CM

## 2019-07-22 RX ORDER — FLUTICASONE PROPIONATE 50 MCG
SPRAY, SUSPENSION (ML) NASAL
Qty: 16 ML | Refills: 1 | Status: SHIPPED | OUTPATIENT
Start: 2019-07-22 | End: 2019-09-06 | Stop reason: SDUPTHER

## 2019-08-28 DIAGNOSIS — E11.9 TYPE 2 DIABETES MELLITUS WITHOUT COMPLICATION, WITHOUT LONG-TERM CURRENT USE OF INSULIN (HCC): Primary | ICD-10-CM

## 2019-08-28 RX ORDER — ASPIRIN 81 MG/1
TABLET ORAL
Qty: 90 TABLET | Refills: 0 | Status: SHIPPED | OUTPATIENT
Start: 2019-08-28 | End: 2019-11-14 | Stop reason: SDUPTHER

## 2019-09-06 ENCOUNTER — OFFICE VISIT (OUTPATIENT)
Dept: OBGYN CLINIC | Facility: CLINIC | Age: 49
End: 2019-09-06

## 2019-09-06 VITALS
WEIGHT: 196 LBS | DIASTOLIC BLOOD PRESSURE: 80 MMHG | HEART RATE: 100 BPM | BODY MASS INDEX: 28.06 KG/M2 | HEIGHT: 70 IN | SYSTOLIC BLOOD PRESSURE: 146 MMHG

## 2019-09-06 DIAGNOSIS — Z78.9 TRANSGENDER: Primary | ICD-10-CM

## 2019-09-06 PROCEDURE — 99213 OFFICE O/P EST LOW 20 MIN: CPT | Performed by: OBSTETRICS & GYNECOLOGY

## 2019-09-06 RX ORDER — ESTRADIOL 0.1 MG/G
CREAM VAGINAL
Qty: 42.5 G | Refills: 0 | Status: SHIPPED | OUTPATIENT
Start: 2019-09-06 | End: 2019-11-14

## 2019-09-06 RX ORDER — ESTRADIOL VALERATE 40 MG/ML
20 INJECTION INTRAMUSCULAR
Qty: 5 ML | Refills: 3 | Status: SHIPPED | OUTPATIENT
Start: 2019-09-06 | End: 2020-02-04

## 2019-09-06 RX ORDER — ESTRADIOL VALERATE 40 MG/ML
40 INJECTION INTRAMUSCULAR
Qty: 5 ML | Refills: 4 | Status: SHIPPED | OUTPATIENT
Start: 2019-09-06 | End: 2021-07-27

## 2019-09-06 NOTE — PROGRESS NOTES
This is a 51-year-old transgender female well known to me  She is currently doing well being treated for her diabetes  She is complaining of some blood on the toilet paper after urinatio    PMH:  Type 2 diabetes being treated with insulin   thyroid disease on Synthroid   lipid disorder     exam:   irritated shown noted on left side of prominent urethra     also noted is skin changes in the area vulva appears to be atrophic       Assessment;    urethral irritation   Plan:     esses estrogen cream applied twice daily to irritation  If this does not resolve in the next month we will have patient return  she also has an appointment with a dermatologist in the near future

## 2019-09-21 DIAGNOSIS — J42 CHRONIC BRONCHITIS, UNSPECIFIED CHRONIC BRONCHITIS TYPE (HCC): ICD-10-CM

## 2019-09-23 DIAGNOSIS — J42 CHRONIC BRONCHITIS, UNSPECIFIED CHRONIC BRONCHITIS TYPE (HCC): Primary | ICD-10-CM

## 2019-09-23 RX ORDER — MONTELUKAST SODIUM 5 MG/1
5 TABLET, CHEWABLE ORAL
Qty: 30 TABLET | Refills: 3 | OUTPATIENT
Start: 2019-09-23

## 2019-09-23 NOTE — TELEPHONE ENCOUNTER
I can't see the denial reason from 9/21 and pt is calling about it  Confirmed with pharmacy this is what she's been getting from them and it's due to be filled for next week

## 2019-09-24 RX ORDER — MONTELUKAST SODIUM 5 MG/1
5 TABLET, CHEWABLE ORAL
Qty: 30 TABLET | Refills: 2 | Status: SHIPPED | OUTPATIENT
Start: 2019-09-24 | End: 2019-11-14

## 2019-11-14 ENCOUNTER — OFFICE VISIT (OUTPATIENT)
Dept: FAMILY MEDICINE CLINIC | Facility: CLINIC | Age: 49
End: 2019-11-14

## 2019-11-14 VITALS
BODY MASS INDEX: 26.98 KG/M2 | RESPIRATION RATE: 16 BRPM | TEMPERATURE: 97.9 F | WEIGHT: 188 LBS | DIASTOLIC BLOOD PRESSURE: 76 MMHG | SYSTOLIC BLOOD PRESSURE: 112 MMHG

## 2019-11-14 DIAGNOSIS — I10 HTN, GOAL BELOW 140/90: ICD-10-CM

## 2019-11-14 DIAGNOSIS — E03.9 ACQUIRED HYPOTHYROIDISM: ICD-10-CM

## 2019-11-14 DIAGNOSIS — Z23 NEED FOR VACCINATION: ICD-10-CM

## 2019-11-14 DIAGNOSIS — E11.9 TYPE 2 DIABETES MELLITUS WITHOUT COMPLICATION, WITHOUT LONG-TERM CURRENT USE OF INSULIN (HCC): Primary | ICD-10-CM

## 2019-11-14 DIAGNOSIS — E78.1 HYPERTRIGLYCERIDEMIA: ICD-10-CM

## 2019-11-14 DIAGNOSIS — E75.5 XANTHOMA: ICD-10-CM

## 2019-11-14 DIAGNOSIS — E03.8 OTHER SPECIFIED HYPOTHYROIDISM: ICD-10-CM

## 2019-11-14 PROBLEM — Z11.1 SCREENING-PULMONARY TB: Status: RESOLVED | Noted: 2019-06-06 | Resolved: 2019-11-14

## 2019-11-14 LAB — SL AMB POCT HEMOGLOBIN AIC: 13.8 (ref ?–6.5)

## 2019-11-14 PROCEDURE — 99214 OFFICE O/P EST MOD 30 MIN: CPT | Performed by: FAMILY MEDICINE

## 2019-11-14 PROCEDURE — 3078F DIAST BP <80 MM HG: CPT | Performed by: FAMILY MEDICINE

## 2019-11-14 PROCEDURE — 3074F SYST BP LT 130 MM HG: CPT | Performed by: FAMILY MEDICINE

## 2019-11-14 PROCEDURE — 3046F HEMOGLOBIN A1C LEVEL >9.0%: CPT | Performed by: FAMILY MEDICINE

## 2019-11-14 PROCEDURE — 83036 HEMOGLOBIN GLYCOSYLATED A1C: CPT | Performed by: FAMILY MEDICINE

## 2019-11-14 RX ORDER — LEVOTHYROXINE SODIUM 0.15 MG/1
150 TABLET ORAL DAILY
Qty: 30 TABLET | Refills: 3 | Status: SHIPPED | OUTPATIENT
Start: 2019-11-14 | End: 2020-02-17

## 2019-11-14 RX ORDER — OMEGA-3-ACID ETHYL ESTERS 1 G/1
2 CAPSULE, LIQUID FILLED ORAL 2 TIMES DAILY
Qty: 60 CAPSULE | Refills: 3 | Status: SHIPPED | OUTPATIENT
Start: 2019-11-14 | End: 2020-10-21

## 2019-11-14 RX ORDER — ASPIRIN 81 MG/1
81 TABLET ORAL DAILY
Qty: 30 TABLET | Refills: 3 | Status: SHIPPED | OUTPATIENT
Start: 2019-11-14 | End: 2020-05-07

## 2019-11-14 RX ORDER — ROSUVASTATIN CALCIUM 20 MG/1
20 TABLET, COATED ORAL DAILY
Qty: 30 TABLET | Refills: 3 | Status: SHIPPED | OUTPATIENT
Start: 2019-11-14 | End: 2020-02-17

## 2019-11-14 RX ORDER — GLIPIZIDE 5 MG/1
5 TABLET, FILM COATED, EXTENDED RELEASE ORAL DAILY
Qty: 30 TABLET | Refills: 3 | Status: SHIPPED | OUTPATIENT
Start: 2019-11-14 | End: 2020-10-21 | Stop reason: SDUPTHER

## 2019-11-14 NOTE — ASSESSMENT & PLAN NOTE
- will obtain lipid panel   - however patient has not been taking her Crestor oral of Rianna as indicated on last visit  These have been recent to the pharmacy today and patient will take them as prescribed  - extensive dietary counseling given to the patient

## 2019-11-14 NOTE — ASSESSMENT & PLAN NOTE
Lab Results   Component Value Date    HGBA1C 13 8 (A) 11/14/2019         - hemoglobin A1c has worsened this patient is not taking her medications in 2 months  Insulin therapy was discussed with patient in detail and she continues to refuse it adamantly  - therefore discussed with patient that she has to be compliant with her oral medications which include glipizide 5 mg daily and Janumet  twice a day  - discussed in detail about dietary changes patient has to make which include stopping the consumption of dairy products and eggs and switching to a hopeful plant based diet  - patient is agreeable to begin the change in her diet to improve her diabetes  - I spoke to the pharmacy to clear patient's medications reviewed her medications and sent to the pharmacy medications she is supposed to be taking  Patient is agreeable to restarting her to oral medications to help control her blood sugar   - patient is also exercising at the gym several times a week  - Refused to speak with our   Will attempt again on next follow up visit

## 2019-11-14 NOTE — PROGRESS NOTES
Chief Complaint   Patient presents with    Diabetes     /76 (BP Location: Left arm, Patient Position: Sitting, Cuff Size: Standard)   Temp 97 9 °F (36 6 °C)   Resp 16   Wt 85 3 kg (188 lb)   BMI 26 98 kg/m²      Assessment/Plan     Hypothyroidism  - TSH reviewed  Will continue levothyroxine at 150mcg QD  Type 2 diabetes mellitus without complication, without long-term current use of insulin ([facility])    Lab Results   Component Value Date    HGBA1C 13 8 (A) 11/14/2019         - hemoglobin A1c has worsened this patient is not taking her medications in 2 months  Insulin therapy was discussed with patient in detail and she continues to refuse it adamantly  - therefore discussed with patient that she has to be compliant with her oral medications which include glipizide 5 mg daily and Janumet  twice a day  - discussed in detail about dietary changes patient has to make which include stopping the consumption of dairy products and eggs and switching to a hopeful plant based diet  - patient is agreeable to begin the change in her diet to improve her diabetes  - I spoke to the pharmacy to clear patient's medications reviewed her medications and sent to the pharmacy medications she is supposed to be taking  Patient is agreeable to restarting her to oral medications to help control her blood sugar   - patient is also exercising at the gym several times a week  HTN, goal below 140/90  Patient's blood pressure is controlled  Will continue spironolactone 100mg  B i d    Will hold lisinopril for now as patient is very confused about the medications she is taking  Considering her blood pressure is controlled and her diabetes control is more important we will hold lisinopril for now until she demonstrates that she is compliant with her medications  Patient denies any side effects with medications  Patient educated on the importance of weight loss, and appropriate dieting     Patient admits to be compliant with medications  Xanthoma  - biopsy confirms the patient has sent though was most likely due to very high triglyceride count  - patient is to continue Crestor which she was not taking and has been sent to the pharmacy for her to take  - also sent Lovaza for patient to take  - extensive counseling on dietary changes  Hypertriglyceridemia  - will obtain lipid panel   - however patient has not been taking her Crestor oral of Rianna as indicated on last visit  These have been recent to the pharmacy today and patient will take them as prescribed  - extensive dietary counseling given to the patient  Need for vaccination  - flu vaccine to be given next visit  Diagnoses and all orders for this visit:    Type 2 diabetes mellitus without complication, without long-term current use of insulin (HCC)  -     POCT hemoglobin A1c    Need for vaccination  -     influenza vaccine, [id number], quadrivalent, recombinant, PF, 0 5 mL, for patients 18 yr+ (FLUBLOK)    Acquired hypothyroidism  -     TSH, 3rd generation; Future    HTN, goal below 140/90  -     Comprehensive metabolic panel; Future    Hypertriglyceridemia  -     Lipid panel; Future       Subjective     Jo Matthew is a 50 y o  adult  Information was obtained from the patient  The language used was [place]  Patient here for follow-up of her chronic conditions  Reports that she feels terrible about her condition and she is willing to do anything at this point to get better  She admits that she has not been compliant with her medications and reports that she feels very sad and depressed about her condition at the moment  Review of Systems   Constitutional: Positive for fatigue and unexpected weight change  Negative for fever  HENT: Negative for congestion and sore throat  Eyes: Negative for visual disturbance  Respiratory: Negative for cough, shortness of breath and wheezing      Cardiovascular: Negative for chest pain, palpitations and leg swelling  Gastrointestinal: Negative for abdominal pain, anal bleeding, constipation, diarrhea, nausea and vomiting  Endocrine: Negative for cold intolerance and heat intolerance  Musculoskeletal: Negative for arthralgias and joint swelling  Skin: Positive for rash  Negative for color change  Neurological: Negative for dizziness, seizures, syncope, weakness and light-headedness  Psychiatric/Behavioral: Negative for agitation, confusion, sleep disturbance and suicidal ideas  Pertinent items are noted in HPI  Social History  - reports that she has been smoking cigarettes  She has been smoking about 0 50 packs per day  She uses smokeless tobacco   - reports that she drank alcohol    - reports that she does not use drugs  Objective     Physical Exam   Constitutional: She is oriented to person, place, and time  She appears well-developed and well-nourished  HENT:   Head: Normocephalic and atraumatic  Eyes: Pupils are equal, round, and reactive to light  Conjunctivae and EOM are normal    Neck: Normal range of motion  Neck supple  No JVD present  Cardiovascular: Normal rate, regular rhythm, normal heart sounds and intact distal pulses  Exam reveals no gallop and no friction rub  No murmur heard  Pulmonary/Chest: Effort normal and breath sounds normal  No respiratory distress  She has no wheezes  Abdominal: Soft  Bowel sounds are normal  She exhibits no distension  There is no tenderness  Musculoskeletal: Normal range of motion  Neurological: She is alert and oriented to person, place, and time  She has normal reflexes  Skin: Skin is warm and dry  Rash noted  Psychiatric: Her behavior is normal  Judgment and thought content normal         Health Information     The following have been reviewed and updated:   She  has a past medical history of Seizure disorder ([address] )    She   Patient Active Problem List    Diagnosis Date Noted    Need for vaccination 11/14/2019    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    Tension headache 04/11/2019    Chronic bronchitis ([facility] [address] ) 01/16/2019    HTN, goal below 140/90 10/15/2018    Tobacco abuse 09/26/2018    Hypertriglyceridemia 08/10/2018    Type 2 diabetes mellitus without complication, without long-term current use of insulin ([facility] ) 08/10/2018    Hypothyroidism 10/15/2014    Adolescent or adult gender identity disorder 09/18/2013     She  has a past surgical history that includes Tonsillectomy  Her family history includes Cancer in her family; Depression in her family; Diabetes in her family; Hypertension in her family; Mental illness in her family; Stroke in her family  She  reports that she has been smoking cigarettes  She has been smoking about 0 50 packs per day  She uses smokeless tobacco  She reports that she drank alcohol  She reports that she does not use drugs  Current Outpatient Medications   Medication Sig Dispense Refill    albuterol (VENTOLIN HFA) 90 mcg/act inhaler Inhale 2 puffs every 4 (four) hours as needed for wheezing 1 Inhaler 1    aspirin (ASPIRIN ADULT LOW STRENGTH) 81 mg EC tablet Take 1 tablet (81 mg total) by mouth daily 30 tablet 3    cholecalciferol (VITAMIN D3) 1,000 units tablet Take 1,000 Units by mouth daily      estradiol valerate (DELESTROGEN) 40 MG/ML injection Inject 1 mL (40 mg total) into a muscle every 14 (fourteen) days 5 mL 4    levothyroxine 150 mcg tablet Take 1 tablet (150 mcg total) by mouth daily 30 tablet 3    omega-3-acid ethyl esters (LOVAZA) 1 g capsule Take 2 capsules (2 g total) by mouth 2 (two) times a day 60 capsule 3    PREMARIN 1 25 MG tablet TAKE 2 TABLETS BY MOUTH TWICE DAILY 360 tablet 1    spironolactone (ALDACTONE) 100 mg tablet TAKE 1 TABLET TWICE DAILY   180 tablet 3    zolpidem (AMBIEN CR) 12 5 MG CR tablet Take 12 5 mg by mouth every evening  2    Alcohol Swabs (ALCOHOL PADS) 70 % PADS by Does not apply route as needed (sick) (Patient not taking: Reported on 6/13/2019) 100 each 10    Blood Glucose Monitoring Suppl ([employer] FLEX SYSTEM) w/Device KIT USE TO TEST BLOOD SUGARS ONCE DAILY  0    Blood Glucose Monitoring Suppl KIT by Does not apply route daily (Patient not taking: Reported on 6/13/2019) 30 each 0    conjugated estrogens (PREMARIN) vaginal cream Insert 1 g into the vagina 2 (two) times a day for 10 days 42 5 g 0    estradiol (ESTRACE VAGINAL) 0 1 mg/g vaginal cream Insert into the vagina      estradiol valerate (DELESTROGEN) 40 MG/ML injection Inject 0 5 mL (20 mg total) into a muscle every 14 (fourteen) days 5 mL 3    glipiZIDE (GLUCOTROL XL) 5 mg 24 hr tablet Take 1 tablet (5 mg total) by mouth daily 30 tablet 3    glucose blood test strip Please check blood glucose three times daily (Patient not taking: Reported on 6/13/2019) 100 each 3    Lancets (FREESTYLE) lancets Use as instructed (Patient not taking: Reported on 6/13/2019) 100 each 10    rosuvastatin (CRESTOR) 20 MG tablet Take 1 tablet (20 mg total) by mouth daily 30 tablet 3    SAFETY-LUCIANO 3CC SYR 21GX1 5" 21G X 1-1/2" 3 ML MISC USE AS DIRECTED 30 each 0    sitaGLIPtin-metFORMIN (JANUMET)  MG per tablet Take 1 tablet by mouth 2 (two) times a day with meals 60 tablet 3    SYRINGE-NEEDLE, DISP, 3 ML (LUER LOCK SAFETY SYRINGES) 22G X 1-1/2" 3 ML MISC by Does not apply route every 14 (fourteen) days 20 each 3     No current facility-administered medications for this visit          Allergies   Allergen Reactions    Bee Venom Anaphylaxis    Penicillins Seizures    Pineapple Anaphylaxis    Keflex [Cephalexin] GI Intolerance    Latex Hives    Other        Current Outpatient Medications:     albuterol (VENTOLIN HFA) 90 mcg/act inhaler, Inhale 2 puffs every 4 (four) hours as needed for wheezing, Disp: 1 Inhaler, Rfl: 1    ASPIRIN ADULT LOW STRENGTH 81 MG EC tablet, TAKE 1 TABLET BY MOUTH EVERY DAY, Disp: 90 tablet, Rfl: 0    cetirizine (ZyrTEC) 10 MG chewable tablet, Chew 1 tablet (10 mg total) daily, Disp: 30 tablet, Rfl: 3    cholecalciferol (VITAMIN D3) 1,000 units tablet, Take 1,000 Units by mouth daily, Disp: , Rfl:     estradiol valerate (DELESTROGEN) 40 MG/ML injection, Inject 1 mL (40 mg total) into a muscle every 14 (fourteen) days, Disp: 5 mL, Rfl: 4    fluticasone (FLONASE) 50 mcg/act nasal spray, USE 1 SPRAY INTO EACH NOSTRIL EVERY DAY, Disp: 1 Bottle, Rfl: 0    levothyroxine 150 mcg tablet, TAKE 1 TABLET BY MOUTH EVERY DAY, Disp: 90 tablet, Rfl: 1    montelukast (SINGULAIR) 5 mg chewable tablet, Chew 1 tablet (5 mg total) daily at bedtime, Disp: 30 tablet, Rfl: 2    multivitamin (THERAGRAN) TABS, Take 1 tablet by mouth daily, Disp: , Rfl:     omega-3-acid ethyl esters (LOVAZA) 1 g capsule, Take 2 capsules (2 g total) by mouth 2 (two) times a day, Disp: 60 capsule, Rfl: 3    PREMARIN 1 25 MG tablet, TAKE 2 TABLETS BY MOUTH TWICE DAILY, Disp: 360 tablet, Rfl: 1    spironolactone (ALDACTONE) 100 mg tablet, TAKE 1 TABLET TWICE DAILY  , Disp: 180 tablet, Rfl: 3    zolpidem (AMBIEN CR) 12 5 MG CR tablet, Take 12 5 mg by mouth every evening, Disp: , Rfl: 2    Alcohol Swabs (ALCOHOL PADS) 70 % PADS, by Does not apply route as needed (sick) (Patient not taking: Reported on 6/13/2019), Disp: 100 each, Rfl: 10    Blood Glucose Monitoring Suppl (ONETOUCH [employer]IO FLEX SYSTEM) w/Device KIT, USE TO TEST BLOOD SUGARS ONCE DAILY, Disp: , Rfl: 0    Blood Glucose Monitoring Suppl KIT, by Does not apply route daily (Patient not taking: Reported on 6/13/2019), Disp: 30 each, Rfl: 0    Blood Pressure KIT, by Does not apply route daily (Patient not taking: Reported on 6/13/2019), Disp: 1 each, Rfl: 0    COLLAGEN PO, Take by mouth daily at bedtime, Disp: , Rfl:     conjugated estrogens (PREMARIN) vaginal cream, Insert 1 g into the vagina 2 (two) times a day for 10 days, Disp: 42 5 g, Rfl: 0    cyanocobalamin 500 MCG tablet, Take 500 mcg by mouth daily, Disp: , Rfl:     estradiol (ESTRACE VAGINAL) 0 1 mg/g vaginal cream, Insert into the vagina, Disp: , Rfl:     estradiol (ESTRACE) 0 1 mg/g vaginal cream, Apply twice a day to area described for 2 weeks, Disp: 42 5 g, Rfl: 0    estradiol valerate (DELESTROGEN) 40 MG/ML injection, Inject 0 5 mL (20 mg total) into a muscle every 14 (fourteen) days, Disp: 5 mL, Rfl: 3    glipiZIDE (GLUCOTROL XL) 5 mg 24 hr tablet, TAKE 1 TABLET BY MOUTH EVERY DAY, Disp: 30 tablet, Rfl: 0    glucose blood test strip, Please check blood glucose three times daily (Patient not taking: Reported on 6/13/2019), Disp: 100 each, Rfl: 3    JANUMET  MG per tablet, TAKE 1 TABLET BY MOUTH TWICE A DAY WITH MEALS, Disp: 60 tablet, Rfl: 3    Lancets (FREESTYLE) lancets, Use as instructed (Patient not taking: Reported on 6/13/2019), Disp: 100 each, Rfl: 10    lisinopril (ZESTRIL) 2 5 mg tablet, Take 1 tablet (2 5 mg total) by mouth daily, Disp: 90 tablet, Rfl: 2    phentermine (ADIPEX-P) 37 5 MG capsule, Take 1 capsule (37 5 mg total) by mouth every morning (Patient not taking: Reported on 6/13/2019), Disp: 30 capsule, Rfl: 0    rosuvastatin (CRESTOR) 20 MG tablet, Take 1 tablet (20 mg total) by mouth daily (Patient not taking: Reported on 11/14/2019), Disp: 30 tablet, Rfl: 3    SAFETY-LUCIANO 3CC SYR 21GX1 5" 21G X 1-1/2" 3 ML MISC, USE AS DIRECTED, Disp: 30 each, Rfl: 0    SYRINGE-NEEDLE, DISP, 3 ML (LUER LOCK SAFETY SYRINGES) 22G X 1-1/2" 3 ML MISC, by Does not apply route every 14 (fourteen) days, Disp: 20 each, Rfl: 3    Patient Active Problem List   Diagnosis    Hypothyroidism    Adolescent or adult gender identity disorder    Hypertriglyceridemia    Type 2 diabetes mellitus without complication, without long-term current use of insulin (HCC)    Tobacco abuse    HTN, goal below 140/90    Chronic bronchitis (HCC)    Tension headache    Rash and nonspecific skin eruption    Need for vaccination     Past Surgical History:   Procedure Laterality Date    TONSILLECTOMY       Family History   Problem Relation Age of Onset    Cancer Family         Unknown    Stroke Family     Depression Family     Diabetes Family         Mellitus    Hypertension Family     Mental illness Family      Health Maintenance   Topic Date Due    MAMMOGRAM  1970    Diabetic Foot Exam  12/25/1980    DM Eye Exam  12/25/1980    BMI: Followup Plan  12/25/1988    Cervical Cancer Screening  12/25/1991    INFLUENZA VACCINE  07/01/2019    Depression Screening PHQ  08/31/2019    HEMOGLOBIN A1C  12/13/2019    URINE MICROALBUMIN  01/24/2020    BMI: Adult  09/06/2020    DTaP,Tdap,and Td Vaccines (2 - Td) 12/10/2023    Pneumococcal Vaccine: 65+ Years (1 of 2 - PCV13) 12/25/2035    HIV Screening  Completed    Hepatitis C Screening  Completed    Pneumococcal Vaccine: Pediatrics (0 to 5 Years) and At-Risk Patients (6 to 59 Years)  Completed    HEPATITIS B VACCINES  Completed

## 2019-11-14 NOTE — ASSESSMENT & PLAN NOTE
- biopsy confirms the patient has sent though was most likely due to very high triglyceride count  - patient is to continue Crestor which she was not taking and has been sent to the pharmacy for her to take  - also sent Lovaza for patient to take  - extensive counseling on dietary changes

## 2019-11-14 NOTE — PATIENT INSTRUCTIONS
Lifestyle Medicine Tip Sheet    1  Eat predominantly less processed foods such as fast food, T V  dinners, and greene  2  Eat Close to [employer], [employer] or [email or web address]    3  Eat a predominantly plant based diet   a  Dark Leafy Greens  b  Fruits/Vegetables  c  Whole Grains: Whole wheat, barely, wheat berries, quinoa, steel cut oats, brown rice, whole wheat pasta  d  Legumes: kidney beans, alejo beans, white beans, black beans, garbanzo beans (chickpeas), lima beans (mature, dried), split peas, lentils, and edamame (green soybeans)      4  At least half of the plate should contain fruits or vegetables        5  Liquid should be predominantly water  (limit soda and juice)    6  Watch portion size  7  Foods you should avoid or limit?  - Fats - Specifically saturated and trans-fats  They are found in margarines, many fast foods, and some store-bought baked goods  Saturated and Trans-fats can raise your cholesterol level and your chance of getting heart disease    - When you cook, it's best to use no oils but if needed try to limit the amount of oil used as oil contains many calories per volume and is very unhealthy when heated during cooking    - Sugar -Limit or avoid sugar, sweets, and refined grains  Refined grains are found in white bread, white rice, most forms of pasta, and most packaged "snack" foods    - Try not to cook with salt and avoid  adding extra salt to your  meals   - Meat - Studies have shown that eating a lot of red meat and poultry can increase your risk of certain health problems, including heart disease, diabetes, obesity and cancer  So try to limit the intake of it  8  Practice good sleep hygiene by getting 7-9 hours of sleep a night    9  Daily exercise minimum of 30 minutes (walking around the block)    10  Socialization (friends and family)   - Explore your neighborhood   Go to the park, spend time at [employer]  - Consider taking a class or volunteering to connect with new people    If you are interested you can read more about healthy food choices at the following websites:  a  [employer]  org  b  Home cooking recipes: https://www thinkingphones/  c  http://melodie info/  d  Familydoctor  org

## 2019-11-15 ENCOUNTER — TELEPHONE (OUTPATIENT)
Dept: FAMILY MEDICINE CLINIC | Facility: CLINIC | Age: 49
End: 2019-11-15

## 2019-11-15 NOTE — TELEPHONE ENCOUNTER
LM for pt to call me back to confirm the name of the meter she has at home      Pharmacy states they dispensed the [facility]

## 2019-11-21 DIAGNOSIS — Z78.9 TRANSGENDER: ICD-10-CM

## 2019-11-21 DIAGNOSIS — E03.8 OTHER SPECIFIED HYPOTHYROIDISM: ICD-10-CM

## 2019-11-21 RX ORDER — LEVOTHYROXINE SODIUM 0.15 MG/1
TABLET ORAL
Qty: 90 TABLET | Refills: 1 | OUTPATIENT
Start: 2019-11-21

## 2019-11-21 RX ORDER — ESTROGENS, CONJUGATED 1.25 MG
TABLET ORAL
Qty: 360 TABLET | Refills: 1 | Status: SHIPPED | OUTPATIENT
Start: 2019-11-21 | End: 2021-03-05 | Stop reason: SINTOL

## 2019-11-27 ENCOUNTER — TELEPHONE (OUTPATIENT)
Dept: FAMILY MEDICINE CLINIC | Facility: CLINIC | Age: 49
End: 2019-11-27

## 2019-11-27 NOTE — TELEPHONE ENCOUNTER
Pharmacy states Janumet and Omega 3 require PA  Called pharmacy, got the # for PA  Called insurance  They are faxing over PA form for Janumet  Their only step therapy requirement is that pt has tried Metformin, which she has  Omega 3 will have to wait until she has her Lipid panel drawn because they'll only cover it if her triglycerides are over 500  Otherwise, pt can buy them OTC

## 2019-11-29 ENCOUNTER — TELEPHONE (OUTPATIENT)
Dept: FAMILY MEDICINE CLINIC | Facility: CLINIC | Age: 49
End: 2019-11-29

## 2019-12-03 NOTE — TELEPHONE ENCOUNTER
Did you receive this? I see a message about a form but there's no copy of it on the chart for me to verify  It doesn't say it was a prior Binta Dom

## 2019-12-12 ENCOUNTER — OFFICE VISIT (OUTPATIENT)
Dept: FAMILY MEDICINE CLINIC | Facility: CLINIC | Age: 49
End: 2019-12-12

## 2019-12-12 VITALS
TEMPERATURE: 97.5 F | BODY MASS INDEX: 26.77 KG/M2 | WEIGHT: 187 LBS | DIASTOLIC BLOOD PRESSURE: 74 MMHG | SYSTOLIC BLOOD PRESSURE: 114 MMHG | RESPIRATION RATE: 18 BRPM | HEART RATE: 102 BPM | OXYGEN SATURATION: 96 % | HEIGHT: 70 IN

## 2019-12-12 DIAGNOSIS — E11.9 TYPE 2 DIABETES MELLITUS WITHOUT COMPLICATION, WITHOUT LONG-TERM CURRENT USE OF INSULIN (HCC): Primary | ICD-10-CM

## 2019-12-12 DIAGNOSIS — M25.561 CHRONIC PAIN OF BOTH KNEES: ICD-10-CM

## 2019-12-12 DIAGNOSIS — Z71.3 NUTRITIONAL COUNSELING: ICD-10-CM

## 2019-12-12 DIAGNOSIS — M25.562 CHRONIC PAIN OF BOTH KNEES: ICD-10-CM

## 2019-12-12 DIAGNOSIS — G89.29 CHRONIC PAIN OF BOTH KNEES: ICD-10-CM

## 2019-12-12 PROBLEM — Z23 NEED FOR VACCINATION: Status: RESOLVED | Noted: 2019-11-14 | Resolved: 2019-12-12

## 2019-12-12 PROBLEM — G44.209 TENSION HEADACHE: Status: RESOLVED | Noted: 2019-04-11 | Resolved: 2019-12-12

## 2019-12-12 PROCEDURE — 3008F BODY MASS INDEX DOCD: CPT | Performed by: FAMILY MEDICINE

## 2019-12-12 PROCEDURE — 99214 OFFICE O/P EST MOD 30 MIN: CPT | Performed by: FAMILY MEDICINE

## 2019-12-12 RX ORDER — TRAMADOL HYDROCHLORIDE 50 MG/1
50 TABLET ORAL EVERY 8 HOURS PRN
Qty: 20 TABLET | Refills: 0 | Status: SHIPPED | OUTPATIENT
Start: 2019-12-12 | End: 2020-01-29 | Stop reason: SDUPTHER

## 2019-12-12 NOTE — ASSESSMENT & PLAN NOTE
Lab Results   Component Value Date    HGBA1C 13 8 (A) 11/14/2019       - continue glipizide 5 mg daily and metformin 500mg BID  Will try and send for januvia 50mg QD and see it it gets approved     - continue plant based diet

## 2019-12-12 NOTE — ASSESSMENT & PLAN NOTE
- has been taking NSAIDs but with no relief  - will send tramadol 50 mg to take only when pain is severe  - may need further workup in the future as such as x-rays and possible corticosteroid injection

## 2019-12-12 NOTE — PROGRESS NOTES
Chief Complaint   Patient presents with    Diabetes     pt here for DM follow up     /74 (BP Location: Left arm, Patient Position: Sitting, Cuff Size: Standard)   Pulse 102   Temp 97 5 °F (36 4 °C) (Temporal)   Resp 18   Ht 5' 10" (1 778 m)   Wt 84 8 kg (187 lb)   SpO2 96%   BMI 26 83 kg/m²      Assessment/Plan     Type 2 diabetes mellitus without complication, without long-term current use of insulin ([facility])    Lab Results   Component Value Date    HGBA1C 13 8 (A) 11/14/2019       - continue glipizide 5 mg daily and metformin 500mg BID  Will try and send for januvia 50mg QD and see it it gets approved  - continue plant based diet      Chronic pain of both knees  - has been taking NSAIDs but with no relief  - will send tramadol 50 mg to take only when pain is severe  - may need further workup in the future as such as x-rays and possible corticosteroid injection  Nutritional counseling  - discussed in detail with patient to eat predominantly a plant based diet which includes grains, greens and beans  - elimination of dairy products  - discuss the differences between carbohydrate and which of these she should be consuming   - discussed the importance of increasing fiber intake  - discussed the importance of reducing animal products such as poultry and red meat  Diagnoses and all orders for this visit:    Type 2 diabetes mellitus without complication, without long-term current use of insulin ([facility])  -     metFORMIN (GLUCOPHAGE) 500 mg tablet; Take 1 tablet (500 mg total) by mouth 2 (two) times a day with meals  -     sitaGLIPtin (JANUVIA) 50 mg tablet; Take 1 tablet (50 mg total) by mouth daily    Nutritional counseling    Chronic pain of both knees  -     traMADol (ULTRAM) 50 mg tablet; Take 1 tablet (50 mg total) by mouth every 8 (eight) hours as needed for severe pain       Subjective     Jo Matthew is a 50 y o  adult  Information was obtained from the patient   The language used was English  Here for nutritional counseling and follow-up for diabetes  Compliant with all of her medications but has yet to receive Janumet  Otherwise, she reports chronic bilateral knee pain that has not been responsive to over-the-counter NSAIDs of which she should not be taking due to her other medical conditions  No other complaints  Review of Systems   Constitutional: Negative for fatigue and fever  HENT: Negative for congestion and sore throat  Eyes: Negative for visual disturbance  Respiratory: Negative for cough, shortness of breath and wheezing  Cardiovascular: Negative for chest pain, palpitations and leg swelling  Gastrointestinal: Negative for abdominal pain, anal bleeding, constipation, diarrhea, nausea and vomiting  Endocrine: Negative for cold intolerance and heat intolerance  Musculoskeletal: Positive for arthralgias  Negative for joint swelling  Skin: Negative for color change  Neurological: Negative for dizziness, seizures, syncope, weakness and light-headedness  Psychiatric/Behavioral: Negative for agitation, confusion, sleep disturbance and suicidal ideas  Pertinent items are noted in HPI  Social History  - reports that she has been smoking cigarettes  She has been smoking about 0 50 packs per day  She uses smokeless tobacco   - reports that she drank alcohol    - reports that she does not use drugs  Objective     Physical Exam   Constitutional: She is oriented to person, place, and time  She appears well-developed and well-nourished  HENT:   Head: Normocephalic and atraumatic  Eyes: Pupils are equal, round, and reactive to light  Conjunctivae and EOM are normal    Neck: Normal range of motion  Neck supple  No JVD present  Cardiovascular: Normal rate, regular rhythm, normal heart sounds and intact distal pulses  Exam reveals no gallop and no friction rub  No murmur heard    Pulmonary/Chest: Effort normal and breath sounds normal  No respiratory distress  She has no wheezes  Abdominal: Soft  Bowel sounds are normal  She exhibits no distension  There is no tenderness  Musculoskeletal: Normal range of motion  Neurological: She is alert and oriented to person, place, and time  She has normal reflexes  Skin: Skin is warm and dry  Psychiatric: Her behavior is normal  Judgment and thought content normal         Health Information     The following have been reviewed and updated:   She  has a past medical history of Seizure disorder ([facility] )  She   Patient Active Problem List    Diagnosis Date Noted    Nutritional counseling 12/12/2019    Chronic pain of both knees 12/12/2019    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    Chronic bronchitis ([facility] ) 01/16/2019    HTN, goal below 140/90 10/15/2018    Tobacco abuse 09/26/2018    Hypertriglyceridemia 08/10/2018    Type 2 diabetes mellitus without complication, without long-term current use of insulin ([place] [id number] ) 08/10/2018    Hypothyroidism 10/15/2014    Adolescent or adult gender identity disorder 09/18/2013     She  has a past surgical history that includes Tonsillectomy  Her family history includes Cancer in her family; Depression in her family; Diabetes in her family; Hypertension in her family; Mental illness in her family; Stroke in her family  She  reports that she has been smoking cigarettes  She has been smoking about 0 50 packs per day  She uses smokeless tobacco  She reports that she drank alcohol  She reports that she does not use drugs    Current Outpatient Medications   Medication Sig Dispense Refill    aspirin (ASPIRIN ADULT LOW STRENGTH) 81 mg EC tablet Take 1 tablet (81 mg total) by mouth daily 30 tablet 3    Blood Glucose Monitoring Suppl (ONETOUCH VERIO FLEX SYSTEM) w/Device KIT USE TO TEST BLOOD SUGARS ONCE DAILY  0    cholecalciferol (VITAMIN D3) 1,000 units tablet Take 1,000 Units by mouth daily      conjugated estrogens (PREMARIN) vaginal cream Insert 1 g into the vagina 2 (two) times a day for 10 days 42 5 g 0    estradiol (ESTRACE VAGINAL) 0 1 mg/g vaginal cream Insert into the vagina      estradiol valerate (DELESTROGEN) 40 MG/ML injection Inject 0 5 mL (20 mg total) into a muscle every 14 (fourteen) days 5 mL 3    estradiol valerate (DELESTROGEN) 40 MG/ML injection Inject 1 mL (40 mg total) into a muscle every 14 (fourteen) days 5 mL 4    glipiZIDE (GLUCOTROL XL) 5 mg 24 hr tablet Take 1 tablet (5 mg total) by mouth daily 30 tablet 3    levothyroxine 150 mcg tablet Take 1 tablet (150 mcg total) by mouth daily 30 tablet 3    metFORMIN (GLUCOPHAGE) 500 mg tablet Take 1 tablet (500 mg total) by mouth 2 (two) times a day with meals 60 tablet 5    omega-3-acid ethyl esters (LOVAZA) 1 g capsule Take 2 capsules (2 g total) by mouth 2 (two) times a day 60 capsule 3    PREMARIN 1 25 MG tablet TAKE 2 TABLETS BY MOUTH TWICE DAILY 360 tablet 1    rosuvastatin (CRESTOR) 20 MG tablet Take 1 tablet (20 mg total) by mouth daily 30 tablet 3    SAFETY-LUCIANO 3CC SYR 21GX1 5" 21G X 1-1/2" 3 ML MISC USE AS DIRECTED 30 each 0    sitaGLIPtin (JANUVIA) 50 mg tablet Take 1 tablet (50 mg total) by mouth daily 30 tablet 2    spironolactone (ALDACTONE) 100 mg tablet TAKE 1 TABLET TWICE DAILY  180 tablet 3    SYRINGE-NEEDLE, DISP, 3 ML (LUER LOCK SAFETY SYRINGES) 22G X 1-1/2" 3 ML MISC by Does not apply route every 14 (fourteen) days 20 each 3    traMADol (ULTRAM) 50 mg tablet Take 1 tablet (50 mg total) by mouth every 8 (eight) hours as needed for severe pain 20 tablet 0    zolpidem (AMBIEN CR) 12 5 MG CR tablet Take 12 5 mg by mouth every evening  2     No current facility-administered medications for this visit          Allergies   Allergen Reactions    Bee Venom Anaphylaxis    Penicillins Seizures    Pineapple Anaphylaxis    Keflex [Cephalexin] GI Intolerance    Latex Hives    Other        Current Outpatient Medications:     aspirin (ASPIRIN ADULT LOW STRENGTH) 81 mg EC tablet, Take 1 tablet (81 mg total) by mouth daily, Disp: 30 tablet, Rfl: 3    Blood Glucose Monitoring Suppl (ONETOUCH VERIO FLEX SYSTEM) w/Device KIT, USE TO TEST BLOOD SUGARS ONCE DAILY, Disp: , Rfl: 0    cholecalciferol (VITAMIN D3) 1,000 units tablet, Take 1,000 Units by mouth daily, Disp: , Rfl:     conjugated estrogens (PREMARIN) vaginal cream, Insert 1 g into the vagina 2 (two) times a day for 10 days, Disp: 42 5 g, Rfl: 0    estradiol (ESTRACE VAGINAL) 0 1 mg/g vaginal cream, Insert into the vagina, Disp: , Rfl:     estradiol valerate (DELESTROGEN) 40 MG/ML injection, Inject 0 5 mL (20 mg total) into a muscle every 14 (fourteen) days, Disp: 5 mL, Rfl: 3    estradiol valerate (DELESTROGEN) 40 MG/ML injection, Inject 1 mL (40 mg total) into a muscle every 14 (fourteen) days, Disp: 5 mL, Rfl: 4    glipiZIDE (GLUCOTROL XL) 5 mg 24 hr tablet, Take 1 tablet (5 mg total) by mouth daily, Disp: 30 tablet, Rfl: 3    levothyroxine 150 mcg tablet, Take 1 tablet (150 mcg total) by mouth daily, Disp: 30 tablet, Rfl: 3    metFORMIN (GLUCOPHAGE) 500 mg tablet, Take 1 tablet (500 mg total) by mouth 2 (two) times a day with meals, Disp: 60 tablet, Rfl: 5    omega-3-acid ethyl esters (LOVAZA) 1 g capsule, Take 2 capsules (2 g total) by mouth 2 (two) times a day, Disp: 60 capsule, Rfl: 3    PREMARIN 1 25 MG tablet, TAKE 2 TABLETS BY MOUTH TWICE DAILY, Disp: 360 tablet, Rfl: 1    rosuvastatin (CRESTOR) 20 MG tablet, Take 1 tablet (20 mg total) by mouth daily, Disp: 30 tablet, Rfl: 3    SAFETY-LUCIANO 3CC SYR 21GX1 5" 21G X 1-1/2" 3 ML MISC, USE AS DIRECTED, Disp: 30 each, Rfl: 0    sitaGLIPtin (JANUVIA) 50 mg tablet, Take 1 tablet (50 mg total) by mouth daily, Disp: 30 tablet, Rfl: 2    spironolactone (ALDACTONE) 100 mg tablet, TAKE 1 TABLET TWICE DAILY  , Disp: 180 tablet, Rfl: 3    SYRINGE-NEEDLE, DISP, 3 ML (LUER LOCK SAFETY SYRINGES) 22G X 1-1/2" 3 ML MISC, by Does not apply route every 14 (fourteen) days, Disp: 20 each, Rfl: 3    traMADol (ULTRAM) 50 mg tablet, Take 1 tablet (50 mg total) by mouth every 8 (eight) hours as needed for severe pain, Disp: 20 tablet, Rfl: 0    zolpidem (AMBIEN CR) 12 5 MG CR tablet, Take 12 5 mg by mouth every evening, Disp: , Rfl: 2    Patient Active Problem List   Diagnosis    Hypothyroidism    Adolescent or adult gender identity disorder    Hypertriglyceridemia    Type 2 diabetes mellitus without complication, without long-term current use of insulin ([facility])    Tobacco abuse    HTN, goal below 140/90    Chronic bronchitis ([facility]CC)    Xanthoma    Nutritional counseling    Chronic pain of both knees     Past Surgical History:   Procedure Laterality Date    TONSILLECTOMY       Family History   Problem Relation Age of Onset    Cancer Family         Unknown    Stroke Family     Depression Family     Diabetes Family         Mellitus    Hypertension Family     Mental illness Family      Health Maintenance   Topic Date Due    MAMMOGRAM  1970    Diabetic Foot Exam  12/25/1980    DM Eye Exam  12/25/1980    BMI: Followup Plan  12/25/1988    Cervical Cancer Screening  12/25/1991    Influenza Vaccine  07/01/2019    Depression Screening PHQ  08/31/2019    URINE MICROALBUMIN  01/24/2020    HEMOGLOBIN A1C  05/14/2020    BMI: Adult  11/14/2020    DTaP,Tdap,and Td Vaccines (2 - Td) 12/10/2023    Pneumococcal Vaccine: 65+ Years (1 of 2 - PCV13) 12/25/2035    HIV Screening  Completed    Hepatitis C Screening  Completed    Pneumococcal Vaccine: Pediatrics (0 to 5 Years) and At-Risk Patients (6 to 59 Years)  Completed    Hepatitis B Vaccine  Completed    HIB Vaccine  Aged Out    IPV Vaccine  Aged Out    Hepatitis A Vaccine  Aged Out    Meningococcal ACWY Vaccine  Aged Out    HPV Vaccine  Aged Out          BMI Counseling: Body mass index is 26 83 kg/m²   The BMI is above normal  Nutrition recommendations include decreasing overall calorie intake, 3-5 servings of fruits/vegetables daily, reducing fast food intake, consuming healthier snacks and decreasing soda and/or juice intake

## 2019-12-12 NOTE — PATIENT INSTRUCTIONS
Movies to watch:     [place] over [employer] the health   [employer] game changers    Continue to eat greens, grains and beans

## 2019-12-12 NOTE — ASSESSMENT & PLAN NOTE
- discussed in detail with patient to eat predominantly a plant based diet which includes grains, greens and beans  - elimination of dairy products  - discuss the differences between carbohydrate and which of these she should be consuming   - discussed the importance of increasing fiber intake  - discussed the importance of reducing animal products such as poultry and red meat

## 2019-12-16 ENCOUNTER — TELEPHONE (OUTPATIENT)
Dept: FAMILY MEDICINE CLINIC | Facility: CLINIC | Age: 49
End: 2019-12-16

## 2019-12-16 NOTE — TELEPHONE ENCOUNTER
You placed a 1 month f/u, ok to db for this patient but even your DB slots are taken as of now and patient was very insistent  How would you like to proceed?

## 2020-01-22 DIAGNOSIS — Z78.9 TRANSGENDER: ICD-10-CM

## 2020-01-29 ENCOUNTER — OFFICE VISIT (OUTPATIENT)
Dept: FAMILY MEDICINE CLINIC | Facility: CLINIC | Age: 50
End: 2020-01-29

## 2020-01-29 VITALS
DIASTOLIC BLOOD PRESSURE: 62 MMHG | SYSTOLIC BLOOD PRESSURE: 140 MMHG | HEART RATE: 60 BPM | WEIGHT: 189 LBS | HEIGHT: 70 IN | TEMPERATURE: 99 F | BODY MASS INDEX: 27.06 KG/M2 | RESPIRATION RATE: 18 BRPM

## 2020-01-29 DIAGNOSIS — G89.29 CHRONIC PAIN OF BOTH KNEES: ICD-10-CM

## 2020-01-29 DIAGNOSIS — M25.562 CHRONIC PAIN OF BOTH KNEES: ICD-10-CM

## 2020-01-29 DIAGNOSIS — I10 HTN, GOAL BELOW 140/90: ICD-10-CM

## 2020-01-29 DIAGNOSIS — Z23 NEED FOR VACCINATION: ICD-10-CM

## 2020-01-29 DIAGNOSIS — E03.9 ACQUIRED HYPOTHYROIDISM: ICD-10-CM

## 2020-01-29 DIAGNOSIS — E11.9 TYPE 2 DIABETES MELLITUS WITHOUT COMPLICATION, WITHOUT LONG-TERM CURRENT USE OF INSULIN (HCC): Primary | ICD-10-CM

## 2020-01-29 DIAGNOSIS — M25.561 CHRONIC PAIN OF BOTH KNEES: ICD-10-CM

## 2020-01-29 DIAGNOSIS — E78.1 HYPERTRIGLYCERIDEMIA: ICD-10-CM

## 2020-01-29 DIAGNOSIS — Z12.39 BREAST CANCER SCREENING: ICD-10-CM

## 2020-01-29 LAB
CREAT UR-MCNC: 61.2 MG/DL
MICROALBUMIN UR-MCNC: 185 MG/L (ref 0–20)
MICROALBUMIN/CREAT 24H UR: 302 MG/G CREATININE (ref 0–30)

## 2020-01-29 PROCEDURE — 82043 UR ALBUMIN QUANTITATIVE: CPT | Performed by: FAMILY MEDICINE

## 2020-01-29 PROCEDURE — 3008F BODY MASS INDEX DOCD: CPT | Performed by: FAMILY MEDICINE

## 2020-01-29 PROCEDURE — 3062F POS MACROALBUMINURIA REV: CPT | Performed by: FAMILY MEDICINE

## 2020-01-29 PROCEDURE — 82570 ASSAY OF URINE CREATININE: CPT | Performed by: FAMILY MEDICINE

## 2020-01-29 PROCEDURE — 99214 OFFICE O/P EST MOD 30 MIN: CPT | Performed by: FAMILY MEDICINE

## 2020-01-29 RX ORDER — TRAMADOL HYDROCHLORIDE 50 MG/1
50 TABLET ORAL DAILY PRN
Qty: 30 TABLET | Refills: 0 | Status: SHIPPED | OUTPATIENT
Start: 2020-01-29 | End: 2021-01-27 | Stop reason: SDUPTHER

## 2020-01-29 NOTE — PROGRESS NOTES
Chief Complaint   Patient presents with    Diabetes     follow up     Knee Pain     pt does c/o B/L knee pain      /62 (BP Location: Left arm, Patient Position: Sitting, Cuff Size: Standard)   Pulse 60   Temp 99 °F (37 2 °C) (Temporal)   Resp 18   Ht 5' 10" (1 778 m)   Wt 85 7 kg (189 lb)   LMP  (LMP Unknown)   Breastfeeding No   BMI 27 12 kg/m²      Assessment/Plan     Hypothyroidism  - TSH reviewed  Will continue levothyroxine at 150mcg QD  Type 2 diabetes mellitus without complication, without long-term current use of insulin ([facility])    Lab Results   Component Value Date    HGBA1C 13 8 (A) 11/14/2019     - continue glipizide 5 mg daily and metformin 500mg BID  Will try and send for januvia 50mg QD and see it it gets approved  - continue plant based diet      HTN, goal below 140/90  Patient's blood pressure is controlled  Will continue spironolactone 100mg  B i d    Will hold lisinopril for now as patient is very confused about the medications she is taking  Considering her blood pressure is controlled and her diabetes control is more important we will hold lisinopril for now until she demonstrates that she is compliant with her medications  Patient denies any side effects with medications  Patient educated on the importance of weight loss, and appropriate dieting  Patient admits to be compliant with medications  Hypertriglyceridemia  - will obtain lipid panel   - extensive dietary counseling given to the patient  Chronic pain of both knees  - has been taking NSAIDs but with no relief  - will send tramadol 50 mg to take only when pain is severe    - will obtain bilateral x-rays of knees  - patient may need corticosteroid injection        Diagnoses and all orders for this visit:    Type 2 diabetes mellitus without complication, without long-term current use of insulin ([facility] )  -     Microalbumin / creatinine urine ratio    Breast cancer screening    Need for vaccination  - FLUZONE: influenza vaccine, quadrivalent, 0 5 mL    Other orders  -     Cancel: POCT hemoglobin A1c  -     Cancel: Mammo screening bilateral w cad       Subjective     Jo Matthew is a 52 y o  adult  Information was obtained from the patient  The language used was [place]  Patient reports she is doing well compliant with her medications and her dietary lifestyle modifications  Still states that her knees do hurt but are relieved with the use of tramadol  Otherwise, no other complaints at the moment  Review of Systems   Constitutional: Negative for fatigue and fever  HENT: Negative for congestion and sore throat  Eyes: Negative for visual disturbance  Respiratory: Negative for cough, shortness of breath and wheezing  Cardiovascular: Negative for chest pain, palpitations and leg swelling  Gastrointestinal: Negative for abdominal pain, anal bleeding, constipation, diarrhea, nausea and vomiting  Endocrine: Negative for cold intolerance and heat intolerance  Musculoskeletal: Positive for arthralgias  Negative for joint swelling  Skin: Negative for color change  Neurological: Negative for dizziness, seizures, syncope, weakness and light-headedness  Psychiatric/Behavioral: Negative for agitation, confusion, sleep disturbance and suicidal ideas  Pertinent items are noted in HPI  Social History  - reports that she has been smoking cigarettes  She has been smoking about 0 50 packs per day  She uses smokeless tobacco   - reports that she drank alcohol    - reports that she does not use drugs  Objective     Physical Exam   Constitutional: She is oriented to person, place, and time  She appears well-developed and well-nourished  HENT:   Head: Normocephalic and atraumatic  Eyes: Pupils are equal, round, and reactive to light  Conjunctivae and EOM are normal    Neck: Normal range of motion  Neck supple  No JVD present     Cardiovascular: Normal rate, regular rhythm, normal heart sounds and intact distal pulses  Exam reveals no gallop and no friction rub  No murmur heard  Pulmonary/Chest: Effort normal and breath sounds normal  No respiratory distress  She has no wheezes  Abdominal: Soft  Bowel sounds are normal  She exhibits no distension  There is no tenderness  Musculoskeletal: Normal range of motion  Right knee: She exhibits normal range of motion, no swelling, no effusion, no ecchymosis, no deformity, no laceration and no erythema  No tenderness found  Left knee: She exhibits normal range of motion, no swelling, no effusion, no ecchymosis, no deformity and no laceration  No tenderness found  Neurological: She is alert and oriented to person, place, and time  She has normal reflexes  Skin: Skin is warm and dry  Psychiatric: Her behavior is normal  Judgment and thought content normal         Health Information     The following have been reviewed and updated:   She  has a past medical history of Seizure disorder ([facility] )  She   Patient Active Problem List    Diagnosis Date Noted    Nutritional counseling 12/12/2019    Chronic pain of both knees 12/12/2019    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    Chronic bronchitis ([facility] ) 01/16/2019    HTN, goal below 140/90 10/15/2018    Tobacco abuse 09/26/2018    Hypertriglyceridemia 08/10/2018    Type 2 diabetes mellitus without complication, without long-term current use of insulin ([facility] ) 08/10/2018    Hypothyroidism 10/15/2014    Adolescent or adult gender identity disorder 09/18/2013     She  has a past surgical history that includes Tonsillectomy  Her family history includes Cancer in her family; Depression in her family; Diabetes in her family; Hypertension in her family; Mental illness in her family; Stroke in her family  She  reports that she has been smoking cigarettes  She has been smoking about 0 50 packs per day  She uses smokeless tobacco  She reports that she drank alcohol   She reports that she does not use drugs   Current Outpatient Medications   Medication Sig Dispense Refill    aspirin (ASPIRIN ADULT LOW STRENGTH) 81 mg EC tablet Take 1 tablet (81 mg total) by mouth daily 30 tablet 3    Blood Glucose Monitoring Suppl (ONETOUCH VERIO FLEX SYSTEM) w/Device KIT USE TO TEST BLOOD SUGARS ONCE DAILY  0    cholecalciferol (VITAMIN D3) 1,000 units tablet Take 1,000 Units by mouth daily      conjugated estrogens (PREMARIN) vaginal cream Insert 1 g into the vagina 2 (two) times a day for 10 days 42 5 g 0    estradiol (ESTRACE VAGINAL) 0 1 mg/g vaginal cream Insert into the vagina      estradiol valerate (DELESTROGEN) 40 MG/ML injection Inject 0 5 mL (20 mg total) into a muscle every 14 (fourteen) days 5 mL 3    estradiol valerate (DELESTROGEN) 40 MG/ML injection Inject 1 mL (40 mg total) into a muscle every 14 (fourteen) days 5 mL 4    glipiZIDE (GLUCOTROL XL) 5 mg 24 hr tablet Take 1 tablet (5 mg total) by mouth daily 30 tablet 3    levothyroxine 150 mcg tablet Take 1 tablet (150 mcg total) by mouth daily 30 tablet 3    metFORMIN (GLUCOPHAGE) 500 mg tablet Take 1 tablet (500 mg total) by mouth 2 (two) times a day with meals 60 tablet 5    omega-3-acid ethyl esters (LOVAZA) 1 g capsule Take 2 capsules (2 g total) by mouth 2 (two) times a day 60 capsule 3    PREMARIN 1 25 MG tablet TAKE 2 TABLETS BY MOUTH TWICE DAILY 360 tablet 1    rosuvastatin (CRESTOR) 20 MG tablet Take 1 tablet (20 mg total) by mouth daily 30 tablet 3    SAFETY-LUCIANO 3CC SYR 21GX1 5" 21G X 1-1/2" 3 ML MISC USE AS DIRECTED 30 each 0    sitaGLIPtin (JANUVIA) 50 mg tablet Take 1 tablet (50 mg total) by mouth daily 30 tablet 2    spironolactone (ALDACTONE) 100 mg tablet TAKE 1 TABLET TWICE DAILY   180 tablet 3    SYRINGE-NEEDLE, DISP, 3 ML (LUER LOCK SAFETY SYRINGES) 22G X 1-1/2" 3 ML MISC by Does not apply route every 14 (fourteen) days 20 each 3    traMADol (ULTRAM) 50 mg tablet Take 1 tablet (50 mg total) by mouth every 8 (eight) hours as needed for severe pain 20 tablet 0    zolpidem (AMBIEN CR) 12 5 MG CR tablet Take 12 5 mg by mouth every evening  2     No current facility-administered medications for this visit          Allergies   Allergen Reactions    Bee Venom Anaphylaxis    Penicillins Seizures    Pineapple Anaphylaxis    Keflex [Cephalexin] GI Intolerance    Latex Hives    Other        Current Outpatient Medications:     aspirin (ASPIRIN ADULT LOW STRENGTH) 81 mg EC tablet, Take 1 tablet (81 mg total) by mouth daily, Disp: 30 tablet, Rfl: 3    Blood Glucose Monitoring Suppl (ONETOUCH VERIO FLEX SYSTEM) w/Device KIT, USE TO TEST BLOOD SUGARS ONCE DAILY, Disp: , Rfl: 0    cholecalciferol (VITAMIN D3) 1,000 units tablet, Take 1,000 Units by mouth daily, Disp: , Rfl:     conjugated estrogens (PREMARIN) vaginal cream, Insert 1 g into the vagina 2 (two) times a day for 10 days, Disp: 42 5 g, Rfl: 0    estradiol (ESTRACE VAGINAL) 0 1 mg/g vaginal cream, Insert into the vagina, Disp: , Rfl:     estradiol valerate (DELESTROGEN) 40 MG/ML injection, Inject 0 5 mL (20 mg total) into a muscle every 14 (fourteen) days, Disp: 5 mL, Rfl: 3    estradiol valerate (DELESTROGEN) 40 MG/ML injection, Inject 1 mL (40 mg total) into a muscle every 14 (fourteen) days, Disp: 5 mL, Rfl: 4    glipiZIDE (GLUCOTROL XL) 5 mg 24 hr tablet, Take 1 tablet (5 mg total) by mouth daily, Disp: 30 tablet, Rfl: 3    levothyroxine 150 mcg tablet, Take 1 tablet (150 mcg total) by mouth daily, Disp: 30 tablet, Rfl: 3    metFORMIN (GLUCOPHAGE) 500 mg tablet, Take 1 tablet (500 mg total) by mouth 2 (two) times a day with meals, Disp: 60 tablet, Rfl: 5    omega-3-acid ethyl esters (LOVAZA) 1 g capsule, Take 2 capsules (2 g total) by mouth 2 (two) times a day, Disp: 60 capsule, Rfl: 3    PREMARIN 1 25 MG tablet, TAKE 2 TABLETS BY MOUTH TWICE DAILY, Disp: 360 tablet, Rfl: 1    rosuvastatin (CRESTOR) 20 MG tablet, Take 1 tablet (20 mg total) by mouth daily, Disp: 30 tablet, Rfl: 3    SAFETY-LUCIANO 3CC SYR 21GX1 5" 21G X 1-1/2" 3 ML MISC, USE AS DIRECTED, Disp: 30 each, Rfl: 0    sitaGLIPtin (JANUVIA) 50 mg tablet, Take 1 tablet (50 mg total) by mouth daily, Disp: 30 tablet, Rfl: 2    spironolactone (ALDACTONE) 100 mg tablet, TAKE 1 TABLET TWICE DAILY  , Disp: 180 tablet, Rfl: 3    SYRINGE-NEEDLE, DISP, 3 ML (LUER LOCK SAFETY SYRINGES) 22G X 1-1/2" 3 ML MISC, by Does not apply route every 14 (fourteen) days, Disp: 20 each, Rfl: 3    traMADol (ULTRAM) 50 mg tablet, Take 1 tablet (50 mg total) by mouth every 8 (eight) hours as needed for severe pain, Disp: 20 tablet, Rfl: 0    zolpidem (AMBIEN CR) 12 5 MG CR tablet, Take 12 5 mg by mouth every evening, Disp: , Rfl: 2    Patient Active Problem List   Diagnosis    Hypothyroidism    Adolescent or adult gender identity disorder    Hypertriglyceridemia    Type 2 diabetes mellitus without complication, without long-term current use of insulin (HCC)    Tobacco abuse    HTN, goal below 140/90    Chronic bronchitis (HCC)    Xanthoma    Nutritional counseling    Chronic pain of both knees     Past Surgical History:   Procedure Laterality Date    TONSILLECTOMY       Family History   Problem Relation Age of Onset    Cancer Family         Unknown    Stroke Family     Depression Family     Diabetes Family         Mellitus    Hypertension Family     Mental illness Family      Health Maintenance   Topic Date Due    MAMMOGRAM  1970    Diabetic Foot Exam  12/25/1980    DM Eye Exam  12/25/1980    Annual Physical  12/25/1988    Cervical Cancer Screening  12/25/1991    Influenza Vaccine  07/01/2019    URINE MICROALBUMIN  01/24/2020    HEMOGLOBIN A1C  05/14/2020    BMI: Followup Plan  12/12/2020    Depression Screening PHQ  01/29/2021    BMI: Adult  01/29/2021    DTaP,Tdap,and Td Vaccines (2 - Td) 12/10/2023    Pneumococcal Vaccine: 65+ Years (1 of 2 - PCV13) 12/25/2035    HIV Screening  Completed    Hepatitis C Screening  Completed    Pneumococcal Vaccine: Pediatrics (0 to 5 Years) and At-Risk Patients (6 to 59 Years)  Completed    Hepatitis B Vaccine  Completed    HIB Vaccine  Aged Out    IPV Vaccine  Aged Out    Hepatitis A Vaccine  Aged Out    Meningococcal ACWY Vaccine  Aged Out    HPV Vaccine  Aged Out

## 2020-01-29 NOTE — ASSESSMENT & PLAN NOTE
- has been taking NSAIDs but with no relief  - will send tramadol 50 mg to take only when pain is severe    - will obtain bilateral x-rays of knees  - patient may need corticosteroid injection

## 2020-01-29 NOTE — ASSESSMENT & PLAN NOTE
Patient's blood pressure is controlled  Will continue spironolactone 100mg  B i d    Will hold lisinopril for now as patient is very confused about the medications she is taking  Considering her blood pressure is controlled and her diabetes control is more important we will hold lisinopril for now until she demonstrates that she is compliant with her medications  Patient denies any side effects with medications  Patient educated on the importance of weight loss, and appropriate dieting  Patient admits to be compliant with medications

## 2020-02-04 RX ORDER — ESTRADIOL VALERATE 40 MG/ML
20 INJECTION INTRAMUSCULAR
Qty: 15 ML | Refills: 0 | Status: SHIPPED | OUTPATIENT
Start: 2020-02-04 | End: 2020-06-04

## 2020-02-10 DIAGNOSIS — Z78.9 MALE-TO-FEMALE TRANSGENDER PERSON: ICD-10-CM

## 2020-02-14 RX ORDER — SPIRONOLACTONE 100 MG/1
TABLET, FILM COATED ORAL
Qty: 180 TABLET | Refills: 3 | Status: SHIPPED | OUTPATIENT
Start: 2020-02-14 | End: 2021-03-05

## 2020-02-16 DIAGNOSIS — E78.1 HYPERTRIGLYCERIDEMIA: ICD-10-CM

## 2020-02-16 DIAGNOSIS — E03.8 OTHER SPECIFIED HYPOTHYROIDISM: ICD-10-CM

## 2020-02-17 RX ORDER — ROSUVASTATIN CALCIUM 20 MG/1
TABLET, COATED ORAL
Qty: 90 TABLET | Refills: 1 | Status: SHIPPED | OUTPATIENT
Start: 2020-02-17 | End: 2020-11-10

## 2020-02-17 RX ORDER — LEVOTHYROXINE SODIUM 0.15 MG/1
TABLET ORAL
Qty: 90 TABLET | Refills: 1 | Status: SHIPPED | OUTPATIENT
Start: 2020-02-17 | End: 2020-02-26 | Stop reason: SDUPTHER

## 2020-02-22 ENCOUNTER — APPOINTMENT (OUTPATIENT)
Dept: LAB | Facility: HOSPITAL | Age: 50
End: 2020-02-22
Attending: FAMILY MEDICINE
Payer: COMMERCIAL

## 2020-02-22 ENCOUNTER — HOSPITAL ENCOUNTER (OUTPATIENT)
Dept: RADIOLOGY | Facility: HOSPITAL | Age: 50
Discharge: HOME | End: 2020-02-22
Attending: FAMILY MEDICINE
Payer: COMMERCIAL

## 2020-02-22 DIAGNOSIS — G89.29 CHRONIC PAIN OF BOTH KNEES: ICD-10-CM

## 2020-02-22 DIAGNOSIS — M25.562 CHRONIC PAIN OF BOTH KNEES: ICD-10-CM

## 2020-02-22 DIAGNOSIS — E03.9 ACQUIRED HYPOTHYROIDISM: ICD-10-CM

## 2020-02-22 DIAGNOSIS — I10 HTN, GOAL BELOW 140/90: ICD-10-CM

## 2020-02-22 DIAGNOSIS — E78.1 HYPERTRIGLYCERIDEMIA: ICD-10-CM

## 2020-02-22 DIAGNOSIS — M25.561 CHRONIC PAIN OF BOTH KNEES: ICD-10-CM

## 2020-02-22 LAB
ALBUMIN SERPL BCP-MCNC: 3.6 G/DL (ref 3–5.2)
ALP SERPL-CCNC: 51 U/L (ref 43–122)
ALT SERPL W P-5'-P-CCNC: 20 U/L (ref 9–52)
ANION GAP SERPL CALCULATED.3IONS-SCNC: 7 MMOL/L (ref 5–14)
AST SERPL W P-5'-P-CCNC: 7 U/L (ref 17–36)
BILIRUB SERPL-MCNC: 0.3 MG/DL
BUN SERPL-MCNC: 15 MG/DL (ref 5–25)
CALCIUM SERPL-MCNC: 9 MG/DL (ref 8.4–10.2)
CHLORIDE SERPL-SCNC: 99 MMOL/L (ref 97–108)
CHOLEST SERPL-MCNC: 179 MG/DL
CO2 SERPL-SCNC: 28 MMOL/L (ref 22–30)
CREAT SERPL-MCNC: 0.4 MG/DL (ref 0.7–1.2)
GLUCOSE P FAST SERPL-MCNC: 279 MG/DL (ref 70–99)
HDLC SERPL-MCNC: 50 MG/DL
NONHDLC SERPL-MCNC: 129 MG/DL
POTASSIUM SERPL-SCNC: 3.9 MMOL/L (ref 3.6–5)
PROT SERPL-MCNC: 6.3 G/DL (ref 5.9–8.4)
SODIUM SERPL-SCNC: 134 MMOL/L (ref 137–147)
TRIGL SERPL-MCNC: 412 MG/DL
TSH SERPL DL<=0.05 MIU/L-ACNC: 5.68 UIU/ML (ref 0.47–4.68)

## 2020-02-22 PROCEDURE — 80061 LIPID PANEL: CPT

## 2020-02-22 PROCEDURE — 36415 COLL VENOUS BLD VENIPUNCTURE: CPT

## 2020-02-22 PROCEDURE — 80053 COMPREHEN METABOLIC PANEL: CPT

## 2020-02-22 PROCEDURE — 73562 X-RAY EXAM OF KNEE 3: CPT

## 2020-02-22 PROCEDURE — 84443 ASSAY THYROID STIM HORMONE: CPT

## 2020-02-26 ENCOUNTER — OFFICE VISIT (OUTPATIENT)
Dept: FAMILY MEDICINE CLINIC | Facility: CLINIC | Age: 50
End: 2020-02-26

## 2020-02-26 VITALS
BODY MASS INDEX: 27.67 KG/M2 | OXYGEN SATURATION: 98 % | RESPIRATION RATE: 18 BRPM | WEIGHT: 193.3 LBS | DIASTOLIC BLOOD PRESSURE: 70 MMHG | HEIGHT: 70 IN | SYSTOLIC BLOOD PRESSURE: 136 MMHG | HEART RATE: 88 BPM | TEMPERATURE: 98.6 F

## 2020-02-26 DIAGNOSIS — E78.1 HYPERTRIGLYCERIDEMIA: ICD-10-CM

## 2020-02-26 DIAGNOSIS — Z71.3 NUTRITIONAL COUNSELING: ICD-10-CM

## 2020-02-26 DIAGNOSIS — I10 HTN, GOAL BELOW 140/90: ICD-10-CM

## 2020-02-26 DIAGNOSIS — F51.01 PRIMARY INSOMNIA: ICD-10-CM

## 2020-02-26 DIAGNOSIS — E03.9 ACQUIRED HYPOTHYROIDISM: ICD-10-CM

## 2020-02-26 DIAGNOSIS — E03.8 OTHER SPECIFIED HYPOTHYROIDISM: ICD-10-CM

## 2020-02-26 DIAGNOSIS — E11.9 TYPE 2 DIABETES MELLITUS WITHOUT COMPLICATION, WITHOUT LONG-TERM CURRENT USE OF INSULIN (HCC): Primary | ICD-10-CM

## 2020-02-26 PROBLEM — J42 CHRONIC BRONCHITIS (HCC): Status: RESOLVED | Noted: 2019-01-16 | Resolved: 2020-02-26

## 2020-02-26 LAB — SL AMB POCT HEMOGLOBIN AIC: 13.4 (ref ?–6.5)

## 2020-02-26 PROCEDURE — 83036 HEMOGLOBIN GLYCOSYLATED A1C: CPT | Performed by: FAMILY MEDICINE

## 2020-02-26 PROCEDURE — 3078F DIAST BP <80 MM HG: CPT | Performed by: FAMILY MEDICINE

## 2020-02-26 PROCEDURE — 3046F HEMOGLOBIN A1C LEVEL >9.0%: CPT | Performed by: FAMILY MEDICINE

## 2020-02-26 PROCEDURE — 3060F POS MICROALBUMINURIA REV: CPT | Performed by: FAMILY MEDICINE

## 2020-02-26 PROCEDURE — 3075F SYST BP GE 130 - 139MM HG: CPT | Performed by: FAMILY MEDICINE

## 2020-02-26 PROCEDURE — 99214 OFFICE O/P EST MOD 30 MIN: CPT | Performed by: FAMILY MEDICINE

## 2020-02-26 PROCEDURE — 3008F BODY MASS INDEX DOCD: CPT | Performed by: FAMILY MEDICINE

## 2020-02-26 RX ORDER — BLOOD-GLUCOSE METER
EACH MISCELLANEOUS DAILY
Qty: 1 KIT | Refills: 0 | Status: SHIPPED | OUTPATIENT
Start: 2020-02-26

## 2020-02-26 RX ORDER — LEVOTHYROXINE SODIUM 175 UG/1
175 TABLET ORAL DAILY
Qty: 30 TABLET | Refills: 2 | Status: SHIPPED | OUTPATIENT
Start: 2020-02-26 | End: 2021-03-19 | Stop reason: SDUPTHER

## 2020-02-26 RX ORDER — ZOLPIDEM TARTRATE 12.5 MG/1
12.5 TABLET, FILM COATED, EXTENDED RELEASE ORAL
Qty: 30 TABLET | Refills: 2 | Status: SHIPPED | OUTPATIENT
Start: 2020-02-26 | End: 2021-01-27

## 2020-02-26 RX ORDER — LANCETS 33 GAUGE
EACH MISCELLANEOUS DAILY
Qty: 100 EACH | Refills: 1 | Status: SHIPPED | OUTPATIENT
Start: 2020-02-26

## 2020-02-26 NOTE — ASSESSMENT & PLAN NOTE
Lab Results   Component Value Date    HGBA1C 13 4 (A) 02/26/2020       - patient admits that she has been only taking Januvia 50 once daily  Therefore I have advised patient to take her medications as prescribed which include glipizide 5 mg daily and metformin 500mg BID and januvia 50mg BID     - continue plant based diet

## 2020-02-26 NOTE — PROGRESS NOTES
Chief Complaint   Patient presents with    Diabetes     follow up     /70 (BP Location: Left arm, Patient Position: Sitting, Cuff Size: Standard)   Pulse 88   Temp 98 6 °F (37 °C) (Temporal)   Resp 18   Ht 5' 10" (1 778 m)   Wt 87 7 kg (193 lb 4 8 oz)   LMP  (LMP Unknown)   SpO2 98%   BMI 27 74 kg/m²      Assessment/Plan   Type 2 diabetes mellitus without complication, without long-term current use of insulin ([facility])    Lab Results   Component Value Date    HGBA1C 13 4 (A) 02/26/2020       - patient admits that she has been only taking Januvia 50 once daily  Therefore I have advised patient to take her medications as prescribed which include glipizide 5 mg daily and metformin 500mg BID and januvia 50mg BID  - continue plant based diet      HTN, goal below 140/90  Patient's blood pressure is controlled  Will continue spironolactone 100mg  B i d    Will hold lisinopril for now as patient is very confused about the medications she is taking  Considering her blood pressure is controlled and her diabetes control is more important we will hold lisinopril for now until she demonstrates that she is compliant with her medications  Patient denies any side effects with medications  Patient educated on the importance of weight loss, and appropriate dieting  Patient admits to be compliant with medications  Hypertriglyceridemia  - will obtain lipid panel for next visit  Triglycerides have improved significantly to 400  Unsure if this is a combination of diet and the cessation of estrogen  However patient does want to reinitiate her estrogen  Therefore, will continue fenofibrate daily and dietary restrictions  - extensive dietary counseling given to the patient  Nutritional counseling  - discussed in detail with patient to eat predominantly a plant based diet which includes grains, greens and beans    - elimination of dairy products  - discuss the differences between carbohydrate and which of these she should be consuming   - discussed the importance of increasing fiber intake  - discussed the importance of reducing animal products such as poultry and red meat  Primary insomnia  - will refill patient's zolpidem 12 5 mg daily    Hypothyroidism  - review TSH still elevated at 5 8  Therefore, will increase levothyroxine to 175 mcg daily  Patient to obtain TSH with follow-up visit in 3 months  Diagnoses and all orders for this visit:    Type 2 diabetes mellitus without complication, without long-term current use of insulin ([facility])  -     POCT hemoglobin A1c  -     HEMOGLOBIN A1C W/ EAG ESTIMATION; Future  -     Blood Glucose Monitoring Suppl (ONETOUCH VERIO FLEX SYSTEM) w/Device KIT; by Other route daily  -     glucose blood (OneTouch Verio) test strip; 1 each by Other route daily Use as instructed  -     OneTouch Delica Lancets 01E MISC; by Does not apply route daily    Acquired hypothyroidism  -     TSH, 3rd generation; Future    HTN, goal below 140/90    Hypertriglyceridemia  -     Lipid panel; Future    Nutritional counseling    Other specified hypothyroidism  -     levothyroxine 175 mcg tablet; Take 1 tablet (175 mcg total) by mouth daily    Primary insomnia  -     zolpidem (AMBIEN CR) 12 5 MG CR tablet; Take 1 tablet (12 5 mg total) by mouth daily at bedtime       Shilo Hess is a 52 y o  adult  Information was obtained from the patient  The language used was [place]  Patient here for follow-up visit for diabetes  She admits that she has not been compliant with the medications and is only been taking Januvia once daily  Again she adamantly refuses insulin therapy and would like to try compliance with the medications before referral to endocrinology which was discussed with her  Patient was congratulated on her dietary and lifestyle modification efforts which have reduced her triglyceride significantly  No other complaints at the moment      Review of Systems Constitutional: Negative for fatigue and fever  HENT: Negative for congestion and sore throat  Eyes: Negative for visual disturbance  Respiratory: Negative for cough, shortness of breath and wheezing  Cardiovascular: Negative for chest pain, palpitations and leg swelling  Gastrointestinal: Negative for abdominal pain, anal bleeding, constipation, diarrhea, nausea and vomiting  Endocrine: Negative for cold intolerance and heat intolerance  Musculoskeletal: Positive for arthralgias  Negative for joint swelling  Skin: Negative for color change  Neurological: Negative for dizziness, seizures, syncope, weakness and light-headedness  Psychiatric/Behavioral: Negative for agitation, confusion, sleep disturbance and suicidal ideas  Pertinent items are noted in HPI  Social History  - reports that she has been smoking cigarettes  She has been smoking about 0 50 packs per day  She uses smokeless tobacco   - reports that she drank alcohol    - reports that she does not use drugs  Objective     Physical Exam   Constitutional: She is oriented to person, place, and time  She appears well-developed and well-nourished  HENT:   Head: Normocephalic and atraumatic  Eyes: Pupils are equal, round, and reactive to light  Conjunctivae and EOM are normal    Neck: Normal range of motion  Neck supple  No JVD present  Cardiovascular: Normal rate, regular rhythm, normal heart sounds and intact distal pulses  Exam reveals no gallop and no friction rub  No murmur heard  Pulmonary/Chest: Effort normal and breath sounds normal  No respiratory distress  She has no wheezes  Abdominal: Soft  Bowel sounds are normal  She exhibits no distension  There is no tenderness  Musculoskeletal: Normal range of motion  Neurological: She is alert and oriented to person, place, and time  She has normal reflexes  Skin: Skin is warm and dry     Psychiatric: Her behavior is normal  Judgment and thought content normal  Health Information     The following have been reviewed and updated:   She  has a past medical history of Seizure disorder ([facility] [address] )  She   Patient Active Problem List    Diagnosis Date Noted    Primary insomnia 02/26/2020    Nutritional counseling 12/12/2019    Chronic pain of both knees 12/12/2019    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    HTN, goal below 140/90 10/15/2018    Tobacco abuse 09/26/2018    Hypertriglyceridemia 08/10/2018    Type 2 diabetes mellitus without complication, without long-term current use of insulin ([facility] ) 08/10/2018    Hypothyroidism 10/15/2014    Adolescent or adult gender identity disorder 09/18/2013     She  has a past surgical history that includes Tonsillectomy  Her family history includes Cancer in her family; Depression in her family; Diabetes in her family; Hypertension in her family; Mental illness in her family; Stroke in her family  She  reports that she has been smoking cigarettes  She has been smoking about 0 50 packs per day  She uses smokeless tobacco  She reports that she drank alcohol  She reports that she does not use drugs  Current Outpatient Medications   Medication Sig Dispense Refill    aspirin (ASPIRIN ADULT LOW STRENGTH) 81 mg EC tablet Take 1 tablet (81 mg total) by mouth daily 30 tablet 3    cholecalciferol (VITAMIN D3) 1,000 units tablet Take 1,000 Units by mouth daily      estradiol valerate (DELESTROGEN) 40 MG/ML injection INJECT 0 5 ML (20 MG TOTAL) INTO A MUSCLE EVERY 14 (FOURTEEN) DAYS 15 mL 0    levothyroxine 175 mcg tablet Take 1 tablet (175 mcg total) by mouth daily 30 tablet 2    sitaGLIPtin (JANUVIA) 50 mg tablet Take 1 tablet (50 mg total) by mouth daily 30 tablet 2    spironolactone (ALDACTONE) 100 mg tablet TAKE 1 TABLET TWICE DAILY   180 tablet 3    SYRINGE-NEEDLE, DISP, 3 ML (LUER LOCK SAFETY SYRINGES) 22G X 1-1/2" 3 ML MISC by Does not apply route every 14 (fourteen) days 20 each 3    traMADol (ULTRAM) 50 mg tablet Take 1 tablet (50 mg total) by mouth daily as needed for severe pain 30 tablet 0    zolpidem (AMBIEN CR) 12 5 MG CR tablet Take 1 tablet (12 5 mg total) by mouth daily at bedtime 30 tablet 2    Blood Glucose Monitoring Suppl ([address]) w/Device KIT by Other route daily 1 kit 0    conjugated estrogens (PREMARIN) vaginal cream Insert 1 g into the vagina 2 (two) times a day for 10 days 42 5 g 0    estradiol (ESTRACE VAGINAL) 0 1 mg/g vaginal cream Insert into the vagina      estradiol valerate (DELESTROGEN) 40 MG/ML injection Inject 1 mL (40 mg total) into a muscle every 14 (fourteen) days 5 mL 4    glipiZIDE (GLUCOTROL XL) 5 mg 24 hr tablet Take 1 tablet (5 mg total) by mouth daily (Patient not taking: Reported on 2/26/2020) 30 tablet 3    glucose blood (OneTouch Verio) test strip 1 each by Other route daily Use as instructed 50 each 3    metFORMIN (GLUCOPHAGE) 500 mg tablet Take 1 tablet (500 mg total) by mouth 2 (two) times a day with meals 60 tablet 5    omega-3-acid ethyl esters (LOVAZA) 1 g capsule Take 2 capsules (2 g total) by mouth 2 (two) times a day 60 capsule 3    OneTouch Delica Lancets 34W MISC by Does not apply route daily 100 each 1    PREMARIN 1 25 MG tablet TAKE 2 TABLETS BY MOUTH TWICE DAILY 360 tablet 1    rosuvastatin (CRESTOR) 20 MG tablet TAKE 1 TABLET BY MOUTH EVERY DAY 90 tablet 1    SAFETY-LUCIANO 3CC SYR 21GX1 5" 21G X 1-1/2" 3 ML MISC USE AS DIRECTED 30 each 0     No current facility-administered medications for this visit          Allergies   Allergen Reactions    Bee Venom Anaphylaxis    Penicillins Seizures    Pineapple Anaphylaxis    Keflex [Cephalexin] GI Intolerance    Latex Hives    Other        Current Outpatient Medications:     aspirin (ASPIRIN ADULT LOW STRENGTH) 81 mg EC tablet, Take 1 tablet (81 mg total) by mouth daily, Disp: 30 tablet, Rfl: 3    cholecalciferol (VITAMIN D3) 1,000 units tablet, Take 1,000 Units by mouth daily, Disp: , Rfl:     estradiol valerate (DELESTROGEN) 40 MG/ML injection, INJECT 0 5 ML (20 MG TOTAL) INTO A MUSCLE EVERY 14 (FOURTEEN) DAYS, Disp: 15 mL, Rfl: 0    levothyroxine 175 mcg tablet, Take 1 tablet (175 mcg total) by mouth daily, Disp: 30 tablet, Rfl: 2    sitaGLIPtin (JANUVIA) 50 mg tablet, Take 1 tablet (50 mg total) by mouth daily, Disp: 30 tablet, Rfl: 2    spironolactone (ALDACTONE) 100 mg tablet, TAKE 1 TABLET TWICE DAILY  , Disp: 180 tablet, Rfl: 3    SYRINGE-NEEDLE, DISP, 3 ML (LUER LOCK SAFETY SYRINGES) 22G X 1-1/2" 3 ML MISC, by Does not apply route every 14 (fourteen) days, Disp: 20 each, Rfl: 3    traMADol (ULTRAM) 50 mg tablet, Take 1 tablet (50 mg total) by mouth daily as needed for severe pain, Disp: 30 tablet, Rfl: 0    zolpidem (AMBIEN CR) 12 5 MG CR tablet, Take 1 tablet (12 5 mg total) by mouth daily at bedtime, Disp: 30 tablet, Rfl: 2    Blood Glucose Monitoring Suppl (ONETOUCH VERIO FLEX SYSTEM) w/Device KIT, by Other route daily, Disp: 1 kit, Rfl: 0    conjugated estrogens (PREMARIN) vaginal cream, Insert 1 g into the vagina 2 (two) times a day for 10 days, Disp: 42 5 g, Rfl: 0    estradiol (ESTRACE VAGINAL) 0 1 mg/g vaginal cream, Insert into the vagina, Disp: , Rfl:     estradiol valerate (DELESTROGEN) 40 MG/ML injection, Inject 1 mL (40 mg total) into a muscle every 14 (fourteen) days, Disp: 5 mL, Rfl: 4    glipiZIDE (GLUCOTROL XL) 5 mg 24 hr tablet, Take 1 tablet (5 mg total) by mouth daily (Patient not taking: Reported on 2/26/2020), Disp: 30 tablet, Rfl: 3    glucose blood (OneTouch Verio) test strip, 1 each by Other route daily Use as instructed, Disp: 50 each, Rfl: 3    metFORMIN (GLUCOPHAGE) 500 mg tablet, Take 1 tablet (500 mg total) by mouth 2 (two) times a day with meals, Disp: 60 tablet, Rfl: 5    omega-3-acid ethyl esters (LOVAZA) 1 g capsule, Take 2 capsules (2 g total) by mouth 2 (two) times a day, Disp: 60 capsule, Rfl: 3    OneTouch Delica Lancets 14N MISC, by Does not apply route daily, Disp: 100 each, Rfl: 1    PREMARIN 1 25 MG tablet, TAKE 2 TABLETS BY MOUTH TWICE DAILY, Disp: 360 tablet, Rfl: 1    rosuvastatin (CRESTOR) 20 MG tablet, TAKE 1 TABLET BY MOUTH EVERY DAY, Disp: 90 tablet, Rfl: 1    SAFETY-LUCIANO 3CC SYR 21GX1 5" 21G X 1-1/2" 3 ML MISC, USE AS DIRECTED, Disp: 30 each, Rfl: 0    Patient Active Problem List   Diagnosis    Hypothyroidism    Adolescent or adult gender identity disorder    Hypertriglyceridemia    Type 2 diabetes mellitus without complication, without long-term current use of insulin (HCC)    Tobacco abuse    HTN, goal below 140/90    Xanthoma    Nutritional counseling    Chronic pain of both knees    Primary insomnia     Past Surgical History:   Procedure Laterality Date    TONSILLECTOMY       Family History   Problem Relation Age of Onset    Cancer Family         Unknown    Stroke Family     Depression Family     Diabetes Family         Mellitus    Hypertension Family     Mental illness Family      Health Maintenance   Topic Date Due    MAMMOGRAM  1970    Diabetic Foot Exam  12/25/1980    DM Eye Exam  12/25/1980    Annual Physical  12/25/1988    Cervical Cancer Screening  12/25/1991    Influenza Vaccine  07/01/2019    HEMOGLOBIN A1C  05/14/2020    BMI: Followup Plan  12/12/2020    Depression Screening PHQ  01/29/2021    BMI: Adult  01/29/2021    URINE MICROALBUMIN  01/29/2021    DTaP,Tdap,and Td Vaccines (2 - Td) 12/10/2023    Pneumococcal Vaccine: 65+ Years (1 of 2 - PCV13) 12/25/2035    HIV Screening  Completed    Hepatitis C Screening  Completed    Pneumococcal Vaccine: Pediatrics (0 to 5 Years) and At-Risk Patients (6 to 59 Years)  Completed    HIB Vaccine  Aged Out    Hepatitis B Vaccine  Aged Out    IPV Vaccine  Aged Out    Hepatitis A Vaccine  Aged Out    Meningococcal ACWY Vaccine  Aged Out    HPV Vaccine  Aged Out

## 2020-02-26 NOTE — ASSESSMENT & PLAN NOTE
- review TSH still elevated at 5 8  Therefore, will increase levothyroxine to 175 mcg daily  Patient to obtain TSH with follow-up visit in 3 months

## 2020-02-26 NOTE — ASSESSMENT & PLAN NOTE
- will obtain lipid panel for next visit  Triglycerides have improved significantly to 400  Unsure if this is a combination of diet and the cessation of estrogen  However patient does want to reinitiate her estrogen  Therefore, will continue fenofibrate daily and dietary restrictions  - extensive dietary counseling given to the patient

## 2020-03-19 DIAGNOSIS — I10 HTN, GOAL BELOW 140/90: ICD-10-CM

## 2020-03-19 PROCEDURE — 4010F ACE/ARB THERAPY RXD/TAKEN: CPT | Performed by: FAMILY MEDICINE

## 2020-03-19 RX ORDER — LISINOPRIL 2.5 MG/1
TABLET ORAL
Qty: 90 TABLET | Refills: 2 | OUTPATIENT
Start: 2020-03-19

## 2020-03-27 ENCOUNTER — TELEPHONE (OUTPATIENT)
Dept: FAMILY MEDICINE CLINIC | Facility: CLINIC | Age: 50
End: 2020-03-27

## 2020-03-27 DIAGNOSIS — E11.9 TYPE 2 DIABETES MELLITUS WITHOUT COMPLICATION, WITHOUT LONG-TERM CURRENT USE OF INSULIN (HCC): ICD-10-CM

## 2020-03-27 RX ORDER — SITAGLIPTIN 50 MG/1
TABLET, FILM COATED ORAL
Qty: 90 TABLET | Refills: 0 | Status: SHIPPED | OUTPATIENT
Start: 2020-03-27 | End: 2021-01-27

## 2020-03-27 NOTE — TELEPHONE ENCOUNTER
Patient called requesting Flexeril  Patient stated she cannot move freely due to pain and if it can be sent to RX before 11am in order for her sister to pick it up which is what time she will be able to

## 2020-03-27 NOTE — TELEPHONE ENCOUNTER
PT CONTACTED OFFICE REQUESTING STATUS OF MED  PT WOULD LIKE TO KNOW IF SHE CAN HAVE MED SENT TO THE PHARMACY TODAY

## 2020-04-06 ENCOUNTER — TELEPHONE (OUTPATIENT)
Dept: FAMILY MEDICINE CLINIC | Facility: CLINIC | Age: 50
End: 2020-04-06

## 2020-04-06 ENCOUNTER — DOCUMENTATION (OUTPATIENT)
Dept: URGENT CARE | Facility: MEDICAL CENTER | Age: 50
End: 2020-04-06

## 2020-04-06 ENCOUNTER — TELEMEDICINE (OUTPATIENT)
Dept: FAMILY MEDICINE CLINIC | Facility: CLINIC | Age: 50
End: 2020-04-06

## 2020-04-06 ENCOUNTER — OFFICE VISIT (OUTPATIENT)
Dept: URGENT CARE | Facility: MEDICAL CENTER | Age: 50
End: 2020-04-06
Payer: COMMERCIAL

## 2020-04-06 DIAGNOSIS — Z20.828 EXPOSURE TO SARS-ASSOCIATED CORONAVIRUS: ICD-10-CM

## 2020-04-06 DIAGNOSIS — Z20.828 EXPOSURE TO SARS-ASSOCIATED CORONAVIRUS: Primary | ICD-10-CM

## 2020-04-06 PROCEDURE — 87635 SARS-COV-2 COVID-19 AMP PRB: CPT | Performed by: PHYSICIAN ASSISTANT

## 2020-04-06 PROCEDURE — 99441 PR PHYS/QHP TELEPHONE EVALUATION 5-10 MIN: CPT | Performed by: FAMILY MEDICINE

## 2020-04-07 LAB — SARS-COV-2 RNA SPEC QL NAA+PROBE: NOT DETECTED

## 2020-05-07 DIAGNOSIS — E11.9 TYPE 2 DIABETES MELLITUS WITHOUT COMPLICATION, WITHOUT LONG-TERM CURRENT USE OF INSULIN (HCC): ICD-10-CM

## 2020-05-07 RX ORDER — ASPIRIN 81 MG/1
TABLET, COATED ORAL
Qty: 90 TABLET | Refills: 1 | Status: SHIPPED | OUTPATIENT
Start: 2020-05-07 | End: 2020-11-10

## 2020-05-15 DIAGNOSIS — G44.209 TENSION HEADACHE: ICD-10-CM

## 2020-05-15 RX ORDER — NAPROXEN 500 MG/1
500 TABLET ORAL 2 TIMES DAILY WITH MEALS
Qty: 30 TABLET | Refills: 0 | Status: SHIPPED | OUTPATIENT
Start: 2020-05-15 | End: 2020-08-31 | Stop reason: SDUPTHER

## 2020-05-23 DIAGNOSIS — E78.2 MIXED HYPERLIPIDEMIA: ICD-10-CM

## 2020-05-23 DIAGNOSIS — E78.1 HYPERTRIGLYCERIDEMIA: Primary | ICD-10-CM

## 2020-05-23 RX ORDER — FENOFIBRATE 145 MG/1
TABLET, COATED ORAL
Qty: 90 TABLET | Refills: 1 | Status: SHIPPED | OUTPATIENT
Start: 2020-05-23 | End: 2021-01-18

## 2020-05-29 DIAGNOSIS — Z78.9 TRANSGENDER: ICD-10-CM

## 2020-06-04 RX ORDER — ESTRADIOL VALERATE 40 MG/ML
20 INJECTION INTRAMUSCULAR
Qty: 15 ML | Refills: 0 | Status: SHIPPED | OUTPATIENT
Start: 2020-06-04 | End: 2021-02-12 | Stop reason: SDUPTHER

## 2020-06-08 DIAGNOSIS — M25.561 CHRONIC PAIN OF BOTH KNEES: ICD-10-CM

## 2020-06-08 DIAGNOSIS — M25.562 CHRONIC PAIN OF BOTH KNEES: ICD-10-CM

## 2020-06-08 DIAGNOSIS — G89.29 CHRONIC PAIN OF BOTH KNEES: ICD-10-CM

## 2020-06-08 RX ORDER — TRAMADOL HYDROCHLORIDE 50 MG/1
TABLET ORAL
Qty: 30 TABLET | Refills: 0 | OUTPATIENT
Start: 2020-06-08

## 2020-06-19 DIAGNOSIS — E11.9 TYPE 2 DIABETES MELLITUS WITHOUT COMPLICATION, WITHOUT LONG-TERM CURRENT USE OF INSULIN (HCC): ICD-10-CM

## 2020-08-28 ENCOUNTER — TELEPHONE (OUTPATIENT)
Dept: FAMILY MEDICINE CLINIC | Facility: CLINIC | Age: 50
End: 2020-08-28

## 2020-08-28 DIAGNOSIS — G44.209 TENSION HEADACHE: ICD-10-CM

## 2020-08-28 DIAGNOSIS — M54.9 ACUTE BACK PAIN, UNSPECIFIED BACK LOCATION, UNSPECIFIED BACK PAIN LATERALITY: Primary | ICD-10-CM

## 2020-08-28 NOTE — TELEPHONE ENCOUNTER
Patient is calling in for medication refill for back which I dont see on medication list  Arjun Pham hasn't been filled in a while  Said her back is out and wants the refill  Told her I can pass along the message

## 2020-08-31 RX ORDER — CYCLOBENZAPRINE HCL 10 MG
10 TABLET ORAL 2 TIMES DAILY PRN
Qty: 20 TABLET | Refills: 0 | Status: SHIPPED | OUTPATIENT
Start: 2020-08-31 | End: 2021-07-26 | Stop reason: SDUPTHER

## 2020-08-31 RX ORDER — NAPROXEN 500 MG/1
500 TABLET ORAL 2 TIMES DAILY WITH MEALS
Qty: 30 TABLET | Refills: 0 | Status: SHIPPED | OUTPATIENT
Start: 2020-08-31 | End: 2020-10-21

## 2020-08-31 NOTE — TELEPHONE ENCOUNTER
Please call patient and inform her that I have sent her naproxen 500mg  She can take one tablet twice a day WITH FOOD for 3 days to decrease her pain and inflammation  Also patient needs follow up visit in the office  Please schedule her with me for next available appointment  NO DOUBLE BOOK    Thanks

## 2020-08-31 NOTE — TELEPHONE ENCOUNTER
States she's been taking naproxen and it's not helping  I offered her a same day appt and she declined, only wants to see you and you are booked through October   She was requesting Flexeril which she says it what she got last year when she hurt her back

## 2020-08-31 NOTE — TELEPHONE ENCOUNTER
Please inform patient that I sent the Flexeril that she can take up to 2 times daily as needed  She is not to operate heavy machinery or drive while taking the medication  So probably best to take once a day before bed  Otherwise, she can be scheduled in October for a Dm2/HTN follow up     Thanks

## 2020-10-21 ENCOUNTER — OFFICE VISIT (OUTPATIENT)
Dept: FAMILY MEDICINE CLINIC | Facility: CLINIC | Age: 50
End: 2020-10-21

## 2020-10-21 VITALS
WEIGHT: 184 LBS | RESPIRATION RATE: 17 BRPM | BODY MASS INDEX: 26.4 KG/M2 | HEART RATE: 109 BPM | OXYGEN SATURATION: 94 % | DIASTOLIC BLOOD PRESSURE: 80 MMHG | TEMPERATURE: 96.9 F | SYSTOLIC BLOOD PRESSURE: 140 MMHG

## 2020-10-21 DIAGNOSIS — I10 HTN, GOAL BELOW 140/90: ICD-10-CM

## 2020-10-21 DIAGNOSIS — E78.1 HYPERTRIGLYCERIDEMIA: ICD-10-CM

## 2020-10-21 DIAGNOSIS — E11.9 TYPE 2 DIABETES MELLITUS WITHOUT COMPLICATION, WITHOUT LONG-TERM CURRENT USE OF INSULIN (HCC): Primary | ICD-10-CM

## 2020-10-21 DIAGNOSIS — E03.9 ACQUIRED HYPOTHYROIDISM: ICD-10-CM

## 2020-10-21 PROBLEM — Z71.3 NUTRITIONAL COUNSELING: Status: RESOLVED | Noted: 2019-12-12 | Resolved: 2020-10-21

## 2020-10-21 LAB — SL AMB POCT HEMOGLOBIN AIC: 13.5 (ref ?–6.5)

## 2020-10-21 PROCEDURE — 99214 OFFICE O/P EST MOD 30 MIN: CPT | Performed by: FAMILY MEDICINE

## 2020-10-21 PROCEDURE — 83036 HEMOGLOBIN GLYCOSYLATED A1C: CPT | Performed by: FAMILY MEDICINE

## 2020-10-21 PROCEDURE — 3046F HEMOGLOBIN A1C LEVEL >9.0%: CPT | Performed by: FAMILY MEDICINE

## 2020-10-21 RX ORDER — GLIPIZIDE 5 MG/1
5 TABLET, FILM COATED, EXTENDED RELEASE ORAL DAILY
Qty: 30 TABLET | Refills: 3 | Status: SHIPPED | OUTPATIENT
Start: 2020-10-21 | End: 2021-03-01

## 2020-10-21 RX ORDER — LEVOTHYROXINE SODIUM 0.15 MG/1
150 TABLET ORAL DAILY
COMMUNITY
Start: 2020-09-17 | End: 2020-10-21

## 2020-11-02 ENCOUNTER — CONSULT (OUTPATIENT)
Dept: ENDOCRINOLOGY | Facility: CLINIC | Age: 50
End: 2020-11-02
Payer: COMMERCIAL

## 2020-11-02 VITALS
TEMPERATURE: 97.8 F | WEIGHT: 192.4 LBS | SYSTOLIC BLOOD PRESSURE: 150 MMHG | HEIGHT: 70 IN | BODY MASS INDEX: 27.54 KG/M2 | DIASTOLIC BLOOD PRESSURE: 88 MMHG

## 2020-11-02 DIAGNOSIS — E11.9 TYPE 2 DIABETES MELLITUS WITHOUT COMPLICATION, WITHOUT LONG-TERM CURRENT USE OF INSULIN (HCC): ICD-10-CM

## 2020-11-02 DIAGNOSIS — E03.9 ACQUIRED HYPOTHYROIDISM: ICD-10-CM

## 2020-11-02 DIAGNOSIS — E78.1 HYPERTRIGLYCERIDEMIA: ICD-10-CM

## 2020-11-02 PROCEDURE — 3008F BODY MASS INDEX DOCD: CPT | Performed by: INTERNAL MEDICINE

## 2020-11-02 PROCEDURE — 99244 OFF/OP CNSLTJ NEW/EST MOD 40: CPT | Performed by: INTERNAL MEDICINE

## 2020-11-02 PROCEDURE — 3077F SYST BP >= 140 MM HG: CPT | Performed by: INTERNAL MEDICINE

## 2020-11-02 PROCEDURE — 3008F BODY MASS INDEX DOCD: CPT | Performed by: FAMILY MEDICINE

## 2020-11-02 PROCEDURE — 3079F DIAST BP 80-89 MM HG: CPT | Performed by: INTERNAL MEDICINE

## 2020-11-02 RX ORDER — FLASH GLUCOSE SENSOR
KIT MISCELLANEOUS
Qty: 2 EACH | Refills: 5 | Status: SHIPPED | OUTPATIENT
Start: 2020-11-02 | End: 2021-01-27

## 2020-11-02 RX ORDER — FLASH GLUCOSE SCANNING READER
EACH MISCELLANEOUS
Qty: 1 DEVICE | Refills: 1 | Status: SHIPPED | OUTPATIENT
Start: 2020-11-02 | End: 2021-01-27

## 2020-11-10 DIAGNOSIS — E11.9 TYPE 2 DIABETES MELLITUS WITHOUT COMPLICATION, WITHOUT LONG-TERM CURRENT USE OF INSULIN (HCC): ICD-10-CM

## 2020-11-10 DIAGNOSIS — E78.1 HYPERTRIGLYCERIDEMIA: ICD-10-CM

## 2020-11-10 RX ORDER — ASPIRIN 81 MG/1
TABLET, COATED ORAL
Qty: 90 TABLET | Refills: 1 | Status: SHIPPED | OUTPATIENT
Start: 2020-11-10 | End: 2021-07-19

## 2020-11-10 RX ORDER — ROSUVASTATIN CALCIUM 20 MG/1
TABLET, COATED ORAL
Qty: 90 TABLET | Refills: 1 | Status: SHIPPED | OUTPATIENT
Start: 2020-11-10 | End: 2021-07-19

## 2020-12-15 DIAGNOSIS — E03.8 OTHER SPECIFIED HYPOTHYROIDISM: ICD-10-CM

## 2020-12-15 RX ORDER — LEVOTHYROXINE SODIUM 0.15 MG/1
TABLET ORAL
Qty: 90 TABLET | Refills: 1 | Status: SHIPPED | OUTPATIENT
Start: 2020-12-15 | End: 2021-01-27

## 2021-01-18 DIAGNOSIS — E78.1 HYPERTRIGLYCERIDEMIA: ICD-10-CM

## 2021-01-18 RX ORDER — FENOFIBRATE 145 MG/1
TABLET, COATED ORAL
Qty: 90 TABLET | Refills: 1 | Status: SHIPPED | OUTPATIENT
Start: 2021-01-18 | End: 2021-07-19

## 2021-01-27 ENCOUNTER — OFFICE VISIT (OUTPATIENT)
Dept: FAMILY MEDICINE CLINIC | Facility: CLINIC | Age: 51
End: 2021-01-27

## 2021-01-27 VITALS
HEIGHT: 70 IN | RESPIRATION RATE: 18 BRPM | SYSTOLIC BLOOD PRESSURE: 138 MMHG | TEMPERATURE: 97.8 F | HEART RATE: 88 BPM | WEIGHT: 197.1 LBS | DIASTOLIC BLOOD PRESSURE: 72 MMHG | BODY MASS INDEX: 28.22 KG/M2

## 2021-01-27 DIAGNOSIS — M25.561 CHRONIC PAIN OF BOTH KNEES: ICD-10-CM

## 2021-01-27 DIAGNOSIS — E78.1 HYPERTRIGLYCERIDEMIA: ICD-10-CM

## 2021-01-27 DIAGNOSIS — G89.29 CHRONIC BILATERAL LOW BACK PAIN WITHOUT SCIATICA: ICD-10-CM

## 2021-01-27 DIAGNOSIS — E11.9 TYPE 2 DIABETES MELLITUS WITHOUT COMPLICATION, WITHOUT LONG-TERM CURRENT USE OF INSULIN (HCC): Primary | ICD-10-CM

## 2021-01-27 DIAGNOSIS — I10 HTN, GOAL BELOW 140/90: ICD-10-CM

## 2021-01-27 DIAGNOSIS — Z23 ENCOUNTER FOR IMMUNIZATION: ICD-10-CM

## 2021-01-27 DIAGNOSIS — M54.50 CHRONIC BILATERAL LOW BACK PAIN WITHOUT SCIATICA: ICD-10-CM

## 2021-01-27 DIAGNOSIS — E03.9 ACQUIRED HYPOTHYROIDISM: ICD-10-CM

## 2021-01-27 DIAGNOSIS — F51.01 PRIMARY INSOMNIA: ICD-10-CM

## 2021-01-27 DIAGNOSIS — M25.562 CHRONIC PAIN OF BOTH KNEES: ICD-10-CM

## 2021-01-27 DIAGNOSIS — G89.29 CHRONIC PAIN OF BOTH KNEES: ICD-10-CM

## 2021-01-27 LAB — SL AMB POCT HEMOGLOBIN AIC: 7.6 (ref ?–6.5)

## 2021-01-27 PROCEDURE — 3051F HG A1C>EQUAL 7.0%<8.0%: CPT | Performed by: FAMILY MEDICINE

## 2021-01-27 PROCEDURE — 99214 OFFICE O/P EST MOD 30 MIN: CPT | Performed by: FAMILY MEDICINE

## 2021-01-27 PROCEDURE — 83036 HEMOGLOBIN GLYCOSYLATED A1C: CPT | Performed by: FAMILY MEDICINE

## 2021-01-27 PROCEDURE — 3078F DIAST BP <80 MM HG: CPT | Performed by: FAMILY MEDICINE

## 2021-01-27 PROCEDURE — 3075F SYST BP GE 130 - 139MM HG: CPT | Performed by: FAMILY MEDICINE

## 2021-01-27 PROCEDURE — 3008F BODY MASS INDEX DOCD: CPT | Performed by: FAMILY MEDICINE

## 2021-01-27 RX ORDER — ZOLPIDEM TARTRATE 5 MG/1
TABLET ORAL
Qty: 15 TABLET | Refills: 0 | Status: SHIPPED | OUTPATIENT
Start: 2021-01-27 | End: 2021-10-28 | Stop reason: SDUPTHER

## 2021-01-27 RX ORDER — ZOLPIDEM TARTRATE 5 MG/1
5 TABLET ORAL
Qty: 30 TABLET | Refills: 0 | Status: SHIPPED | OUTPATIENT
Start: 2021-01-27 | End: 2021-01-27

## 2021-01-27 RX ORDER — TRAMADOL HYDROCHLORIDE 50 MG/1
50 TABLET ORAL DAILY PRN
Qty: 30 TABLET | Refills: 0 | Status: SHIPPED | OUTPATIENT
Start: 2021-01-27 | End: 2021-07-28 | Stop reason: SDUPTHER

## 2021-01-27 NOTE — ASSESSMENT & PLAN NOTE
Lab Results   Component Value Date    HGBA1C 7 6 (A) 01/27/2021       -   Patient congratulated on her efforts to make dietary lifestyle changes to bring her A1c down from 13  Advised patient to continue with her dietary changes and to start to exercise as this will greatly lower her A1c   -  For now we will continue glipizide 5 mg once daily and Janumet 50/500 once daily   - HbA1c in 3 months

## 2021-01-27 NOTE — ASSESSMENT & PLAN NOTE
-  Has used Tylenol and NSAIDs without relief  - therefore, will provide just 30 days of tramadol 50 mg once daily severe pain

## 2021-01-27 NOTE — ASSESSMENT & PLAN NOTE
- will obtain lipid panel for next visit  Triglycerides have improved significantly to 400  Unsure if this is a combination of diet and the cessation of estrogen  However patient does want to reinitiate her estrogen  Therefore, will continue fenofibrate daily and dietary restrictions  - extensive dietary counseling given to the patient    -  Continue Crestor 20 mg daily

## 2021-01-27 NOTE — ASSESSMENT & PLAN NOTE
-   Patient is taken Ambien in the past and has worked  Patient reports she has been under a lot of stress with the illness of her father and her own medical conditions that she is not able to sleep well  Therefore will prescribe Ambien 5 mg HS for patient

## 2021-01-27 NOTE — ASSESSMENT & PLAN NOTE
Patient's blood pressure is controlled  Will continue spironolactone 100mg  B i d      Will discuss restarting lisinopril on follow-up visit     Patient denies any side effects with medications  Patient educated on the importance of weight loss, and appropriate dieting  Patient admits to be compliant with medications

## 2021-01-27 NOTE — PROGRESS NOTES
Assessment/Plan:    Type 2 diabetes mellitus without complication, without long-term current use of insulin ([facility] [address] )    Lab Results   Component Value Date    HGBA1C 7 6 (A) 01/27/2021       -   Patient congratulated on her efforts to make dietary lifestyle changes to bring her A1c down from 13  Advised patient to continue with her dietary changes and to start to exercise as this will greatly lower her A1c   -  For now we will continue glipizide 5 mg once daily and Janumet 50/500 once daily   - HbA1c in 3 months  Hypothyroidism  - continue levothyroxine 175 mcg once daily  -patient has its obtain labs advised to obtain TSH     HTN, goal below 140/90  Patient's blood pressure is controlled  Will continue spironolactone 100mg  B i d      Will discuss restarting lisinopril on follow-up visit     Patient denies any side effects with medications  Patient educated on the importance of weight loss, and appropriate dieting  Patient admits to be compliant with medications  Primary insomnia  -   Patient is taken Ambien in the past and has worked  Patient reports she has been under a lot of stress with the illness of her father and her own medical conditions that she is not able to sleep well  Therefore will prescribe Ambien 5 mg HS for patient  Hypertriglyceridemia  - will obtain lipid panel for next visit  Triglycerides have improved significantly to 400  Unsure if this is a combination of diet and the cessation of estrogen  However patient does want to reinitiate her estrogen  Therefore, will continue fenofibrate daily and dietary restrictions  - extensive dietary counseling given to the patient  -  Continue Crestor 20 mg daily    Chronic bilateral low back pain without sciatica  -  Has used Tylenol and NSAIDs without relief  - therefore, will provide just 30 days of tramadol 50 mg once daily severe pain           Diagnoses and all orders for this visit:    Type 2 diabetes mellitus without complication, without long-term current use of insulin ([facility])  -     POCT hemoglobin A1c  -     sitaGLIPtin-metFORMIN (JANUMET)  MG per tablet; Take 1 tablet by mouth 2 (two) times a day with meals    Encounter for immunization    Acquired hypothyroidism    HTN, goal below 140/90    Hypertriglyceridemia    Primary insomnia  -     zolpidem (AMBIEN) 5 mg tablet; Take 1 tablet (5 mg total) by mouth daily at bedtime as needed for sleep    Chronic pain of both knees    Chronic bilateral low back pain without sciatica  -     traMADol (ULTRAM) 50 mg tablet; Take 1 tablet (50 mg total) by mouth daily as needed for severe pain    Other orders  -     Cancel: influenza vaccine, quadrivalent, recombinant, PF, 0 5 mL, for patients 18 yr+ (FLUBLOK)          Subjective:      Patient ID: Angeles Collado is a 48 y o  adult  This is a pleasant 24-year-old female with no past medical history of type 2 diabetes, hypertension, hyperlipidemia, hypothyroidism some and hypertriglyceridemia who presents to the office today for follow-up visit her chronic conditions  Patient reports she has been doing well and has been compliant with lifestyle and dietary modifications we discussed on previous visits as well as her medications  She does admit that lately she has not been following diet and been eating a lot of take out because she has been very stressed out with the sickness of her father  She also reports that she has been not sleeping well due to this and would like something to help her sleep  She also states that because she has not been able to sleep well she has been having back pain for the last several weeks  The following portions of the patient's history were reviewed and updated as appropriate:   She  has a past medical history of Chronic pain of both knees (12/12/2019) and Seizure disorder ([facility] [address] )    She   Patient Active Problem List    Diagnosis Date Noted    Chronic bilateral low back pain without sciatica 01/27/2021    Primary insomnia 02/26/2020    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    HTN, goal below 140/90 10/15/2018    Tobacco abuse 09/26/2018    Hypertriglyceridemia 08/10/2018    Type 2 diabetes mellitus without complication, without long-term current use of insulin ([facility] [address] ) 08/10/2018    Hypothyroidism 10/15/2014    Adolescent or adult gender identity disorder 09/18/2013     She  has a past surgical history that includes Tonsillectomy  Her family history includes Cancer in her family; Depression in her family; Diabetes in her family; Hypertension in her family; Mental illness in her family; Stroke in her family  She  reports that she has been smoking cigarettes  She has been smoking about 0 50 packs per day  She has never used smokeless tobacco  She reports current alcohol use  She reports that she does not use drugs    Current Outpatient Medications   Medication Sig Dispense Refill    Aspirin Low Dose 81 MG EC tablet TAKE 1 TABLET BY MOUTH EVERY DAY 90 tablet 1    Blood Glucose Monitoring Suppl ([address]) w/Device KIT by Other route daily 1 kit 0    cholecalciferol (VITAMIN D3) 1,000 units tablet Take 1,000 Units by mouth daily      cyclobenzaprine (FLEXERIL) 10 mg tablet Take 1 tablet (10 mg total) by mouth 2 (two) times a day as needed for muscle spasms 20 tablet 0    estradiol (ESTRACE VAGINAL) 0 1 mg/g vaginal cream Insert into the vagina      estradiol valerate (DELESTROGEN) 40 MG/ML injection INJECT 0 5 ML (20 MG TOTAL) INTO A MUSCLE EVERY 14 (FOURTEEN) DAYS 15 mL 0    fenofibrate (TRICOR) 145 mg tablet TAKE 1 TABLET BY MOUTH EVERY DAY 90 tablet 1    glipiZIDE (GLUCOTROL XL) 5 mg 24 hr tablet Take 1 tablet (5 mg total) by mouth daily 30 tablet 3    glucose blood (OneTouch Verio) test strip 1 each by Other route daily Use as instructed 50 each 3    levothyroxine 175 mcg tablet Take 1 tablet (175 mcg total) by mouth daily 30 tablet 2    OneTouch Delica Lancets 23S MISC by Does not apply route daily 100 each 1    PREMARIN 1 25 MG tablet TAKE 2 TABLETS BY MOUTH TWICE DAILY 360 tablet 1    rosuvastatin (CRESTOR) 20 MG tablet TAKE 1 TABLET BY MOUTH EVERY DAY 90 tablet 1    SAFETY-LUCIANO 3CC SYR 21GX1 5" 21G X 1-1/2" 3 ML MISC USE AS DIRECTED 30 each 0    spironolactone (ALDACTONE) 100 mg tablet TAKE 1 TABLET TWICE DAILY  180 tablet 3    SYRINGE-NEEDLE, DISP, 3 ML (LUER LOCK SAFETY SYRINGES) 22G X 1-1/2" 3 ML MISC by Does not apply route every 14 (fourteen) days 20 each 3    traMADol (ULTRAM) 50 mg tablet Take 1 tablet (50 mg total) by mouth daily as needed for severe pain 30 tablet 0    conjugated estrogens (PREMARIN) vaginal cream Insert 1 g into the vagina 2 (two) times a day for 10 days 42 5 g 0    estradiol valerate (DELESTROGEN) 40 MG/ML injection Inject 1 mL (40 mg total) into a muscle every 14 (fourteen) days (Patient not taking: Reported on 11/2/2020) 5 mL 4    sitaGLIPtin-metFORMIN (JANUMET)  MG per tablet Take 1 tablet by mouth 2 (two) times a day with meals 60 tablet 3    zolpidem (AMBIEN) 5 mg tablet Take 1 tablet (5 mg total) by mouth daily at bedtime as needed for sleep 30 tablet 0     No current facility-administered medications for this visit       Review of Systems   Constitutional: Negative for fatigue and fever  HENT: Negative for congestion and sore throat  Eyes: Negative for visual disturbance  Respiratory: Negative for cough, shortness of breath and wheezing  Cardiovascular: Negative for chest pain, palpitations and leg swelling  Gastrointestinal: Negative for abdominal pain, anal bleeding, constipation, diarrhea, nausea and vomiting  Endocrine: Negative for cold intolerance and heat intolerance  Musculoskeletal: Positive for back pain  Negative for arthralgias and joint swelling  Skin: Negative for color change  Neurological: Negative for dizziness, seizures, syncope, weakness and light-headedness     Psychiatric/Behavioral: Negative for agitation, confusion, sleep disturbance and suicidal ideas  Objective:      /72 (BP Location: Left arm, Patient Position: Sitting, Cuff Size: Standard)   Pulse 88   Temp 97 8 °F (36 6 °C) (Temporal)   Resp 18   Ht 5' 10" (1 778 m)   Wt 89 4 kg (197 lb 1 6 oz)   BMI 28 28 kg/m²          Physical Exam  Constitutional:       Appearance: She is well-developed  HENT:      Head: Normocephalic and atraumatic  Eyes:      Conjunctiva/sclera: Conjunctivae normal       Pupils: Pupils are equal, round, and reactive to light  Neck:      Musculoskeletal: Normal range of motion and neck supple  Vascular: No JVD  Cardiovascular:      Rate and Rhythm: Normal rate and regular rhythm  Heart sounds: Normal heart sounds  No murmur  No friction rub  No gallop  Pulmonary:      Effort: Pulmonary effort is normal  No respiratory distress  Breath sounds: Normal breath sounds  No wheezing  Abdominal:      General: Bowel sounds are normal  There is no distension  Palpations: Abdomen is soft  Tenderness: There is no abdominal tenderness  Musculoskeletal: Normal range of motion  Lumbar back: She exhibits pain  She exhibits normal range of motion, no swelling, no edema, no deformity and no laceration  Skin:     General: Skin is warm and dry  Neurological:      Mental Status: She is alert and oriented to person, place, and time  Deep Tendon Reflexes: Reflexes are normal and symmetric  Psychiatric:         Behavior: Behavior normal          Thought Content:  Thought content normal          Judgment: Judgment normal

## 2021-01-28 ENCOUNTER — TELEPHONE (OUTPATIENT)
Dept: OBGYN CLINIC | Facility: CLINIC | Age: 51
End: 2021-01-28

## 2021-02-12 ENCOUNTER — CLINICAL SUPPORT (OUTPATIENT)
Dept: OBGYN CLINIC | Facility: CLINIC | Age: 51
End: 2021-02-12

## 2021-02-12 VITALS
SYSTOLIC BLOOD PRESSURE: 162 MMHG | HEIGHT: 70 IN | HEART RATE: 102 BPM | DIASTOLIC BLOOD PRESSURE: 88 MMHG | WEIGHT: 202 LBS | BODY MASS INDEX: 28.92 KG/M2

## 2021-02-12 DIAGNOSIS — R10.2 PELVIC PAIN: Primary | ICD-10-CM

## 2021-02-12 DIAGNOSIS — Z78.9 TRANSGENDER: ICD-10-CM

## 2021-02-12 LAB
SL AMB  POCT GLUCOSE, UA: NORMAL
SL AMB LEUKOCYTE ESTERASE,UA: NORMAL
SL AMB POCT BLOOD,UA: NORMAL
SL AMB POCT CLARITY,UA: NORMAL
SL AMB POCT COLOR,UA: NORMAL
SL AMB POCT KETONES,UA: NORMAL
SL AMB POCT URINE PROTEIN: NORMAL

## 2021-02-12 PROCEDURE — 3725F SCREEN DEPRESSION PERFORMED: CPT | Performed by: OBSTETRICS & GYNECOLOGY

## 2021-02-12 PROCEDURE — 3008F BODY MASS INDEX DOCD: CPT | Performed by: OBSTETRICS & GYNECOLOGY

## 2021-02-12 PROCEDURE — 99214 OFFICE O/P EST MOD 30 MIN: CPT | Performed by: OBSTETRICS & GYNECOLOGY

## 2021-02-12 RX ORDER — ESTRADIOL VALERATE 40 MG/ML
20 INJECTION INTRAMUSCULAR
Qty: 15 ML | Refills: 0 | Status: SHIPPED | OUTPATIENT
Start: 2021-02-12 | End: 2021-07-27

## 2021-02-12 RX ORDER — SYRINGE WITH NEEDLE, 1 ML 25GX5/8"
SYRINGE, EMPTY DISPOSABLE MISCELLANEOUS
Qty: 30 EACH | Refills: 0 | Status: SHIPPED | OUTPATIENT
Start: 2021-02-12 | End: 2021-04-09 | Stop reason: SDUPTHER

## 2021-02-12 NOTE — PROGRESS NOTES
This is a 45-year-old transgender female well known to me  She has currently stopped her estrogen for the last 2 months and would like to restart  She states that she has some lower abdominal pain and has noted some bleeding after intercourse  PMHx:  Type 2 diabetes currently taking Glucotrol hemoglobin A1c is 7 2                Hypertriglyceridemia ; currently being treated              Hypothyroidism currently being treated    Past surgical history as previously described    Vital signs stable    Breast:  Implant noted no masses or nipple discharge  Abdomen:  Positive bowel sounds, soft  Pelvic:  Status post SRS                Vaginal:  Some whitish discharge wet prep negative vaginal length 8 cm  No area of petechiae                Rectal exam:  Small prostate no nodularities noted    Assessment:  45-year-old transgender female with hypothyroidism type 2 diabetes and lipid abnormalities  Plan: 1  Estradiol 40 mg IM every 2 weeks, I discussed at length the potential of this increasing her triglycerides and the possible problems of pancreatitis  She is willing to take this risk and wishes to restart her estrogen  Needs to have lipids checked in the next 2-3 weeks to see if estradiol is causing a problem with triglycerides again  2  Will give some external estrogen cream to the vaginal introitus, order sent           3  Needs a mammogram and colonoscopy within the next 6 months

## 2021-02-18 DIAGNOSIS — Z23 ENCOUNTER FOR IMMUNIZATION: ICD-10-CM

## 2021-02-19 ENCOUNTER — CLINICAL SUPPORT (OUTPATIENT)
Dept: OBGYN CLINIC | Facility: CLINIC | Age: 51
End: 2021-02-19

## 2021-02-19 DIAGNOSIS — Z71.89 ENCOUNTER FOR INJECTION EDUCATION: Primary | ICD-10-CM

## 2021-02-19 PROCEDURE — 96372 THER/PROPH/DIAG INJ SC/IM: CPT

## 2021-02-26 ENCOUNTER — CLINICAL SUPPORT (OUTPATIENT)
Dept: OBGYN CLINIC | Facility: CLINIC | Age: 51
End: 2021-02-26

## 2021-02-26 ENCOUNTER — IMMUNIZATIONS (OUTPATIENT)
Dept: FAMILY MEDICINE CLINIC | Facility: HOSPITAL | Age: 51
End: 2021-02-26

## 2021-02-26 DIAGNOSIS — Z23 ENCOUNTER FOR IMMUNIZATION: Primary | ICD-10-CM

## 2021-02-26 DIAGNOSIS — Z78.9 TRANSGENDER: ICD-10-CM

## 2021-02-26 PROCEDURE — 96372 THER/PROPH/DIAG INJ SC/IM: CPT

## 2021-02-26 PROCEDURE — 0001A SARS-COV-2 / COVID-19 MRNA VACCINE (PFIZER-BIONTECH) 30 MCG: CPT

## 2021-02-26 PROCEDURE — 91300 SARS-COV-2 / COVID-19 MRNA VACCINE (PFIZER-BIONTECH) 30 MCG: CPT

## 2021-03-01 DIAGNOSIS — E11.9 TYPE 2 DIABETES MELLITUS WITHOUT COMPLICATION, WITHOUT LONG-TERM CURRENT USE OF INSULIN (HCC): ICD-10-CM

## 2021-03-01 DIAGNOSIS — Z78.9 MALE-TO-FEMALE TRANSGENDER PERSON: ICD-10-CM

## 2021-03-01 DIAGNOSIS — G44.209 TENSION HEADACHE: ICD-10-CM

## 2021-03-01 RX ORDER — GLIPIZIDE 5 MG/1
TABLET, FILM COATED, EXTENDED RELEASE ORAL
Qty: 90 TABLET | Refills: 1 | Status: SHIPPED | OUTPATIENT
Start: 2021-03-01 | End: 2021-07-28 | Stop reason: SDUPTHER

## 2021-03-01 RX ORDER — NAPROXEN 500 MG/1
TABLET ORAL
Qty: 30 TABLET | Refills: 0 | Status: SHIPPED | OUTPATIENT
Start: 2021-03-01 | End: 2021-07-26

## 2021-03-04 ENCOUNTER — TELEPHONE (OUTPATIENT)
Dept: OBGYN CLINIC | Facility: CLINIC | Age: 51
End: 2021-03-04

## 2021-03-05 ENCOUNTER — CLINICAL SUPPORT (OUTPATIENT)
Dept: OBGYN CLINIC | Facility: CLINIC | Age: 51
End: 2021-03-05

## 2021-03-05 DIAGNOSIS — Z78.9 TRANSGENDER: Primary | ICD-10-CM

## 2021-03-05 DIAGNOSIS — N89.8 VAGINAL ITCHING: Primary | ICD-10-CM

## 2021-03-05 DIAGNOSIS — N89.8 VAGINAL ITCHING: ICD-10-CM

## 2021-03-05 PROCEDURE — 96372 THER/PROPH/DIAG INJ SC/IM: CPT

## 2021-03-05 RX ORDER — ESTRADIOL VALERATE 20 MG/ML
20 INJECTION INTRAMUSCULAR WEEKLY
Status: DISCONTINUED | OUTPATIENT
Start: 2021-03-05 | End: 2022-02-18

## 2021-03-05 RX ORDER — SPIRONOLACTONE 100 MG/1
TABLET, FILM COATED ORAL
Qty: 180 TABLET | Refills: 3 | Status: SHIPPED | OUTPATIENT
Start: 2021-03-05 | End: 2021-10-28 | Stop reason: SDUPTHER

## 2021-03-05 RX ADMIN — ESTRADIOL VALERATE 20 MG: 20 INJECTION INTRAMUSCULAR at 10:30

## 2021-03-18 ENCOUNTER — IMMUNIZATIONS (OUTPATIENT)
Dept: FAMILY MEDICINE CLINIC | Facility: HOSPITAL | Age: 51
End: 2021-03-18

## 2021-03-18 DIAGNOSIS — Z23 ENCOUNTER FOR IMMUNIZATION: Primary | ICD-10-CM

## 2021-03-18 PROCEDURE — 0002A SARS-COV-2 / COVID-19 MRNA VACCINE (PFIZER-BIONTECH) 30 MCG: CPT

## 2021-03-18 PROCEDURE — 91300 SARS-COV-2 / COVID-19 MRNA VACCINE (PFIZER-BIONTECH) 30 MCG: CPT

## 2021-03-19 DIAGNOSIS — E03.8 OTHER SPECIFIED HYPOTHYROIDISM: ICD-10-CM

## 2021-03-19 DIAGNOSIS — G44.209 TENSION HEADACHE: ICD-10-CM

## 2021-03-19 RX ORDER — LEVOTHYROXINE SODIUM 175 UG/1
175 TABLET ORAL DAILY
Qty: 90 TABLET | Refills: 2 | Status: SHIPPED | OUTPATIENT
Start: 2021-03-19 | End: 2021-03-23

## 2021-03-19 RX ORDER — NAPROXEN 500 MG/1
TABLET ORAL
Qty: 30 TABLET | Refills: 0 | OUTPATIENT
Start: 2021-03-19

## 2021-03-23 ENCOUNTER — TELEPHONE (OUTPATIENT)
Dept: OBGYN CLINIC | Facility: CLINIC | Age: 51
End: 2021-03-23

## 2021-03-23 ENCOUNTER — TELEPHONE (OUTPATIENT)
Dept: FAMILY MEDICINE CLINIC | Facility: CLINIC | Age: 51
End: 2021-03-23

## 2021-03-23 DIAGNOSIS — E03.9 ACQUIRED HYPOTHYROIDISM: Primary | ICD-10-CM

## 2021-03-23 RX ORDER — LEVOTHYROXINE SODIUM 0.15 MG/1
1 TABLET ORAL DAILY
COMMUNITY
Start: 2021-03-23 | End: 2021-03-23 | Stop reason: SDUPTHER

## 2021-03-23 RX ORDER — LEVOTHYROXINE SODIUM 0.15 MG/1
150 TABLET ORAL DAILY
Qty: 90 TABLET | Refills: 0 | Status: SHIPPED | OUTPATIENT
Start: 2021-03-23 | End: 2021-03-23 | Stop reason: SDUPTHER

## 2021-03-23 RX ORDER — ESTRADIOL 0.1 MG/G
CREAM VAGINAL
Qty: 1 G | Refills: 0 | Status: SHIPPED | OUTPATIENT
Start: 2021-03-23 | End: 2021-03-26

## 2021-03-23 RX ORDER — LEVOTHYROXINE SODIUM 0.15 MG/1
150 TABLET ORAL DAILY
Qty: 90 TABLET | Refills: 0 | Status: SHIPPED | OUTPATIENT
Start: 2021-03-23 | End: 2021-06-21 | Stop reason: SDUPTHER

## 2021-03-23 NOTE — TELEPHONE ENCOUNTER
Patient presented to office asking for refill of levothyroxine 150 mcg daily  The 175 mcg daily dose was sent 3/19/21  She mentioned when she had taken 175 mcg in the past, she did not feel good  Instead she has been taking 150 mcg daily  I called [facility] at [phone] and spoke [place] [id number] who told me patient did refill levothyroxine 150 mcg on 9/17/20 for 90 days and again on 12/19/20 for 90 days  She has been out for the past 5 days  I called in another 90 days of 150 mcg daily (since patient told me this is more cost effective)  She mentioned she will be getting blood work one week before next office visit in late April 2021  I emphasized the importance of getting this blood work since last TSH was in February 2020  Patient expressed understanding and had all questions and concerns addressed

## 2021-03-23 NOTE — TELEPHONE ENCOUNTER
[facility] called stating there was no sig written for her Estradiol cream  Can you please send in an new script  Thank you !

## 2021-03-26 ENCOUNTER — CLINICAL SUPPORT (OUTPATIENT)
Dept: OBGYN CLINIC | Facility: CLINIC | Age: 51
End: 2021-03-26

## 2021-03-26 DIAGNOSIS — Z78.9 TRANSGENDER: Primary | ICD-10-CM

## 2021-03-26 PROCEDURE — 96372 THER/PROPH/DIAG INJ SC/IM: CPT

## 2021-03-26 RX ORDER — ESTRADIOL 0.1 MG/G
1 CREAM VAGINAL DAILY
Qty: 30 G | Refills: 2 | Status: SHIPPED | OUTPATIENT
Start: 2021-03-26 | End: 2022-02-18 | Stop reason: ALTCHOICE

## 2021-03-26 RX ADMIN — ESTRADIOL VALERATE 20 MG: 20 INJECTION INTRAMUSCULAR at 10:00

## 2021-04-02 ENCOUNTER — CLINICAL SUPPORT (OUTPATIENT)
Dept: OBGYN CLINIC | Facility: CLINIC | Age: 51
End: 2021-04-02

## 2021-04-02 DIAGNOSIS — F64.0: ICD-10-CM

## 2021-04-02 PROCEDURE — 96372 THER/PROPH/DIAG INJ SC/IM: CPT

## 2021-04-02 RX ADMIN — ESTRADIOL VALERATE 20 MG: 20 INJECTION INTRAMUSCULAR at 09:20

## 2021-04-09 ENCOUNTER — CLINICAL SUPPORT (OUTPATIENT)
Dept: OBGYN CLINIC | Facility: CLINIC | Age: 51
End: 2021-04-09

## 2021-04-09 DIAGNOSIS — Z78.9 TRANSGENDER: ICD-10-CM

## 2021-04-09 DIAGNOSIS — F64.0: ICD-10-CM

## 2021-04-09 PROCEDURE — 96372 THER/PROPH/DIAG INJ SC/IM: CPT

## 2021-04-09 RX ORDER — SYRINGE WITH NEEDLE, 1 ML 25GX5/8"
SYRINGE, EMPTY DISPOSABLE MISCELLANEOUS
Qty: 30 EACH | Refills: 0 | Status: SHIPPED | OUTPATIENT
Start: 2021-04-09 | End: 2022-02-18 | Stop reason: SDUPTHER

## 2021-04-09 RX ADMIN — ESTRADIOL VALERATE 20 MG: 20 INJECTION INTRAMUSCULAR at 11:45

## 2021-04-16 ENCOUNTER — CLINICAL SUPPORT (OUTPATIENT)
Dept: OBGYN CLINIC | Facility: CLINIC | Age: 51
End: 2021-04-16

## 2021-04-16 DIAGNOSIS — F64.0: ICD-10-CM

## 2021-04-16 PROCEDURE — 96372 THER/PROPH/DIAG INJ SC/IM: CPT

## 2021-04-16 RX ADMIN — ESTRADIOL VALERATE 20 MG: 20 INJECTION INTRAMUSCULAR at 10:30

## 2021-04-23 ENCOUNTER — CLINICAL SUPPORT (OUTPATIENT)
Dept: OBGYN CLINIC | Facility: CLINIC | Age: 51
End: 2021-04-23

## 2021-04-23 DIAGNOSIS — F64.0: ICD-10-CM

## 2021-04-23 PROCEDURE — 96372 THER/PROPH/DIAG INJ SC/IM: CPT

## 2021-04-23 RX ADMIN — ESTRADIOL VALERATE 20 MG: 20 INJECTION INTRAMUSCULAR at 10:19

## 2021-04-30 ENCOUNTER — CLINICAL SUPPORT (OUTPATIENT)
Dept: OBGYN CLINIC | Facility: CLINIC | Age: 51
End: 2021-04-30

## 2021-04-30 DIAGNOSIS — F64.0: ICD-10-CM

## 2021-04-30 PROCEDURE — 96372 THER/PROPH/DIAG INJ SC/IM: CPT

## 2021-04-30 RX ADMIN — ESTRADIOL VALERATE 20 MG: 20 INJECTION INTRAMUSCULAR at 11:58

## 2021-05-07 ENCOUNTER — CLINICAL SUPPORT (OUTPATIENT)
Dept: OBGYN CLINIC | Facility: CLINIC | Age: 51
End: 2021-05-07

## 2021-05-07 DIAGNOSIS — F64.0: ICD-10-CM

## 2021-05-07 PROCEDURE — 96372 THER/PROPH/DIAG INJ SC/IM: CPT

## 2021-05-07 RX ADMIN — ESTRADIOL VALERATE 20 MG: 20 INJECTION INTRAMUSCULAR at 11:15

## 2021-05-14 ENCOUNTER — CLINICAL SUPPORT (OUTPATIENT)
Dept: OBGYN CLINIC | Facility: CLINIC | Age: 51
End: 2021-05-14

## 2021-05-14 DIAGNOSIS — F64.0: ICD-10-CM

## 2021-05-14 PROCEDURE — 96372 THER/PROPH/DIAG INJ SC/IM: CPT

## 2021-05-14 RX ADMIN — ESTRADIOL VALERATE 20 MG: 20 INJECTION INTRAMUSCULAR at 11:56

## 2021-05-28 ENCOUNTER — CLINICAL SUPPORT (OUTPATIENT)
Dept: OBGYN CLINIC | Facility: CLINIC | Age: 51
End: 2021-05-28

## 2021-05-28 DIAGNOSIS — F64.0: ICD-10-CM

## 2021-05-28 PROCEDURE — 96372 THER/PROPH/DIAG INJ SC/IM: CPT

## 2021-05-28 RX ADMIN — ESTRADIOL VALERATE 20 MG: 20 INJECTION INTRAMUSCULAR at 11:28

## 2021-06-04 ENCOUNTER — CLINICAL SUPPORT (OUTPATIENT)
Dept: OBGYN CLINIC | Facility: CLINIC | Age: 51
End: 2021-06-04

## 2021-06-04 DIAGNOSIS — F64.0: ICD-10-CM

## 2021-06-04 PROCEDURE — 96372 THER/PROPH/DIAG INJ SC/IM: CPT

## 2021-06-04 RX ADMIN — ESTRADIOL VALERATE 20 MG: 20 INJECTION INTRAMUSCULAR at 10:00

## 2021-06-11 ENCOUNTER — CLINICAL SUPPORT (OUTPATIENT)
Dept: OBGYN CLINIC | Facility: CLINIC | Age: 51
End: 2021-06-11

## 2021-06-11 DIAGNOSIS — F64.0: Primary | ICD-10-CM

## 2021-06-11 PROCEDURE — 96372 THER/PROPH/DIAG INJ SC/IM: CPT

## 2021-06-11 RX ADMIN — ESTRADIOL VALERATE 20 MG: 20 INJECTION INTRAMUSCULAR at 10:26

## 2021-06-21 DIAGNOSIS — E03.9 ACQUIRED HYPOTHYROIDISM: ICD-10-CM

## 2021-06-21 RX ORDER — LEVOTHYROXINE SODIUM 0.15 MG/1
150 TABLET ORAL DAILY
Qty: 90 TABLET | Refills: 0 | Status: SHIPPED | OUTPATIENT
Start: 2021-06-21 | End: 2021-07-28 | Stop reason: SDUPTHER

## 2021-06-21 NOTE — TELEPHONE ENCOUNTER
Pt is requesting med refills on;    levothyroxine 150 mcg tablet      Pt would like to know if meds can be sent today since she is completley out of meds

## 2021-07-19 DIAGNOSIS — E11.9 TYPE 2 DIABETES MELLITUS WITHOUT COMPLICATION, WITHOUT LONG-TERM CURRENT USE OF INSULIN (HCC): ICD-10-CM

## 2021-07-19 DIAGNOSIS — E78.1 HYPERTRIGLYCERIDEMIA: ICD-10-CM

## 2021-07-19 RX ORDER — ROSUVASTATIN CALCIUM 20 MG/1
TABLET, COATED ORAL
Qty: 90 TABLET | Refills: 1 | Status: SHIPPED | OUTPATIENT
Start: 2021-07-19 | End: 2021-10-28 | Stop reason: SDUPTHER

## 2021-07-19 RX ORDER — ASPIRIN 81 MG/1
TABLET ORAL
Qty: 90 TABLET | Refills: 1 | Status: SHIPPED | OUTPATIENT
Start: 2021-07-19 | End: 2021-10-28 | Stop reason: SDUPTHER

## 2021-07-19 RX ORDER — FENOFIBRATE 145 MG/1
TABLET, COATED ORAL
Qty: 90 TABLET | Refills: 1 | Status: SHIPPED | OUTPATIENT
Start: 2021-07-19 | End: 2021-10-28 | Stop reason: SDUPTHER

## 2021-07-26 ENCOUNTER — OFFICE VISIT (OUTPATIENT)
Dept: FAMILY MEDICINE CLINIC | Facility: CLINIC | Age: 51
End: 2021-07-26

## 2021-07-26 VITALS
HEIGHT: 70 IN | BODY MASS INDEX: 29.49 KG/M2 | WEIGHT: 206 LBS | HEART RATE: 107 BPM | OXYGEN SATURATION: 97 % | DIASTOLIC BLOOD PRESSURE: 80 MMHG | SYSTOLIC BLOOD PRESSURE: 170 MMHG | RESPIRATION RATE: 16 BRPM | TEMPERATURE: 98 F

## 2021-07-26 DIAGNOSIS — M54.50 CHRONIC BILATERAL LOW BACK PAIN WITHOUT SCIATICA: ICD-10-CM

## 2021-07-26 DIAGNOSIS — M54.9 ACUTE BACK PAIN, UNSPECIFIED BACK LOCATION, UNSPECIFIED BACK PAIN LATERALITY: ICD-10-CM

## 2021-07-26 DIAGNOSIS — G44.209 TENSION HEADACHE: ICD-10-CM

## 2021-07-26 DIAGNOSIS — S09.90XA TRAUMATIC INJURY OF HEAD, INITIAL ENCOUNTER: Primary | ICD-10-CM

## 2021-07-26 DIAGNOSIS — G89.29 CHRONIC BILATERAL LOW BACK PAIN WITHOUT SCIATICA: ICD-10-CM

## 2021-07-26 PROCEDURE — 99213 OFFICE O/P EST LOW 20 MIN: CPT | Performed by: FAMILY MEDICINE

## 2021-07-26 RX ORDER — CYCLOBENZAPRINE HCL 10 MG
10 TABLET ORAL 2 TIMES DAILY PRN
Qty: 20 TABLET | Refills: 0 | Status: SHIPPED | OUTPATIENT
Start: 2021-07-26 | End: 2021-10-28

## 2021-07-26 RX ORDER — ACETAMINOPHEN 500 MG
500 TABLET ORAL EVERY 6 HOURS PRN
Qty: 30 TABLET | Refills: 0 | Status: SHIPPED | OUTPATIENT
Start: 2021-07-26

## 2021-07-26 RX ORDER — NAPROXEN 500 MG/1
TABLET ORAL
Qty: 30 TABLET | Refills: 0 | Status: SHIPPED | OUTPATIENT
Start: 2021-07-26 | End: 2021-08-09

## 2021-07-26 NOTE — PROGRESS NOTES
Assessment/Plan:    Head trauma  2/2 to falling of wooden board/menu sign at [employer]  Patient denies any LOC, nausea or vomiting subsequent to enduring trauma  Reports neck pain, likely referred or 2/2 muscle tension post-exposure  CT of head not recommended given normal findings on neuro exam     - Advised patient to take Tylenol 1000 mg Q8H for muscle pain  - Refilled Flexeril, which patient is taking for chronic back pain  - Patient advised to call office if symptoms persist or worsen over the next 2 weeks        No follow-ups on file  Diagnoses and all orders for this visit:    Traumatic injury of head, initial encounter  -     acetaminophen (TYLENOL) 500 mg tablet; Take 1 tablet (500 mg total) by mouth every 6 (six) hours as needed for mild pain    Acute back pain, unspecified back location, unspecified back pain laterality  -     cyclobenzaprine (FLEXERIL) 10 mg tablet; Take 1 tablet (10 mg total) by mouth 2 (two) times a day as needed for muscle spasms    Chronic bilateral low back pain without sciatica          Subjective:     Esther Hazel is a 48 y o  adult who  has a past medical history of Chronic pain of both knees and Seizure disorder ([facility] [address] )  who presented to the office today for trauma to head  HPI    Patient reports she was at [employer] and the sign fell on her head, knocked her over onto a nearby the table  No LOC  No loss of blood however reports she felt a bump on the vertex of her head  Denies nausea or vomiting after the incident  Review of Systems   Constitutional: Negative for fever and unexpected weight change  HENT: Negative for congestion and rhinorrhea  Eyes: Negative for visual disturbance  Respiratory: Negative for chest tightness and shortness of breath  Cardiovascular: Negative for chest pain and leg swelling  Gastrointestinal: Negative for abdominal distention, abdominal pain, constipation, diarrhea and nausea     Genitourinary: Negative for difficulty urinating  Musculoskeletal: Negative for arthralgias and myalgias  Neurological: Negative for dizziness, syncope and light-headedness  Psychiatric/Behavioral: Negative for agitation, dysphoric mood, hallucinations, self-injury and suicidal ideas  Objective:    /80 (BP Location: Left arm, Patient Position: Sitting, Cuff Size: Large)   Pulse (!) 107   Temp 98 °F (36 7 °C) (Temporal)   Resp 16   Ht 5' 10" (1 778 m)   Wt 93 4 kg (206 lb)   SpO2 97%   BMI 29 56 kg/m²     Physical Exam  HENT:      Head: Normocephalic  Right Ear: External ear normal       Left Ear: External ear normal    Eyes:      Extraocular Movements: Extraocular movements intact  Conjunctiva/sclera: Conjunctivae normal       Pupils: Pupils are equal, round, and reactive to light  Neck:      Comments:   Limited ROM and tenderness with lateral rotation of the neck, b/l  Cardiovascular:      Rate and Rhythm: Normal rate and regular rhythm  Heart sounds: Normal heart sounds  Pulmonary:      Effort: Pulmonary effort is normal       Breath sounds: Normal breath sounds  Musculoskeletal:         General: Normal range of motion  Cervical back: Tenderness (on palpation of paravertebral cervical spine) present  Neurological:      General: No focal deficit present  Mental Status: She is oriented to person, place, and time           Mustapha Dumont MD  07/27/21  5:38 PM

## 2021-07-27 PROBLEM — S09.90XA HEAD TRAUMA: Status: ACTIVE | Noted: 2021-07-27

## 2021-07-27 NOTE — PROGRESS NOTES
Assessment/Plan:    Type 2 diabetes mellitus without complication, without long-term current use of insulin ([facility])    Lab Results   Component Value Date    HGBA1C 7 9 (A) 07/28/2021     - Continue metformin 500mg BID   - Continue glipizide 5mg Q24hr  - Continue lifestyle and dietary modifications     Hypothyroidism  - continue levothyroxine 150 mcg once daily  -patient has its obtain labs advised to obtain TSH     HTN, goal below 140/90  Patient's blood pressure is controlled  Will continue spironolactone 100mg  B i d      Will discuss restarting lisinopril on follow-up visit     Patient denies any side effects with medications  Patient educated on the importance of weight loss, and appropriate dieting  Patient admits to be compliant with medications  Hypertriglyceridemia  - Continue fenofibrate and crestor QD  - Will repeat lipid panel     Head trauma  - Improved but continues to have pain  - Will order tramadol 50 daily PRN for breakthrough pain  - Continue flexeril 10mg PRN         Diagnoses and all orders for this visit:    Type 2 diabetes mellitus without complication, without long-term current use of insulin ([facility])  -     Basic metabolic panel; Future  -     Microalbumin / creatinine urine ratio  -     POCT hemoglobin A1c  -     glipiZIDE (GLUCOTROL XL) 5 mg 24 hr tablet; Take 1 tablet (5 mg total) by mouth daily  -     metFORMIN (GLUCOPHAGE) 500 mg tablet; Take 1 tablet (500 mg total) by mouth 2 (two) times a day with meals    Acquired hypothyroidism  -     TSH, 3rd generation; Future  -     levothyroxine 150 mcg tablet; Take 1 tablet (150 mcg total) by mouth daily    HTN, goal below 140/90    Hypertriglyceridemia  -     Lipid Panel with Direct LDL reflex; Future    Traumatic injury of head, initial encounter    Chronic bilateral low back pain without sciatica  -     traMADol (ULTRAM) 50 mg tablet;  Take 1 tablet (50 mg total) by mouth daily as needed for severe pain          Subjective:      Patient ID: Selina Brandon is a 48 y o  adult  This is a pleasant 51-year-old female with known past medical history of type 2 diabetes, hypertension, hyperlipidemia, hypothyroidism some and hypertriglyceridemia who presents to the office today for follow-up visit her chronic conditions  Patient reports she has been doing well and has been compliant with lifestyle and dietary modifications we discussed on previous visits as well as her medications  She does admit that lately she has not been following diet and been eating a lot of take out because she has been very stressed out with the recent passing of her father  She also reports that she continues to have pain in her head after being struck on the head by a wooden board at [employer]  Otherwise, no other complaints  The following portions of the patient's history were reviewed and updated as appropriate:   She  has a past medical history of Chronic pain of both knees (12/12/2019), Seizure disorder ([facility] [address] ), and 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)  She   Patient Active Problem List    Diagnosis Date Noted    Head trauma 07/27/2021    Chronic bilateral low back pain without sciatica 01/27/2021    Primary insomnia 02/26/2020    HTN, goal below 140/90 10/15/2018    Tobacco abuse 09/26/2018    Hypertriglyceridemia 08/10/2018    Type 2 diabetes mellitus without complication, without long-term current use of insulin ([facility] [address] ) 08/10/2018    Hypothyroidism 10/15/2014    Adolescent or adult gender identity disorder 09/18/2013     She  has a past surgical history that includes Tonsillectomy  Her family history includes Cancer in her family; Depression in her family; Diabetes in her family; Hypertension in her family; Mental illness in her family; Stroke in her family  She  reports that she has been smoking cigarettes  She has been smoking about 0 50 packs per day  She has never used smokeless tobacco  She reports current alcohol use   She reports that she does not use drugs   Current Outpatient Medications   Medication Sig Dispense Refill    acetaminophen (TYLENOL) 500 mg tablet Take 1 tablet (500 mg total) by mouth every 6 (six) hours as needed for mild pain 30 tablet 0    Aspirin Adult Low Strength 81 MG EC tablet TAKE 1 TABLET BY MOUTH EVERY DAY 90 tablet 1    Blood Glucose Monitoring Suppl ([address]) w/Device KIT by Other route daily 1 kit 0    cholecalciferol (VITAMIN D3) 1,000 units tablet Take 1,000 Units by mouth daily      cyclobenzaprine (FLEXERIL) 10 mg tablet Take 1 tablet (10 mg total) by mouth 2 (two) times a day as needed for muscle spasms 20 tablet 0    estradiol (ESTRACE) 0 1 mg/g vaginal cream Insert 1 g into the vagina daily 30 g 2    estradiol valerate (DELESTROGEN) 40 MG/ML injection Inject 0 5 mL (20 mg total) into a muscle every 14 (fourteen) days 15 mL 3    fenofibrate (TRICOR) 145 mg tablet TAKE 1 TABLET BY MOUTH EVERY DAY 90 tablet 1    glipiZIDE (GLUCOTROL XL) 5 mg 24 hr tablet Take 1 tablet (5 mg total) by mouth daily 90 tablet 2    glucose blood (OneTouch Verio) test strip 1 each by Other route daily Use as instructed 50 each 3    levothyroxine 150 mcg tablet Take 1 tablet (150 mcg total) by mouth daily 90 tablet 2    metFORMIN (GLUCOPHAGE) 500 mg tablet Take 1 tablet (500 mg total) by mouth 2 (two) times a day with meals 60 tablet 2    naproxen (NAPROSYN) 500 mg tablet TAKE 1 TABLET BY MOUTH TWICE A DAY FOR 5 DAYS 30 tablet 0    OneTouch Delica Lancets 40M MISC by Does not apply route daily 100 each 1    rosuvastatin (CRESTOR) 20 MG tablet TAKE 1 TABLET BY MOUTH EVERY DAY 90 tablet 1    spironolactone (ALDACTONE) 100 mg tablet TAKE 1 TABLET BY MOUTH TWICE A  tablet 3    SYRINGE-NEEDLE, DISP, 3 ML (SAFETY-LUCIANO 3CC SYR 21GX1 5") 21G X 1-1/2" 3 ML MISC Use as directed 30 each 0    traMADol (ULTRAM) 50 mg tablet Take 1 tablet (50 mg total) by mouth daily as needed for severe pain 15 tablet 0    zolpidem (AMBIEN) 5 mg tablet TAKE 1 TABLET BY MOUTH DAILY AT BEDTIME AS NEEDED FOR SLEEP 15 tablet 0     Current Facility-Administered Medications   Medication Dose Route Frequency Provider Last Rate Last Admin    estradiol valerate (DELESTROGEN) injection 20 mg  20 mg Intramuscular Weekly TOMY Schafer   20 mg at 06/11/21 1026     Review of Systems   Constitutional: Negative for fatigue and fever  HENT: Negative for congestion and sore throat  Eyes: Negative for visual disturbance  Respiratory: Negative for cough, shortness of breath and wheezing  Cardiovascular: Negative for chest pain, palpitations and leg swelling  Gastrointestinal: Negative for abdominal pain, anal bleeding, constipation, diarrhea, nausea and vomiting  Endocrine: Negative for cold intolerance and heat intolerance  Musculoskeletal: Negative for arthralgias and joint swelling  Skin: Negative for color change  Neurological: Positive for headaches  Negative for dizziness, seizures, syncope, weakness and light-headedness  Psychiatric/Behavioral: Negative for agitation, confusion, sleep disturbance and suicidal ideas  Objective:      /74 (BP Location: Left arm, Patient Position: Sitting, Cuff Size: Standard)   Temp 97 8 °F (36 6 °C) (Temporal)   Resp 18   Ht 5' 10" (1 778 m)   Wt 93 9 kg (207 lb)   BMI 29 70 kg/m²          Physical Exam  Constitutional:       Appearance: She is well-developed  HENT:      Head: Normocephalic and atraumatic  Eyes:      Conjunctiva/sclera: Conjunctivae normal       Pupils: Pupils are equal, round, and reactive to light  Neck:      Vascular: No JVD  Cardiovascular:      Rate and Rhythm: Normal rate and regular rhythm  Heart sounds: Normal heart sounds  No murmur heard  No friction rub  No gallop  Pulmonary:      Effort: Pulmonary effort is normal  No respiratory distress  Breath sounds: Normal breath sounds  No wheezing     Abdominal:      General: Bowel sounds are normal  There is no distension  Palpations: Abdomen is soft  Tenderness: There is no abdominal tenderness  Musculoskeletal:         General: Normal range of motion  Cervical back: Normal range of motion and neck supple  Skin:     General: Skin is warm and dry  Neurological:      Mental Status: She is alert and oriented to person, place, and time  Deep Tendon Reflexes: Reflexes are normal and symmetric  Psychiatric:         Behavior: Behavior normal          Thought Content:  Thought content normal          Judgment: Judgment normal

## 2021-07-27 NOTE — ASSESSMENT & PLAN NOTE
2/2 to falling of wooden board/menu sign at [employer]  Patient denies any LOC, nausea or vomiting subsequent to enduring trauma  Reports neck pain, likely referred or 2/2 muscle tension post-exposure   CT of head not recommended given normal findings on neuro exam     - Advised patient to take Tylenol 1000 mg Q8H for muscle pain  - Refilled Flexeril, which patient is taking for chronic back pain  - Patient advised to call office if symptoms persist or worsen over the next 2 weeks

## 2021-07-28 ENCOUNTER — OFFICE VISIT (OUTPATIENT)
Dept: FAMILY MEDICINE CLINIC | Facility: CLINIC | Age: 51
End: 2021-07-28

## 2021-07-28 VITALS
WEIGHT: 207 LBS | BODY MASS INDEX: 29.63 KG/M2 | SYSTOLIC BLOOD PRESSURE: 128 MMHG | HEIGHT: 70 IN | DIASTOLIC BLOOD PRESSURE: 74 MMHG | RESPIRATION RATE: 18 BRPM | TEMPERATURE: 97.8 F

## 2021-07-28 DIAGNOSIS — E78.1 HYPERTRIGLYCERIDEMIA: ICD-10-CM

## 2021-07-28 DIAGNOSIS — S09.90XA TRAUMATIC INJURY OF HEAD, INITIAL ENCOUNTER: ICD-10-CM

## 2021-07-28 DIAGNOSIS — M54.50 CHRONIC BILATERAL LOW BACK PAIN WITHOUT SCIATICA: ICD-10-CM

## 2021-07-28 DIAGNOSIS — G89.29 CHRONIC BILATERAL LOW BACK PAIN WITHOUT SCIATICA: ICD-10-CM

## 2021-07-28 DIAGNOSIS — E11.9 TYPE 2 DIABETES MELLITUS WITHOUT COMPLICATION, WITHOUT LONG-TERM CURRENT USE OF INSULIN (HCC): Primary | ICD-10-CM

## 2021-07-28 DIAGNOSIS — I10 HTN, GOAL BELOW 140/90: ICD-10-CM

## 2021-07-28 DIAGNOSIS — E03.9 ACQUIRED HYPOTHYROIDISM: ICD-10-CM

## 2021-07-28 PROBLEM — E75.5 XANTHOMA: Status: RESOLVED | Noted: 2019-06-03 | Resolved: 2021-07-28

## 2021-07-28 LAB — SL AMB POCT HEMOGLOBIN AIC: 7.9 (ref ?–6.5)

## 2021-07-28 PROCEDURE — 3008F BODY MASS INDEX DOCD: CPT | Performed by: FAMILY MEDICINE

## 2021-07-28 PROCEDURE — 99214 OFFICE O/P EST MOD 30 MIN: CPT | Performed by: FAMILY MEDICINE

## 2021-07-28 PROCEDURE — 83036 HEMOGLOBIN GLYCOSYLATED A1C: CPT | Performed by: FAMILY MEDICINE

## 2021-07-28 PROCEDURE — 3051F HG A1C>EQUAL 7.0%<8.0%: CPT | Performed by: FAMILY MEDICINE

## 2021-07-28 RX ORDER — GLIPIZIDE 5 MG/1
5 TABLET, FILM COATED, EXTENDED RELEASE ORAL DAILY
Qty: 90 TABLET | Refills: 2 | Status: SHIPPED | OUTPATIENT
Start: 2021-07-28 | End: 2021-10-28 | Stop reason: SDUPTHER

## 2021-07-28 RX ORDER — LEVOTHYROXINE SODIUM 0.15 MG/1
150 TABLET ORAL DAILY
Qty: 90 TABLET | Refills: 2 | Status: SHIPPED | OUTPATIENT
Start: 2021-07-28 | End: 2021-10-28 | Stop reason: SDUPTHER

## 2021-07-28 RX ORDER — TRAMADOL HYDROCHLORIDE 50 MG/1
50 TABLET ORAL DAILY PRN
Qty: 15 TABLET | Refills: 0 | Status: SHIPPED | OUTPATIENT
Start: 2021-07-28 | End: 2021-10-28 | Stop reason: SDUPTHER

## 2021-07-28 NOTE — ASSESSMENT & PLAN NOTE
- Improved but continues to have pain  - Will order tramadol 50 daily PRN for breakthrough pain  - Continue flexeril 10mg PRN

## 2021-07-28 NOTE — ASSESSMENT & PLAN NOTE
Lab Results   Component Value Date    HGBA1C 7 9 (A) 07/28/2021     - Continue metformin 500mg BID   - Continue glipizide 5mg Q24hr  - Continue lifestyle and dietary modifications

## 2021-08-07 DIAGNOSIS — G44.209 TENSION HEADACHE: ICD-10-CM

## 2021-08-09 RX ORDER — NAPROXEN 500 MG/1
TABLET ORAL
Qty: 30 TABLET | Refills: 0 | Status: SHIPPED | OUTPATIENT
Start: 2021-08-09 | End: 2022-01-26

## 2021-10-28 ENCOUNTER — OFFICE VISIT (OUTPATIENT)
Dept: FAMILY MEDICINE CLINIC | Facility: CLINIC | Age: 51
End: 2021-10-28

## 2021-10-28 VITALS
RESPIRATION RATE: 16 BRPM | HEART RATE: 90 BPM | TEMPERATURE: 98.3 F | WEIGHT: 207 LBS | OXYGEN SATURATION: 95 % | BODY MASS INDEX: 29.63 KG/M2 | HEIGHT: 70 IN | SYSTOLIC BLOOD PRESSURE: 146 MMHG | DIASTOLIC BLOOD PRESSURE: 84 MMHG

## 2021-10-28 DIAGNOSIS — E11.9 TYPE 2 DIABETES MELLITUS WITHOUT COMPLICATION, WITHOUT LONG-TERM CURRENT USE OF INSULIN (HCC): Primary | ICD-10-CM

## 2021-10-28 DIAGNOSIS — I10 HTN, GOAL BELOW 140/90: ICD-10-CM

## 2021-10-28 DIAGNOSIS — M54.50 CHRONIC BILATERAL LOW BACK PAIN WITHOUT SCIATICA: ICD-10-CM

## 2021-10-28 DIAGNOSIS — G89.29 CHRONIC BILATERAL LOW BACK PAIN WITHOUT SCIATICA: ICD-10-CM

## 2021-10-28 DIAGNOSIS — E03.9 ACQUIRED HYPOTHYROIDISM: ICD-10-CM

## 2021-10-28 DIAGNOSIS — S09.90XD TRAUMATIC INJURY OF HEAD, SUBSEQUENT ENCOUNTER: ICD-10-CM

## 2021-10-28 DIAGNOSIS — E78.1 HYPERTRIGLYCERIDEMIA: ICD-10-CM

## 2021-10-28 DIAGNOSIS — F51.01 PRIMARY INSOMNIA: ICD-10-CM

## 2021-10-28 DIAGNOSIS — Z78.9 MALE-TO-FEMALE TRANSGENDER PERSON: ICD-10-CM

## 2021-10-28 LAB — SL AMB POCT HEMOGLOBIN AIC: 7.7 (ref ?–6.5)

## 2021-10-28 PROCEDURE — 3008F BODY MASS INDEX DOCD: CPT | Performed by: FAMILY MEDICINE

## 2021-10-28 PROCEDURE — 3077F SYST BP >= 140 MM HG: CPT | Performed by: FAMILY MEDICINE

## 2021-10-28 PROCEDURE — 83036 HEMOGLOBIN GLYCOSYLATED A1C: CPT | Performed by: FAMILY MEDICINE

## 2021-10-28 PROCEDURE — 99214 OFFICE O/P EST MOD 30 MIN: CPT | Performed by: FAMILY MEDICINE

## 2021-10-28 PROCEDURE — 3079F DIAST BP 80-89 MM HG: CPT | Performed by: FAMILY MEDICINE

## 2021-10-28 PROCEDURE — 3051F HG A1C>EQUAL 7.0%<8.0%: CPT | Performed by: FAMILY MEDICINE

## 2021-10-28 RX ORDER — FENOFIBRATE 145 MG/1
145 TABLET, COATED ORAL DAILY
Qty: 90 TABLET | Refills: 1 | Status: SHIPPED | OUTPATIENT
Start: 2021-10-28 | End: 2022-04-13 | Stop reason: SDUPTHER

## 2021-10-28 RX ORDER — SPIRONOLACTONE 100 MG/1
100 TABLET, FILM COATED ORAL 2 TIMES DAILY
Qty: 180 TABLET | Refills: 3 | Status: CANCELLED | OUTPATIENT
Start: 2021-10-28

## 2021-10-28 RX ORDER — LEVOTHYROXINE SODIUM 0.15 MG/1
150 TABLET ORAL DAILY
Qty: 90 TABLET | Refills: 2 | Status: SHIPPED | OUTPATIENT
Start: 2021-10-28 | End: 2022-04-13 | Stop reason: SDUPTHER

## 2021-10-28 RX ORDER — SPIRONOLACTONE 100 MG/1
100 TABLET, FILM COATED ORAL 2 TIMES DAILY
Qty: 180 TABLET | Refills: 3 | Status: SHIPPED | OUTPATIENT
Start: 2021-10-28 | End: 2022-02-18 | Stop reason: SDUPTHER

## 2021-10-28 RX ORDER — ASPIRIN 81 MG/1
81 TABLET ORAL DAILY
Qty: 90 TABLET | Refills: 1 | Status: SHIPPED | OUTPATIENT
Start: 2021-10-28 | End: 2022-04-13 | Stop reason: SDUPTHER

## 2021-10-28 RX ORDER — ROSUVASTATIN CALCIUM 20 MG/1
20 TABLET, COATED ORAL DAILY
Qty: 90 TABLET | Refills: 1 | Status: SHIPPED | OUTPATIENT
Start: 2021-10-28 | End: 2022-04-13 | Stop reason: SDUPTHER

## 2021-10-28 RX ORDER — GLIPIZIDE 5 MG/1
5 TABLET, FILM COATED, EXTENDED RELEASE ORAL DAILY
Qty: 90 TABLET | Refills: 2 | Status: SHIPPED | OUTPATIENT
Start: 2021-10-28 | End: 2022-04-13 | Stop reason: SDUPTHER

## 2021-10-28 RX ORDER — TRAMADOL HYDROCHLORIDE 50 MG/1
50 TABLET ORAL DAILY PRN
Qty: 15 TABLET | Refills: 0 | Status: SHIPPED | OUTPATIENT
Start: 2021-10-28 | End: 2022-04-13 | Stop reason: SDUPTHER

## 2021-10-28 RX ORDER — ZOLPIDEM TARTRATE 5 MG/1
5 TABLET ORAL
Qty: 15 TABLET | Refills: 0 | Status: SHIPPED | OUTPATIENT
Start: 2021-10-28 | End: 2022-04-13 | Stop reason: SDUPTHER

## 2021-12-22 ENCOUNTER — TELEPHONE (OUTPATIENT)
Dept: FAMILY MEDICINE CLINIC | Facility: CLINIC | Age: 51
End: 2021-12-22

## 2022-01-05 ENCOUNTER — TELEPHONE (OUTPATIENT)
Dept: FAMILY MEDICINE CLINIC | Facility: CLINIC | Age: 52
End: 2022-01-05

## 2022-01-05 NOTE — TELEPHONE ENCOUNTER
lvm for pt to return phone call and reschedule appt on 1/13//22  Dr Juan Luis Mesa will be unavailable   If pt return phone call please assist with rescheduling

## 2022-01-07 NOTE — TELEPHONE ENCOUNTER
pt requested to cancel the visit try to put her with someone else she stated no, she said she has been with [facility] for awhile and was unable to wait until March which is his next available visit  she stated that she will find another doctor

## 2022-01-07 NOTE — TELEPHONE ENCOUNTER
2nd attempt  lvm for pt to return phone call and reschedule appt on 1/13//22  Dr Obdulia Jamison will be unavailable   If pt return phone call please assist with rescheduling

## 2022-01-18 ENCOUNTER — OFFICE VISIT (OUTPATIENT)
Dept: FAMILY MEDICINE CLINIC | Facility: CLINIC | Age: 52
End: 2022-01-18

## 2022-01-18 DIAGNOSIS — B34.9 VIRAL INFECTION, UNSPECIFIED: Primary | ICD-10-CM

## 2022-01-18 PROCEDURE — 99442 PR PHYS/QHP TELEPHONE EVALUATION 11-20 MIN: CPT | Performed by: PHYSICIAN ASSISTANT

## 2022-01-18 RX ORDER — AZITHROMYCIN 250 MG/1
TABLET, FILM COATED ORAL
Qty: 6 TABLET | Refills: 0 | Status: SHIPPED | OUTPATIENT
Start: 2022-01-18 | End: 2022-01-23

## 2022-01-18 RX ORDER — GUAIFENESIN/DEXTROMETHORPHAN 100-10MG/5
5 SYRUP ORAL 3 TIMES DAILY PRN
Qty: 354 ML | Refills: 0 | Status: SHIPPED | OUTPATIENT
Start: 2022-01-18 | End: 2022-04-13

## 2022-01-18 RX ORDER — BENZONATATE 100 MG/1
100 CAPSULE ORAL 3 TIMES DAILY PRN
Qty: 30 CAPSULE | Refills: 0 | Status: SHIPPED | OUTPATIENT
Start: 2022-01-18 | End: 2022-04-13

## 2022-01-18 NOTE — PROGRESS NOTES
COVID-19 Outpatient Progress Note    Assessment/Plan:    Problem List Items Addressed This Visit     None      Visit Diagnoses     Viral infection, unspecified    -  Primary    Relevant Medications    azithromycin (Zithromax) 250 mg tablet    dextromethorphan-guaiFENesin (ROBITUSSIN DM)  mg/5 mL syrup    benzonatate (TESSALON PERLES) 100 mg capsule    Other Relevant Orders    COVID Only - Office Collect         Disposition:     Recommended patient to come to the office to test for COVID-19      - Patient is a smoker and does experience frequent episodes of bronchitis  Will prescribe Z-Martínez   - Continue Tylenol as needed for fevers  - Advised patient to self quarantine at home until test is resulted  - Advised patient to continue to follow good infection control measures including frequent hand washing, wearing a mask when around others, and to practice social distancing     - Advised patient to call the office or report to ED if symptoms persist, worsen, or new symptoms arise such as worsening shortness of breath or difficulty breathing  I have spent 18 minutes directly with the patient  Greater than 50% of this time was spent in counseling/coordination of care regarding: patient and family education and risk factor reductions  Encounter provider Deeanna Apley, PA-C    Provider located at [address] [address] [phone]    Recent Visits  Date Type Provider Dept   01/18/22 Office Visit MONTY Varela   Showing recent visits within past 7 days and meeting all other requirements  Future Appointments  No visits were found meeting these conditions  Showing future appointments within next 150 days and meeting all other requirements     This virtual check-in was done via telephone and she agrees to proceed      Patient agrees to participate in a virtual check in via telephone or video visit instead of presenting to the office to address urgent/immediate medical needs  Patient is aware this is a billable service  After connecting through Telephone, the patient was identified by name and date of birth  Jessica Damon was informed that this was a telemedicine visit and that the exam was being conducted confidentially over secure lines  My office door was closed  No one else was in the room  Jessica Damon acknowledged consent and understanding of privacy and security of the telemedicine visit  I informed the patient that I have reviewed her record in Epic and presented the opportunity for her to ask any questions regarding the visit today  The patient agreed to participate  It was my intent to perform this visit via video technology but the patient was not able to do a video connection so the visit was completed via audio telephone only  Verification of patient location:  Patient is located in the following state in which I hold an active license: [place]    Subjective:   Jessica Damon is a 46 y o  adult who is concerned about COVID-19  Patient's symptoms include fever, malaise, nasal congestion, rhinorrhea, cough, chest tightness and myalgias  Patient denies chills, fatigue, sore throat, anosmia, loss of taste, shortness of breath, abdominal pain, nausea, vomiting, diarrhea and headaches       - Date of symptom onset: 1/15/2022      COVID-19 vaccination status: Fully vaccinated with booster    Exposure:   Contact with a person who is under investigation (PUI) for or who is positive for COVID-19 within the last 14 days?: No    Hospitalized recently for fever and/or lower respiratory symptoms?: No      Currently a healthcare worker that is involved in direct patient care?: No      Works in a special setting where the risk of COVID-19 transmission may be high? (this may include long-term care, correctional and nursing home facilities; homeless shelters; assisted-living facilities and group homes ): No Resident in a special setting where the risk of COVID-19 transmission may be high? 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  Lab Results   Component Value Date    SARSCOV2 Not Detected 04/06/2020     Past Medical History:   Diagnosis Date    Chronic pain of both knees 12/12/2019    Seizure disorder ([facility] [address] )     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     Past Surgical History:   Procedure Laterality Date    TONSILLECTOMY       Current Outpatient Medications   Medication Sig Dispense Refill    acetaminophen (TYLENOL) 500 mg tablet Take 1 tablet (500 mg total) by mouth every 6 (six) hours as needed for mild pain 30 tablet 0    aspirin (Aspirin Adult Low Strength) 81 mg EC tablet Take 1 tablet (81 mg total) by mouth daily 90 tablet 1    azithromycin (Zithromax) 250 mg tablet Take 2 tablets (500 mg total) by mouth daily for 1 day, THEN 1 tablet (250 mg total) daily for 4 days   6 tablet 0    benzonatate (TESSALON PERLES) 100 mg capsule Take 1 capsule (100 mg total) by mouth 3 (three) times a day as needed for cough 30 capsule 0    Blood Glucose Monitoring Suppl ([address]) w/Device KIT by Other route daily 1 kit 0    cholecalciferol (VITAMIN D3) 1,000 units tablet Take 1,000 Units by mouth daily      dextromethorphan-guaiFENesin (ROBITUSSIN DM)  mg/5 mL syrup Take 5 mL by mouth 3 (three) times a day as needed for cough or congestion 354 mL 0    estradiol (ESTRACE) 0 1 mg/g vaginal cream Insert 1 g into the vagina daily 30 g 2  estradiol valerate (DELESTROGEN) 40 MG/ML injection Inject 0 5 mL (20 mg total) into a muscle every 14 (fourteen) days 15 mL 3    fenofibrate (TRICOR) 145 mg tablet Take 1 tablet (145 mg total) by mouth daily 90 tablet 1    glipiZIDE (GLUCOTROL XL) 5 mg 24 hr tablet Take 1 tablet (5 mg total) by mouth daily 90 tablet 2    glucose blood (OneTouch Verio) test strip 1 each by Other route daily Use as instructed 50 each 3    levothyroxine 150 mcg tablet Take 1 tablet (150 mcg total) by mouth daily 90 tablet 2    metFORMIN (GLUCOPHAGE) 500 mg tablet Take 1 tablet (500 mg total) by mouth 2 (two) times a day with meals 60 tablet 2    naproxen (NAPROSYN) 500 mg tablet TAKE 1 TABLET BY MOUTH TWICE A DAY FOR 5 DAYS 30 tablet 0    OneTouch Delica Lancets 57W MISC by Does not apply route daily 100 each 1    rosuvastatin (CRESTOR) 20 MG tablet Take 1 tablet (20 mg total) by mouth daily 90 tablet 1    spironolactone (ALDACTONE) 100 mg tablet Take 1 tablet (100 mg total) by mouth 2 (two) times a day 180 tablet 3    SYRINGE-NEEDLE, DISP, 3 ML (SAFETY-LUCIANO 3CC SYR 21GX1 5") 21G X 1-1/2" 3 ML MISC Use as directed 30 each 0    traMADol (ULTRAM) 50 mg tablet Take 1 tablet (50 mg total) by mouth daily as needed for severe pain 15 tablet 0    zolpidem (AMBIEN) 5 mg tablet Take 1 tablet (5 mg total) by mouth daily at bedtime as needed for sleep 15 tablet 0     Current Facility-Administered Medications   Medication Dose Route Frequency Provider Last Rate Last Admin    estradiol valerate (DELESTROGEN) injection 20 mg  20 mg Intramuscular Weekly TOMY Schafer   20 mg at 06/11/21 1026     Allergies   Allergen Reactions    Bee Venom Anaphylaxis    Penicillins Seizures    Pineapple - Food Allergy Anaphylaxis    Keflex [Cephalexin] GI Intolerance    Latex Hives    Other        Review of Systems   Constitutional: Positive for fever  Negative for chills and fatigue  HENT: Positive for congestion and rhinorrhea  Negative for sore throat  Respiratory: Positive for cough and chest tightness  Negative for shortness of breath  Gastrointestinal: Negative for abdominal pain, diarrhea, nausea and vomiting  Musculoskeletal: Positive for myalgias  Neurological: Negative for headaches  Objective: There were no vitals filed for this visit  Physical Exam  Constitutional:       General: She is not in acute distress  Pulmonary:      Effort: Pulmonary effort is normal  No respiratory distress  Neurological:      Mental Status: She is alert and oriented to person, place, and time  Psychiatric:         Speech: Speech normal          VIRTUAL VISIT DISCLAIMER    Jack Johnson Kellee 83 verbally agrees to participate in [employer]  Pt is aware that [employer] could be limited without vital signs or the ability to perform a full hands-on physical exam  Jo Pantojae understands she or the provider may request at any time to terminate the video visit and request the patient to seek care or treatment in person

## 2022-01-19 PROCEDURE — U0005 INFEC AGEN DETEC AMPLI PROBE: HCPCS | Performed by: PHYSICIAN ASSISTANT

## 2022-01-19 PROCEDURE — U0003 INFECTIOUS AGENT DETECTION BY NUCLEIC ACID (DNA OR RNA); SEVERE ACUTE RESPIRATORY SYNDROME CORONAVIRUS 2 (SARS-COV-2) (CORONAVIRUS DISEASE [COVID-19]), AMPLIFIED PROBE TECHNIQUE, MAKING USE OF HIGH THROUGHPUT TECHNOLOGIES AS DESCRIBED BY CMS-2020-01-R: HCPCS | Performed by: PHYSICIAN ASSISTANT

## 2022-01-20 LAB — SARS-COV-2 RNA RESP QL NAA+PROBE: NEGATIVE

## 2022-01-25 DIAGNOSIS — G44.209 TENSION HEADACHE: ICD-10-CM

## 2022-01-26 RX ORDER — NAPROXEN 500 MG/1
TABLET ORAL
Qty: 30 TABLET | Refills: 0 | Status: SHIPPED | OUTPATIENT
Start: 2022-01-26

## 2022-02-17 NOTE — PROGRESS NOTES
Continue this is a 51-year-old transgender female well known to me  She is here for annual exam      She denies any current problems with hormone therapy  She is receiving care from her primary care physician  She is currently not sexually active  She states that she is in a sfe enviroment  PMHx;  elevated TG  DM  HTN  Hypothy  HTN    Exam  Heent wnl  Breast: implants intact no masses  Abdomen: Soft +bs  GYN: NEFG, surgical created            Vag: decrease caliber, 12 cm lenghth            Prostate nl through vaginal exam  A:  Urine rigoberto in you when complains    P:  1  Estrogen therpy: In discussion was home with chuck about the risks of her current estrogen therapy on her liver especially her triglycerides  She appears to be willing to take that risk may however will require close monitoring  2  Will schedule mammogram at next visit in 6 months and rescreen lipids

## 2022-02-18 ENCOUNTER — ANNUAL EXAM (OUTPATIENT)
Dept: OBGYN CLINIC | Facility: CLINIC | Age: 52
End: 2022-02-18

## 2022-02-18 VITALS
BODY MASS INDEX: 31.07 KG/M2 | HEIGHT: 70 IN | DIASTOLIC BLOOD PRESSURE: 79 MMHG | SYSTOLIC BLOOD PRESSURE: 161 MMHG | WEIGHT: 217 LBS | HEART RATE: 92 BPM

## 2022-02-18 DIAGNOSIS — Z78.9 TRANSGENDER: Primary | ICD-10-CM

## 2022-02-18 DIAGNOSIS — Z78.9 MALE-TO-FEMALE TRANSGENDER PERSON: ICD-10-CM

## 2022-02-18 PROCEDURE — 3008F BODY MASS INDEX DOCD: CPT | Performed by: OBSTETRICS & GYNECOLOGY

## 2022-02-18 PROCEDURE — 3078F DIAST BP <80 MM HG: CPT | Performed by: OBSTETRICS & GYNECOLOGY

## 2022-02-18 PROCEDURE — 3725F SCREEN DEPRESSION PERFORMED: CPT | Performed by: OBSTETRICS & GYNECOLOGY

## 2022-02-18 PROCEDURE — 99214 OFFICE O/P EST MOD 30 MIN: CPT | Performed by: OBSTETRICS & GYNECOLOGY

## 2022-02-18 PROCEDURE — 3077F SYST BP >= 140 MM HG: CPT | Performed by: OBSTETRICS & GYNECOLOGY

## 2022-02-18 RX ORDER — ESTRADIOL VALERATE 40 MG/ML
20 INJECTION INTRAMUSCULAR
Qty: 15 ML | Refills: 3 | Status: SHIPPED | OUTPATIENT
Start: 2022-02-18

## 2022-02-18 RX ORDER — SYRINGE WITH NEEDLE, 1 ML 25GX5/8"
SYRINGE, EMPTY DISPOSABLE MISCELLANEOUS
Qty: 30 EACH | Refills: 0 | Status: SHIPPED | OUTPATIENT
Start: 2022-02-18

## 2022-02-18 RX ORDER — SPIRONOLACTONE 100 MG/1
100 TABLET, FILM COATED ORAL 2 TIMES DAILY
Qty: 180 TABLET | Refills: 3 | Status: SHIPPED | OUTPATIENT
Start: 2022-02-18

## 2022-04-13 ENCOUNTER — OFFICE VISIT (OUTPATIENT)
Dept: FAMILY MEDICINE CLINIC | Facility: CLINIC | Age: 52
End: 2022-04-13

## 2022-04-13 VITALS
TEMPERATURE: 99.4 F | DIASTOLIC BLOOD PRESSURE: 76 MMHG | HEIGHT: 70 IN | SYSTOLIC BLOOD PRESSURE: 138 MMHG | RESPIRATION RATE: 18 BRPM | WEIGHT: 215 LBS | BODY MASS INDEX: 30.78 KG/M2

## 2022-04-13 DIAGNOSIS — M54.50 CHRONIC BILATERAL LOW BACK PAIN WITHOUT SCIATICA: ICD-10-CM

## 2022-04-13 DIAGNOSIS — G89.29 CHRONIC BILATERAL LOW BACK PAIN WITHOUT SCIATICA: ICD-10-CM

## 2022-04-13 DIAGNOSIS — E78.1 HYPERTRIGLYCERIDEMIA: ICD-10-CM

## 2022-04-13 DIAGNOSIS — Z12.11 SCREENING FOR COLON CANCER: ICD-10-CM

## 2022-04-13 DIAGNOSIS — I10 HTN, GOAL BELOW 140/90: ICD-10-CM

## 2022-04-13 DIAGNOSIS — E11.9 TYPE 2 DIABETES MELLITUS WITHOUT COMPLICATION, WITHOUT LONG-TERM CURRENT USE OF INSULIN (HCC): Primary | ICD-10-CM

## 2022-04-13 DIAGNOSIS — E03.9 ACQUIRED HYPOTHYROIDISM: ICD-10-CM

## 2022-04-13 DIAGNOSIS — F51.01 PRIMARY INSOMNIA: ICD-10-CM

## 2022-04-13 DIAGNOSIS — Z00.00 ANNUAL PHYSICAL EXAM: ICD-10-CM

## 2022-04-13 PROBLEM — S09.90XA HEAD TRAUMA: Status: RESOLVED | Noted: 2021-07-27 | Resolved: 2022-04-13

## 2022-04-13 LAB — SL AMB POCT HEMOGLOBIN AIC: 8.5 (ref ?–6.5)

## 2022-04-13 PROCEDURE — 3078F DIAST BP <80 MM HG: CPT | Performed by: FAMILY MEDICINE

## 2022-04-13 PROCEDURE — 3075F SYST BP GE 130 - 139MM HG: CPT | Performed by: FAMILY MEDICINE

## 2022-04-13 PROCEDURE — 3725F SCREEN DEPRESSION PERFORMED: CPT | Performed by: FAMILY MEDICINE

## 2022-04-13 PROCEDURE — 83036 HEMOGLOBIN GLYCOSYLATED A1C: CPT | Performed by: FAMILY MEDICINE

## 2022-04-13 PROCEDURE — 99396 PREV VISIT EST AGE 40-64: CPT | Performed by: FAMILY MEDICINE

## 2022-04-13 PROCEDURE — 3052F HG A1C>EQUAL 8.0%<EQUAL 9.0%: CPT | Performed by: FAMILY MEDICINE

## 2022-04-13 PROCEDURE — 3008F BODY MASS INDEX DOCD: CPT | Performed by: FAMILY MEDICINE

## 2022-04-13 RX ORDER — ZOLPIDEM TARTRATE 5 MG/1
5 TABLET ORAL
Qty: 30 TABLET | Refills: 0 | Status: SHIPPED | OUTPATIENT
Start: 2022-04-13

## 2022-04-13 RX ORDER — LEVOTHYROXINE SODIUM 0.15 MG/1
150 TABLET ORAL DAILY
Qty: 90 TABLET | Refills: 2 | Status: SHIPPED | OUTPATIENT
Start: 2022-04-13

## 2022-04-13 RX ORDER — TRAMADOL HYDROCHLORIDE 50 MG/1
50 TABLET ORAL DAILY PRN
Qty: 15 TABLET | Refills: 0 | Status: SHIPPED | OUTPATIENT
Start: 2022-04-13

## 2022-04-13 RX ORDER — FENOFIBRATE 145 MG/1
145 TABLET, COATED ORAL DAILY
Qty: 90 TABLET | Refills: 1 | Status: SHIPPED | OUTPATIENT
Start: 2022-04-13

## 2022-04-13 RX ORDER — ROSUVASTATIN CALCIUM 20 MG/1
20 TABLET, COATED ORAL DAILY
Qty: 90 TABLET | Refills: 1 | Status: SHIPPED | OUTPATIENT
Start: 2022-04-13

## 2022-04-13 RX ORDER — GLIPIZIDE 5 MG/1
5 TABLET, FILM COATED, EXTENDED RELEASE ORAL DAILY
Qty: 90 TABLET | Refills: 2 | Status: SHIPPED | OUTPATIENT
Start: 2022-04-13

## 2022-04-13 RX ORDER — ASPIRIN 81 MG/1
81 TABLET ORAL DAILY
Qty: 90 TABLET | Refills: 1 | Status: SHIPPED | OUTPATIENT
Start: 2022-04-13

## 2022-04-13 NOTE — PROGRESS NOTES
Assessment/Plan:    HTN, goal below 140/90  Patient's blood pressure is controlled  Will continue spironolactone 100mg  B i d      Will discuss restarting lisinopril on follow-up visit     Patient denies any side effects with medications  Patient educated on the importance of weight loss, and appropriate dieting  Patient admits to be compliant with medications  Hypothyroidism  - continue levothyroxine 150 mcg once daily  - advised to obtain TSH  Type 2 diabetes mellitus without complication, without long-term current use of insulin ([facility])    Lab Results   Component Value Date    HGBA1C 8 5 (A) 04/13/2022       - Continue metformin 500mg BID   - Continue glipizide 5mg Q24hr  - Continue lifestyle and dietary modifications     Hypertriglyceridemia  - Continue fenofibrate and crestor QD  - Will repeat lipid panel     Primary insomnia  - Will restart ambien 5 mg HS    Annual physical exam  - Screenings and vaccinations reviewed with patient  Chronic bilateral low back pain without sciatica  - Will provide a short course of tramadol as NSAIDs and tylenol have not provided relief  Diagnoses and all orders for this visit:    Type 2 diabetes mellitus without complication, without long-term current use of insulin ([facility])  -     POCT hemoglobin A1c  -     metFORMIN (GLUCOPHAGE) 500 mg tablet; Take 1 tablet (500 mg total) by mouth 2 (two) times a day with meals  -     glipiZIDE (GLUCOTROL XL) 5 mg 24 hr tablet; Take 1 tablet (5 mg total) by mouth daily  -     aspirin (Aspirin Adult Low Strength) 81 mg EC tablet; Take 1 tablet (81 mg total) by mouth daily    HTN, goal below 140/90    Annual physical exam    Hypertriglyceridemia  -     rosuvastatin (CRESTOR) 20 MG tablet; Take 1 tablet (20 mg total) by mouth daily  -     fenofibrate (TRICOR) 145 mg tablet; Take 1 tablet (145 mg total) by mouth daily    Primary insomnia  -     zolpidem (AMBIEN) 5 mg tablet;  Take 1 tablet (5 mg total) by mouth daily at bedtime as needed for sleep    Acquired hypothyroidism  -     levothyroxine 150 mcg tablet; Take 1 tablet (150 mcg total) by mouth daily    Screening for colon cancer  -     Occult Blood, Fecal Immunochemical; Future    Chronic bilateral low back pain without sciatica  -     traMADol (ULTRAM) 50 mg tablet; Take 1 tablet (50 mg total) by mouth daily as needed for severe pain          Subjective:      Patient ID: Lawson Alcantar is a 46 y o  adult  This is a very pleasant 41-year-old transgender female with past medical history of type 2 diabetes mellitus, hypertension, hyperlipidemia and hypothyroidism who presents to the office today for follow-up of her chronic conditions  Patient reports that recently she has been undergoing significant amount of stress due to family and personal issues  Therefore, she has not been able to adhere to her dietary and lifestyle modifications discussed on previous visits  However, she does report that she has been compliant with her medications  She does admit that she forgot to obtain her labs but will obtain home with her next appointment  She does report continued back pain and states that she has been having some trouble sleeping due to all the stress that she is enduring in her personal life  Otherwise, no other complaints  The following portions of the patient's history were reviewed and updated as appropriate:   She  has a past medical history of Chronic pain of both knees (12/12/2019), Diabetes mellitus ([address] ), Seizure disorder ([address] ), and 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)    She   Patient Active Problem List    Diagnosis Date Noted    Annual physical exam 04/13/2022    Chronic bilateral low back pain without sciatica 01/27/2021    Primary insomnia 02/26/2020    HTN, goal below 140/90 10/15/2018    Tobacco abuse 09/26/2018    Hypertriglyceridemia 08/10/2018    Type 2 diabetes mellitus without complication, without long-term current use of insulin ([address] ) 08/10/2018    Hypothyroidism 10/15/2014    Adolescent or adult gender identity disorder 09/18/2013     She  has a past surgical history that includes Tonsillectomy and Breast surgery  Her family history includes Cancer in her family; Depression in her family; Diabetes in her family; Hypertension in her family; Mental illness in her family; Stroke in her family  She  reports that she has been smoking cigarettes  She has been smoking about 0 50 packs per day  She has never used smokeless tobacco  She reports current alcohol use of about 1 0 standard drink of alcohol per week  She reports that she does not use drugs    Current Outpatient Medications   Medication Sig Dispense Refill    acetaminophen (TYLENOL) 500 mg tablet Take 1 tablet (500 mg total) by mouth every 6 (six) hours as needed for mild pain 30 tablet 0    aspirin (Aspirin Adult Low Strength) 81 mg EC tablet Take 1 tablet (81 mg total) by mouth daily 90 tablet 1    Blood Glucose Monitoring Suppl ([address]) w/Device KIT by Other route daily 1 kit 0    cholecalciferol (VITAMIN D3) 1,000 units tablet Take 1,000 Units by mouth daily      conjugated estrogens (PREMARIN) 1 25 mg tablet Take 1 tablet (1 25 mg total) by mouth 2 (two) times a day 180 tablet 3    estradiol valerate (DELESTROGEN) 40 MG/ML injection Inject 0 5 mL (20 mg total) into a muscle every 14 (fourteen) days 15 mL 3    fenofibrate (TRICOR) 145 mg tablet Take 1 tablet (145 mg total) by mouth daily 90 tablet 1    glipiZIDE (GLUCOTROL XL) 5 mg 24 hr tablet Take 1 tablet (5 mg total) by mouth daily 90 tablet 2    glucose blood (OneTouch Verio) test strip 1 each by Other route daily Use as instructed 50 each 3    levothyroxine 150 mcg tablet Take 1 tablet (150 mcg total) by mouth daily 90 tablet 2    metFORMIN (GLUCOPHAGE) 500 mg tablet Take 1 tablet (500 mg total) by mouth 2 (two) times a day with meals 60 tablet 2    naproxen (NAPROSYN) 500 mg tablet TAKE 1 TABLET BY MOUTH TWICE A DAY FOR 5 DAYS 30 tablet 0    OneTouch Delica Lancets 43U MISC by Does not apply route daily 100 each 1    rosuvastatin (CRESTOR) 20 MG tablet Take 1 tablet (20 mg total) by mouth daily 90 tablet 1    spironolactone (ALDACTONE) 100 mg tablet Take 1 tablet (100 mg total) by mouth 2 (two) times a day 180 tablet 3    SYRINGE-NEEDLE, DISP, 3 ML (SAFETY-LUCIANO 3CC SYR 21GX1 5") 21G X 1-1/2" 3 ML MISC Use as directed 30 each 0    traMADol (ULTRAM) 50 mg tablet Take 1 tablet (50 mg total) by mouth daily as needed for severe pain 15 tablet 0    zolpidem (AMBIEN) 5 mg tablet Take 1 tablet (5 mg total) by mouth daily at bedtime as needed for sleep 30 tablet 0     No current facility-administered medications for this visit       Review of Systems   Constitutional: Negative for fatigue and fever  HENT: Negative for congestion and sore throat  Eyes: Negative for visual disturbance  Respiratory: Negative for cough, shortness of breath and wheezing  Cardiovascular: Negative for chest pain, palpitations and leg swelling  Gastrointestinal: Negative for abdominal pain, anal bleeding, constipation, diarrhea, nausea and vomiting  Endocrine: Negative for cold intolerance and heat intolerance  Musculoskeletal: Positive for arthralgias and back pain  Negative for joint swelling  Skin: Negative for color change  Neurological: Negative for dizziness, seizures, syncope, weakness and light-headedness  Psychiatric/Behavioral: Positive for sleep disturbance  Negative for agitation, confusion and suicidal ideas  Objective:      /76 (BP Location: Left arm, Patient Position: Sitting, Cuff Size: Adult)   Temp 99 4 °F (37 4 °C) (Temporal)   Resp 18   Ht 5' 10" (1 778 m)   Wt 97 5 kg (215 lb)   BMI 30 85 kg/m²          Physical Exam  Constitutional:       Appearance: She is well-developed  HENT:      Head: Normocephalic and atraumatic     Eyes:      Conjunctiva/sclera: Conjunctivae normal       Pupils: Pupils are equal, round, and reactive to light  Neck:      Vascular: No JVD  Cardiovascular:      Rate and Rhythm: Normal rate and regular rhythm  Heart sounds: Normal heart sounds  No murmur heard  No friction rub  No gallop  Pulmonary:      Effort: Pulmonary effort is normal  No respiratory distress  Breath sounds: Normal breath sounds  No wheezing  Abdominal:      General: Bowel sounds are normal  There is no distension  Palpations: Abdomen is soft  Tenderness: There is no abdominal tenderness  Musculoskeletal:         General: Normal range of motion  Cervical back: Normal range of motion and neck supple  Skin:     General: Skin is warm and dry  Neurological:      Mental Status: She is alert and oriented to person, place, and time  Deep Tendon Reflexes: Reflexes are normal and symmetric  Psychiatric:         Behavior: Behavior normal          Thought Content: Thought content normal          Judgment: Judgment normal          BMI Counseling: Body mass index is 30 85 kg/m²  The BMI is above normal  Nutrition recommendations include reducing portion sizes, decreasing overall calorie intake, 3-5 servings of fruits/vegetables daily and reducing fast food intake  Exercise recommendations include moderate aerobic physical activity for 150 minutes/week

## 2022-04-13 NOTE — ASSESSMENT & PLAN NOTE
Lab Results   Component Value Date    HGBA1C 8 5 (A) 04/13/2022       - Continue metformin 500mg BID   - Continue glipizide 5mg Q24hr  - Continue lifestyle and dietary modifications

## 2022-04-15 ENCOUNTER — APPOINTMENT (OUTPATIENT)
Dept: LAB | Facility: HOSPITAL | Age: 52
End: 2022-04-15
Payer: COMMERCIAL

## 2022-04-15 DIAGNOSIS — Z12.11 SCREENING FOR COLON CANCER: ICD-10-CM

## 2022-04-15 LAB — HEMOCCULT STL QL IA: NEGATIVE

## 2022-04-15 PROCEDURE — G0328 FECAL BLOOD SCRN IMMUNOASSAY: HCPCS

## 2022-04-26 ENCOUNTER — CLINICAL SUPPORT (OUTPATIENT)
Dept: FAMILY MEDICINE CLINIC | Facility: CLINIC | Age: 52
End: 2022-04-26

## 2022-04-26 DIAGNOSIS — Z01.00 DIABETIC EYE EXAM (HCC): Primary | ICD-10-CM

## 2022-04-26 DIAGNOSIS — E11.9 DIABETIC EYE EXAM (HCC): Primary | ICD-10-CM

## 2022-04-26 LAB
LEFT EYE DIABETIC RETINOPATHY: NORMAL
LEFT EYE IMAGE QUALITY: NORMAL
LEFT EYE MACULAR EDEMA: NORMAL
LEFT EYE OTHER RETINOPATHY: NORMAL
RIGHT EYE DIABETIC RETINOPATHY: NORMAL
RIGHT EYE IMAGE QUALITY: NORMAL
RIGHT EYE MACULAR EDEMA: NORMAL
RIGHT EYE OTHER RETINOPATHY: NORMAL
SEVERITY (EYE EXAM): NORMAL

## 2022-04-26 PROCEDURE — 2025F 7 FLD RTA PHOTO W/O RTNOPTHY: CPT | Performed by: FAMILY MEDICINE

## 2022-04-26 PROCEDURE — 92250 FUNDUS PHOTOGRAPHY W/I&R: CPT | Performed by: FAMILY MEDICINE

## 2022-06-14 ENCOUNTER — TELEPHONE (OUTPATIENT)
Dept: FAMILY MEDICINE CLINIC | Facility: CLINIC | Age: 52
End: 2022-06-14

## 2022-06-14 NOTE — TELEPHONE ENCOUNTER
PCP SIGNATURE NEEDED FOR [employer] FOR[facility] RECEIVED VIA FAX AND PLACED IN PCP FOLDER TO BE DELIVERED AT ASSIGNED TIMES        Drug therapy opportunity

## 2022-08-19 ENCOUNTER — OFFICE VISIT (OUTPATIENT)
Dept: OBGYN CLINIC | Facility: CLINIC | Age: 52
End: 2022-08-19

## 2022-08-19 VITALS
RESPIRATION RATE: 18 BRPM | HEIGHT: 70 IN | DIASTOLIC BLOOD PRESSURE: 82 MMHG | BODY MASS INDEX: 29.92 KG/M2 | HEART RATE: 100 BPM | SYSTOLIC BLOOD PRESSURE: 140 MMHG | WEIGHT: 209 LBS

## 2022-08-19 DIAGNOSIS — Z78.9 TRANSGENDER: ICD-10-CM

## 2022-08-19 DIAGNOSIS — Z87.890 TRANSGENDER WITH HISTORY OF SEX REASSIGNMENT SURGERY: ICD-10-CM

## 2022-08-19 DIAGNOSIS — Z78.9 MALE-TO-FEMALE TRANSGENDER PERSON: ICD-10-CM

## 2022-08-19 PROCEDURE — 3725F SCREEN DEPRESSION PERFORMED: CPT | Performed by: OBSTETRICS & GYNECOLOGY

## 2022-08-19 PROCEDURE — 99213 OFFICE O/P EST LOW 20 MIN: CPT | Performed by: OBSTETRICS & GYNECOLOGY

## 2022-08-19 RX ORDER — ESTRADIOL VALERATE 40 MG/ML
20 INJECTION INTRAMUSCULAR
Qty: 15 ML | Refills: 3 | Status: SHIPPED | OUTPATIENT
Start: 2022-08-19

## 2022-08-19 RX ORDER — SPIRONOLACTONE 100 MG/1
100 TABLET, FILM COATED ORAL 2 TIMES DAILY
Qty: 180 TABLET | Refills: 3 | Status: SHIPPED | OUTPATIENT
Start: 2022-08-19 | End: 2022-08-25 | Stop reason: SDUPTHER

## 2022-08-19 NOTE — PROGRESS NOTES
OB/GYN VISIT  Jo Matthew  8/19/2022  10:27 AM    Shilo Lora is a 46 y o  MTF, with PMHx of GRS, hypothyroid, DM  No obstetric history on file  Female who presents for annual well woman exam  Patient had no complaints  Patient is not currently sexually active  Has been following up with primary care  Menstrual History:  OB History    No obstetric history on file  Review of Systems  No vaginal discharge, labial erythema or lesions, dyspareunia  Sexually active: no  Current contraception: no   Family history of breast, endometrial, uterine or ovarian cancer: no   History of abnormal lipids: yes  Gardasil vaccine: No   COVID Vaccine: Yes  Health Maintenance:    She does not perform regular monthly self breast exams  She feels safe at home  She does not follow a well balanced diet  She does not use tobacco  Depression Screening: Patient's depression screening was significant for a PHQ-2 score of 0  Their PHQ-9 score was   Objective:  /82 (BP Location: Right arm, Patient Position: Sitting, Cuff Size: Adult)   Pulse 100   Resp 18   Ht 5' 10" (1 778 m)   Wt 94 8 kg (209 lb)   BMI 29 99 kg/m²  Body mass index is 29 99 kg/m²  Physical Exam:  GEN: The patient was alert and oriented x3, pleasant well-appearing female in no acute distress  HEENT:  Unremarkable, no anterior or posterior lymphadenopathy, no thyromegaly  CV:  Regular rate and rythym  RESP:  Unlabored breathing, clear to ausc  BREAST:  Bilateral implants; Symmetric breasts with no palpable breast masses or obvious breast lesions  She has no retractions or nipple discharge  She has no axillary abnormalities or palpable masses  : S/p SRS  Normal appearing external female genitalia  Vagina with adequate depth and some narrowing  Vaginal prostate exam was NML  No evidence of urethral prolapse   On sterile speculum exam, normal appearing vaginal canal, no vaginal discharge, no bleeding  On bimanual exam, normal s/p SRS  ASSESSMENT/PLAN: Catalina Mei is a 46 y o  No obstetric history on file  who presents for annual gynecologic exam  Transgender MTF  Patient is doing well after long standing s/p SRS  Risk of medication explained    floow up with PCP for medial conditons  Will order mammogram at next visit  Reorder medications  Return to clinic at 6 months         [unfilled]

## 2022-08-25 ENCOUNTER — OFFICE VISIT (OUTPATIENT)
Dept: FAMILY MEDICINE CLINIC | Facility: CLINIC | Age: 52
End: 2022-08-25

## 2022-08-25 VITALS
RESPIRATION RATE: 18 BRPM | OXYGEN SATURATION: 99 % | HEART RATE: 95 BPM | DIASTOLIC BLOOD PRESSURE: 82 MMHG | SYSTOLIC BLOOD PRESSURE: 164 MMHG | TEMPERATURE: 99.4 F | WEIGHT: 208 LBS | BODY MASS INDEX: 29.78 KG/M2 | HEIGHT: 70 IN

## 2022-08-25 DIAGNOSIS — E11.9 TYPE 2 DIABETES MELLITUS WITHOUT COMPLICATION, WITHOUT LONG-TERM CURRENT USE OF INSULIN (HCC): Primary | ICD-10-CM

## 2022-08-25 DIAGNOSIS — E78.1 HYPERTRIGLYCERIDEMIA: ICD-10-CM

## 2022-08-25 DIAGNOSIS — E03.9 ACQUIRED HYPOTHYROIDISM: ICD-10-CM

## 2022-08-25 DIAGNOSIS — I10 HTN, GOAL BELOW 140/90: ICD-10-CM

## 2022-08-25 DIAGNOSIS — Z78.9 MALE-TO-FEMALE TRANSGENDER PERSON: ICD-10-CM

## 2022-08-25 PROBLEM — Z00.00 ANNUAL PHYSICAL EXAM: Status: RESOLVED | Noted: 2022-04-13 | Resolved: 2022-08-25

## 2022-08-25 LAB — SL AMB POCT HEMOGLOBIN AIC: 8 (ref ?–6.5)

## 2022-08-25 PROCEDURE — 83036 HEMOGLOBIN GLYCOSYLATED A1C: CPT | Performed by: FAMILY MEDICINE

## 2022-08-25 PROCEDURE — 3077F SYST BP >= 140 MM HG: CPT | Performed by: FAMILY MEDICINE

## 2022-08-25 PROCEDURE — 3079F DIAST BP 80-89 MM HG: CPT | Performed by: FAMILY MEDICINE

## 2022-08-25 PROCEDURE — 99214 OFFICE O/P EST MOD 30 MIN: CPT | Performed by: FAMILY MEDICINE

## 2022-08-25 PROCEDURE — 3052F HG A1C>EQUAL 8.0%<EQUAL 9.0%: CPT | Performed by: FAMILY MEDICINE

## 2022-08-25 RX ORDER — SPIRONOLACTONE 100 MG/1
100 TABLET, FILM COATED ORAL 2 TIMES DAILY
Qty: 180 TABLET | Refills: 3 | Status: SHIPPED | OUTPATIENT
Start: 2022-08-25

## 2022-08-25 RX ORDER — ASPIRIN 81 MG/1
81 TABLET ORAL DAILY
Qty: 90 TABLET | Refills: 2 | Status: SHIPPED | OUTPATIENT
Start: 2022-08-25

## 2022-08-25 RX ORDER — ROSUVASTATIN CALCIUM 20 MG/1
20 TABLET, COATED ORAL DAILY
Qty: 90 TABLET | Refills: 1 | Status: SHIPPED | OUTPATIENT
Start: 2022-08-25

## 2022-08-25 RX ORDER — LEVOTHYROXINE SODIUM 0.15 MG/1
150 TABLET ORAL DAILY
Qty: 90 TABLET | Refills: 2 | Status: SHIPPED | OUTPATIENT
Start: 2022-08-25

## 2022-08-25 RX ORDER — FENOFIBRATE 145 MG/1
145 TABLET, COATED ORAL DAILY
Qty: 90 TABLET | Refills: 1 | Status: SHIPPED | OUTPATIENT
Start: 2022-08-25

## 2022-08-25 RX ORDER — GLIPIZIDE 5 MG/1
5 TABLET, FILM COATED, EXTENDED RELEASE ORAL DAILY
Qty: 90 TABLET | Refills: 2 | Status: SHIPPED | OUTPATIENT
Start: 2022-08-25

## 2022-08-25 NOTE — ASSESSMENT & PLAN NOTE
Patient's blood pressure is not controlled  Advised to take medication as prescribed     Will continue spironolactone 100mg  B i d

## 2022-08-25 NOTE — PROGRESS NOTES
Assessment/Plan:    Type 2 diabetes mellitus without complication, without long-term current use of insulin (HCC)    Lab Results   Component Value Date    HGBA1C 8 0 (A) 08/25/2022       - Continue metformin 500mg BID   - Continue glipizide 5mg Q24hr  - Continue lifestyle and dietary modifications     Hypothyroidism  - continue levothyroxine 150 mcg once daily  - advised to obtain TSH     HTN, goal below 140/90  Patient's blood pressure is not controlled  Advised to take medication as prescribed  Will continue spironolactone 100mg  B i d  Hypertriglyceridemia  - Continue fenofibrate and crestor QD  - Will repeat lipid panel        Diagnoses and all orders for this visit:    Type 2 diabetes mellitus without complication, without long-term current use of insulin (HCC)  -     Microalbumin / creatinine urine ratio ([facility]); Future  -     POCT hemoglobin A1c  -     Microalbumin / creatinine urine ratio ([facility])  -     metFORMIN (GLUCOPHAGE) 500 mg tablet; Take 1 tablet (500 mg total) by mouth 2 (two) times a day with meals  -     glipiZIDE (GLUCOTROL XL) 5 mg 24 hr tablet; Take 1 tablet (5 mg total) by mouth daily  -     aspirin (Aspirin Adult Low Strength) 81 mg EC tablet; Take 1 tablet (81 mg total) by mouth daily    HTN, goal below 140/90    Acquired hypothyroidism  -     TSH, 3rd generation; Future  -     TSH, 3rd generation  -     levothyroxine 150 mcg tablet; Take 1 tablet (150 mcg total) by mouth daily    Hypertriglyceridemia  -     Lipid panel; Future  -     Lipid panel  -     fenofibrate (TRICOR) 145 mg tablet; Take 1 tablet (145 mg total) by mouth daily  -     rosuvastatin (CRESTOR) 20 MG tablet; Take 1 tablet (20 mg total) by mouth daily    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  -     spironolactone (ALDACTONE) 100 mg tablet;  Take 1 tablet (100 mg total) by mouth 2 (two) times a day    Other orders  -     Cancel: Pneumococcal Conjugate Vaccine 20-valent (Pcv20)          Subjective: Patient ID: Lucy Ordaz is a 46 y o  adult  Doing well and complaint with medications  However, under a lot of stress lately with personal matters  Still exercising about 2 times a week  No other complaints  The following portions of the patient's history were reviewed and updated as appropriate:   She  has a past medical history of Chronic pain of both knees (12/12/2019), Diabetes mellitus ([address] ), Seizure disorder ([facility] ), and 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)  She   Patient Active Problem List    Diagnosis Date Noted    Chronic bilateral low back pain without sciatica 01/27/2021    Primary insomnia 02/26/2020    HTN, goal below 140/90 10/15/2018    Tobacco abuse 09/26/2018    Hypertriglyceridemia 08/10/2018    Type 2 diabetes mellitus without complication, without long-term current use of insulin ([facility] ) 08/10/2018    Hypothyroidism 10/15/2014    Adolescent or adult gender identity disorder 09/18/2013     She  has a past surgical history that includes Tonsillectomy and Breast surgery  Her family history includes Cancer in her family; Depression in her family; Diabetes in her family; Hypertension in her family; Mental illness in her family; Stroke in her family  She  reports that she has been smoking cigarettes  She has been smoking about 0 50 packs per day  She has never used smokeless tobacco  She reports current alcohol use of about 1 0 standard drink of alcohol per week  She reports that she does not use drugs    Current Outpatient Medications   Medication Sig Dispense Refill    aspirin (Aspirin Adult Low Strength) 81 mg EC tablet Take 1 tablet (81 mg total) by mouth daily 90 tablet 2    fenofibrate (TRICOR) 145 mg tablet Take 1 tablet (145 mg total) by mouth daily 90 tablet 1    glipiZIDE (GLUCOTROL XL) 5 mg 24 hr tablet Take 1 tablet (5 mg total) by mouth daily 90 tablet 2    levothyroxine 150 mcg tablet Take 1 tablet (150 mcg total) by mouth daily 90 tablet 2    metFORMIN (GLUCOPHAGE) 500 mg tablet Take 1 tablet (500 mg total) by mouth 2 (two) times a day with meals 60 tablet 2    rosuvastatin (CRESTOR) 20 MG tablet Take 1 tablet (20 mg total) by mouth daily 90 tablet 1    spironolactone (ALDACTONE) 100 mg tablet Take 1 tablet (100 mg total) by mouth 2 (two) times a day 180 tablet 3    acetaminophen (TYLENOL) 500 mg tablet Take 1 tablet (500 mg total) by mouth every 6 (six) hours as needed for mild pain 30 tablet 0    Blood Glucose Monitoring Suppl (ONETOUCH VERIO FLEX SYSTEM) w/Device KIT by Other route daily 1 kit 0    cholecalciferol (VITAMIN D3) 1,000 units tablet Take 1,000 Units by mouth daily      conjugated estrogens (PREMARIN) 1 25 mg tablet Take 1 tablet (1 25 mg total) by mouth 2 (two) times a day 180 tablet 3    estradiol valerate (DELESTROGEN) 40 MG/ML injection Inject 0 5 mL (20 mg total) into a muscle every 14 (fourteen) days 15 mL 3    glucose blood (OneTouch Verio) test strip 1 each by Other route daily Use as instructed 50 each 3    naproxen (NAPROSYN) 500 mg tablet TAKE 1 TABLET BY MOUTH TWICE A DAY FOR 5 DAYS 30 tablet 0    OneTouch Delica Lancets 52P MISC by Does not apply route daily 100 each 1    SYRINGE-NEEDLE, DISP, 3 ML (SAFETY-LUCIANO 3CC SYR 21GX1 5") 21G X 1-1/2" 3 ML MISC Use as directed 30 each 0     No current facility-administered medications for this visit       Review of Systems   Constitutional: Negative for fatigue and fever  HENT: Negative for congestion and sore throat  Eyes: Negative for visual disturbance  Respiratory: Negative for cough, shortness of breath and wheezing  Cardiovascular: Negative for chest pain, palpitations and leg swelling  Gastrointestinal: Negative for abdominal pain, anal bleeding, constipation, diarrhea, nausea and vomiting  Endocrine: Negative for cold intolerance and heat intolerance  Musculoskeletal: Negative for arthralgias and joint swelling  Skin: Negative for color change     Neurological: Negative for dizziness, seizures, syncope, weakness and light-headedness  Psychiatric/Behavioral: Negative for agitation, confusion, sleep disturbance and suicidal ideas  Objective:      /82 (BP Location: Left arm, Patient Position: Sitting, Cuff Size: Adult)   Pulse 95   Temp 99 4 °F (37 4 °C) (Temporal)   Resp 18   Ht 5' 10" (1 778 m)   Wt 94 3 kg (208 lb)   SpO2 99%   BMI 29 84 kg/m²          Physical Exam  Constitutional:       Appearance: She is well-developed  HENT:      Head: Normocephalic and atraumatic  Eyes:      Conjunctiva/sclera: Conjunctivae normal       Pupils: Pupils are equal, round, and reactive to light  Neck:      Vascular: No JVD  Cardiovascular:      Rate and Rhythm: Normal rate and regular rhythm  Heart sounds: Normal heart sounds  No murmur heard  No friction rub  No gallop  Pulmonary:      Effort: Pulmonary effort is normal  No respiratory distress  Breath sounds: Normal breath sounds  No wheezing  Abdominal:      General: Bowel sounds are normal  There is no distension  Palpations: Abdomen is soft  Tenderness: There is no abdominal tenderness  Musculoskeletal:         General: Normal range of motion  Cervical back: Normal range of motion and neck supple  Skin:     General: Skin is warm and dry  Neurological:      Mental Status: She is alert and oriented to person, place, and time  Deep Tendon Reflexes: Reflexes are normal and symmetric  Psychiatric:         Behavior: Behavior normal          Thought Content:  Thought content normal          Judgment: Judgment normal

## 2022-08-25 NOTE — ASSESSMENT & PLAN NOTE
Lab Results   Component Value Date    HGBA1C 8 0 (A) 08/25/2022       - Continue metformin 500mg BID   - Continue glipizide 5mg Q24hr  - Continue lifestyle and dietary modifications

## 2022-12-22 ENCOUNTER — NURSE TRIAGE (OUTPATIENT)
Dept: OTHER | Facility: OTHER | Age: 52
End: 2022-12-22

## 2022-12-22 NOTE — TELEPHONE ENCOUNTER
Please to to be evaluated at urgent care of emergency room  Reason for Disposition  • Chest pain or pressure    Answer Assessment - Initial Assessment Questions  1  COVID-19 DIAGNOSIS: Home test positive last night  2  COVID-19 EXPOSURE: "Was there any known exposure to COVID before the symptoms began?" CDC Definition of close contact: within 6 feet (2 meters) for a total of 15 minutes or more over a 24-hour period  Sister in law   3  ONSET: "When did the COVID-19 symptoms start?"       2 days ago  4  WORST SYMPTOM: "What is your worst symptom?" (e g , cough, fever, shortness of breath, muscle aches)     Muscle aches, chest pain back pain   5  COUGH: "Do you have a cough?" If Yes, ask: "How bad is the cough?"       Dry non-productive  6  FEVER: "Do you have a fever?" If Yes, ask: "What is your temperature, how was it measured, and when did it start?"     Denies   7  RESPIRATORY STATUS: "Describe your breathing?" (e g , shortness of breath, wheezing, unable to speak)    Denies   8  BETTER-SAME-WORSE: "Are you getting better, staying the same or getting worse compared to yesterday?"  If getting worse, ask, "In what way?"     Worse   9  HIGH RISK DISEASE: "Do you have any chronic medical problems?" (e g , asthma, heart or lung disease, weak immune system, obesity, etc )      Diabetic   10  VACCINE: "Have you had the COVID-19 vaccine?" If Yes, ask: "Which one, how many shots, when did you get it?"       2   11  BOOSTER: "Have you received your COVID-19 booster?" If Yes, ask: "Which one and when did you get it?"    "haroon had them all"   13  OTHER SYMPTOMS: "Do you have any other symptoms?"  (e g , chills, fatigue, headache, loss of smell or taste, muscle pain, sore throat)      "pain in back stomach and chest "   14   O2 SATURATION MONITOR:  "Do you use an oxygen saturation monitor (pulse oximeter) at home?" If Yes, ask "What is your reading (oxygen level) today?" "What is your usual oxygen saturation reading?" (e g , 95%)      Denies    Protocols used: CORONAVIRUS (COVID-19) DIAGNOSED OR SUSPECTED-ADULT-[place]

## 2022-12-22 NOTE — TELEPHONE ENCOUNTER
Regarding: covid positive concerns  ----- Message from [facility] sent at 12/22/2022  8:02 AM EST -----  "I tested positive for covid and would like to discuss next steps "

## 2023-01-20 DIAGNOSIS — E78.1 HYPERTRIGLYCERIDEMIA: ICD-10-CM

## 2023-01-20 RX ORDER — ROSUVASTATIN CALCIUM 20 MG/1
TABLET, COATED ORAL
Qty: 90 TABLET | Refills: 1 | Status: SHIPPED | OUTPATIENT
Start: 2023-01-20

## 2023-02-07 DIAGNOSIS — Z78.9 TRANSGENDER: ICD-10-CM

## 2023-02-16 ENCOUNTER — OFFICE VISIT (OUTPATIENT)
Dept: FAMILY MEDICINE CLINIC | Facility: CLINIC | Age: 53
End: 2023-02-16

## 2023-02-16 VITALS
SYSTOLIC BLOOD PRESSURE: 142 MMHG | WEIGHT: 214.8 LBS | RESPIRATION RATE: 17 BRPM | HEIGHT: 70 IN | DIASTOLIC BLOOD PRESSURE: 84 MMHG | HEART RATE: 96 BPM | BODY MASS INDEX: 30.75 KG/M2 | TEMPERATURE: 98.1 F | OXYGEN SATURATION: 97 %

## 2023-02-16 DIAGNOSIS — E78.1 HYPERTRIGLYCERIDEMIA: ICD-10-CM

## 2023-02-16 DIAGNOSIS — M54.50 CHRONIC BILATERAL LOW BACK PAIN WITHOUT SCIATICA: ICD-10-CM

## 2023-02-16 DIAGNOSIS — E11.9 TYPE 2 DIABETES MELLITUS WITHOUT COMPLICATION, WITHOUT LONG-TERM CURRENT USE OF INSULIN (HCC): Primary | ICD-10-CM

## 2023-02-16 DIAGNOSIS — E03.9 ACQUIRED HYPOTHYROIDISM: ICD-10-CM

## 2023-02-16 DIAGNOSIS — G44.209 TENSION HEADACHE: ICD-10-CM

## 2023-02-16 DIAGNOSIS — Z23 ENCOUNTER FOR IMMUNIZATION: ICD-10-CM

## 2023-02-16 DIAGNOSIS — Z78.9 MALE-TO-FEMALE TRANSGENDER PERSON: ICD-10-CM

## 2023-02-16 DIAGNOSIS — I10 HTN, GOAL BELOW 140/90: ICD-10-CM

## 2023-02-16 DIAGNOSIS — G89.29 CHRONIC BILATERAL LOW BACK PAIN WITHOUT SCIATICA: ICD-10-CM

## 2023-02-16 RX ORDER — ROSUVASTATIN CALCIUM 20 MG/1
20 TABLET, COATED ORAL DAILY
Qty: 90 TABLET | Refills: 2 | Status: SHIPPED | OUTPATIENT
Start: 2023-02-16

## 2023-02-16 RX ORDER — ZOSTER VACCINE RECOMBINANT, ADJUVANTED 50 MCG/0.5
0.5 KIT INTRAMUSCULAR ONCE
Qty: 1 EACH | Refills: 1 | Status: SHIPPED | OUTPATIENT
Start: 2023-02-16 | End: 2023-02-16

## 2023-02-16 RX ORDER — FENOFIBRATE 145 MG/1
145 TABLET, COATED ORAL DAILY
Qty: 90 TABLET | Refills: 1 | Status: SHIPPED | OUTPATIENT
Start: 2023-02-16

## 2023-02-16 RX ORDER — LEVOTHYROXINE SODIUM 0.15 MG/1
150 TABLET ORAL DAILY
Qty: 90 TABLET | Refills: 2 | Status: SHIPPED | OUTPATIENT
Start: 2023-02-16

## 2023-02-16 RX ORDER — NAPROXEN 500 MG/1
500 TABLET ORAL 2 TIMES DAILY PRN
Qty: 30 TABLET | Refills: 1 | Status: SHIPPED | OUTPATIENT
Start: 2023-02-16

## 2023-02-16 RX ORDER — ASPIRIN 81 MG/1
81 TABLET ORAL DAILY
Qty: 90 TABLET | Refills: 2 | Status: SHIPPED | OUTPATIENT
Start: 2023-02-16

## 2023-02-16 RX ORDER — GLIPIZIDE 5 MG/1
5 TABLET, FILM COATED, EXTENDED RELEASE ORAL DAILY
Qty: 90 TABLET | Refills: 2 | Status: SHIPPED | OUTPATIENT
Start: 2023-02-16

## 2023-02-16 RX ORDER — SPIRONOLACTONE 100 MG/1
100 TABLET, FILM COATED ORAL 2 TIMES DAILY
Qty: 180 TABLET | Refills: 3 | Status: SHIPPED | OUTPATIENT
Start: 2023-02-16

## 2023-02-16 RX ORDER — TRAMADOL HYDROCHLORIDE 50 MG/1
50 TABLET ORAL DAILY PRN
Qty: 20 TABLET | Refills: 0 | Status: SHIPPED | OUTPATIENT
Start: 2023-02-16

## 2023-02-16 NOTE — PROGRESS NOTES
Name: Mallory Coffman      : 1970      MRN: [id number]  Encounter Provider: Christin Rubinstein, MD  Encounter Date: 2023   Encounter department: [address] [place] [address]     1  Type 2 diabetes mellitus without complication, without long-term current use of insulin ([facility])  Assessment & Plan:    Lab Results   Component Value Date    HGBA1C 8 0 (A) 2022       - Continue metformin 500mg BID   - Continue glipizide 5mg Q24hr  - Continue lifestyle and dietary modifications     Orders:  -     POCT hemoglobin A1c  -     HEMOGLOBIN A1C W/ EAG ESTIMATION; Future  -     Microalbumin / creatinine urine ratio  -     HEMOGLOBIN A1C W/ EAG ESTIMATION  -     metFORMIN (GLUCOPHAGE) 500 mg tablet; Take 1 tablet (500 mg total) by mouth 2 (two) times a day with meals  -     glipiZIDE (GLUCOTROL XL) 5 mg 24 hr tablet; Take 1 tablet (5 mg total) by mouth daily  -     aspirin (Aspirin Adult Low Strength) 81 mg EC tablet; Take 1 tablet (81 mg total) by mouth daily    2  HTN, goal below 140/90  Assessment & Plan:  - BP at goal  - Continue spironolactone 100mg BID    Orders:  -     Basic metabolic panel; Future  -     Basic metabolic panel    3  Acquired hypothyroidism  Assessment & Plan:  - continue levothyroxine 150 mcg once daily  - advised to obtain TSH  Orders:  -     TSH, 3rd generation; Future  -     TSH, 3rd generation  -     levothyroxine 150 mcg tablet; Take 1 tablet (150 mcg total) by mouth daily    4  Hypertriglyceridemia  Assessment & Plan:  - Continue fenofibrate and crestor QD  - Will repeat lipid panel     Orders:  -     Lipid panel; Future  -     Lipid panel  -     rosuvastatin (CRESTOR) 20 MG tablet; Take 1 tablet (20 mg total) by mouth daily  -     fenofibrate (TRICOR) 145 mg tablet; Take 1 tablet (145 mg total) by mouth daily    5   Chronic bilateral low back pain without sciatica  Assessment & Plan:  - Will provide a short course of tramadol as NSAIDs and tylenol have not provided relief  Orders:  -     traMADol (Ultram) 50 mg tablet; Take 1 tablet (50 mg total) by mouth daily as needed for moderate pain    6  Tension headache  -     naproxen (NAPROSYN) 500 mg tablet; Take 1 tablet (500 mg total) by mouth 2 (two) times a day as needed for mild pain    7  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  -     spironolactone (ALDACTONE) 100 mg tablet; Take 1 tablet (100 mg total) by mouth 2 (two) times a day    8  Encounter for immunization  -     Pneumococcal Conjugate Vaccine 20-valent (Pcv20)  -     Zoster Vac Recomb Adjuvanted (Shingrix) 50 MCG/0 5ML SUSR; Inject 0 5 mL into a muscle once for 1 dose Repeat dose in 2 to 6 months         Subjective      Doing well and complaint with medications  However, under a lot of stress lately with personal matters  Still exercising about 2 times a week  No other complaints  Review of Systems   Constitutional: Negative for fatigue and fever  HENT: Negative for congestion and sore throat  Eyes: Negative for visual disturbance  Respiratory: Negative for cough, shortness of breath and wheezing  Cardiovascular: Negative for chest pain, palpitations and leg swelling  Gastrointestinal: Negative for abdominal pain, anal bleeding, constipation, diarrhea, nausea and vomiting  Endocrine: Negative for cold intolerance and heat intolerance  Musculoskeletal: Positive for back pain  Negative for arthralgias and joint swelling  Skin: Negative for color change  Neurological: Negative for dizziness, seizures, syncope, weakness and light-headedness  Psychiatric/Behavioral: Negative for agitation, confusion, sleep disturbance and suicidal ideas         Current Outpatient Medications on File Prior to Visit   Medication Sig   • acetaminophen (TYLENOL) 500 mg tablet Take 1 tablet (500 mg total) by mouth every 6 (six) hours as needed for mild pain   • Blood Glucose Monitoring Suppl (ONETOUCH VERIO FLEX SYSTEM) w/Device KIT by Other route daily   • cholecalciferol (VITAMIN D3) 1,000 units tablet Take 1,000 Units by mouth daily   • conjugated estrogens (PREMARIN) 1 25 mg tablet Take 1 tablet (1 25 mg total) by mouth 2 (two) times a day   • estradiol valerate (DELESTROGEN) 40 MG/ML injection Inject 0 5 mL (20 mg total) into a muscle every 14 (fourteen) days   • glucose blood (OneTouch Verio) test strip 1 each by Other route daily Use as instructed   • OneTouch Delica Lancets 17I MISC by Does not apply route daily   • SYRINGE-NEEDLE, DISP, 3 ML (SAFETY-LUCIANO 3CC SYR 21GX1 5") 21G X 1-1/2" 3 ML MISC Use as directed   • [DISCONTINUED] aspirin (Aspirin Adult Low Strength) 81 mg EC tablet Take 1 tablet (81 mg total) by mouth daily   • [DISCONTINUED] fenofibrate (TRICOR) 145 mg tablet Take 1 tablet (145 mg total) by mouth daily   • [DISCONTINUED] glipiZIDE (GLUCOTROL XL) 5 mg 24 hr tablet Take 1 tablet (5 mg total) by mouth daily   • [DISCONTINUED] levothyroxine 150 mcg tablet Take 1 tablet (150 mcg total) by mouth daily   • [DISCONTINUED] metFORMIN (GLUCOPHAGE) 500 mg tablet Take 1 tablet (500 mg total) by mouth 2 (two) times a day with meals   • [DISCONTINUED] naproxen (NAPROSYN) 500 mg tablet TAKE 1 TABLET BY MOUTH TWICE A DAY FOR 5 DAYS   • [DISCONTINUED] rosuvastatin (CRESTOR) 20 MG tablet TAKE 1 TABLET BY MOUTH EVERY DAY   • [DISCONTINUED] spironolactone (ALDACTONE) 100 mg tablet Take 1 tablet (100 mg total) by mouth 2 (two) times a day       Objective     /84 (BP Location: Left arm, Patient Position: Sitting, Cuff Size: Standard)   Pulse 96   Temp 98 1 °F (36 7 °C) (Temporal)   Resp 17   Ht 5' 10" (1 778 m)   Wt 97 4 kg (214 lb 12 8 oz)   SpO2 97%   BMI 30 82 kg/m²     Physical Exam  Constitutional:       Appearance: She is well-developed  HENT:      Head: Normocephalic and atraumatic  Eyes:      Conjunctiva/sclera: Conjunctivae normal       Pupils: Pupils are equal, round, and reactive to light  Neck:      Vascular: No JVD  Cardiovascular:      Rate and Rhythm: Normal rate and regular rhythm  Heart sounds: Normal heart sounds  No murmur heard  No friction rub  No gallop  Pulmonary:      Effort: Pulmonary effort is normal  No respiratory distress  Breath sounds: Normal breath sounds  No wheezing  Abdominal:      General: Bowel sounds are normal  There is no distension  Palpations: Abdomen is soft  Tenderness: There is no abdominal tenderness  Musculoskeletal:      Cervical back: Normal range of motion and neck supple  Thoracic back: Tenderness present  No swelling, edema, deformity, signs of trauma, lacerations, spasms or bony tenderness  Decreased range of motion  No scoliosis  Skin:     General: Skin is warm and dry  Neurological:      Mental Status: She is alert and oriented to person, place, and time  Deep Tendon Reflexes: Reflexes are normal and symmetric  Psychiatric:         Behavior: Behavior normal          Thought Content:  Thought content normal          Judgment: Judgment normal        Meli Bruce MD

## 2023-02-17 RX ORDER — ESTRADIOL VALERATE 40 MG/ML
20 INJECTION INTRAMUSCULAR
Qty: 15 ML | Refills: 2 | Status: SHIPPED | OUTPATIENT
Start: 2023-02-17

## 2023-02-24 ENCOUNTER — OFFICE VISIT (OUTPATIENT)
Dept: OBGYN CLINIC | Facility: CLINIC | Age: 53
End: 2023-02-24

## 2023-02-24 VITALS
SYSTOLIC BLOOD PRESSURE: 172 MMHG | BODY MASS INDEX: 30.75 KG/M2 | HEART RATE: 102 BPM | WEIGHT: 214.8 LBS | HEIGHT: 70 IN | DIASTOLIC BLOOD PRESSURE: 85 MMHG

## 2023-02-24 DIAGNOSIS — Z78.9 MALE-TO-FEMALE TRANSGENDER PERSON: Primary | ICD-10-CM

## 2023-02-24 RX ORDER — ESTRADIOL 2 MG/1
2 TABLET ORAL
Qty: 90 TABLET | Refills: 4 | Status: SHIPPED | OUTPATIENT
Start: 2023-02-24

## 2023-02-24 NOTE — PROGRESS NOTES
Assessment: Transgender female requesting changing hormone therapy  1   Diabetes type 2   2  Hypertension  3  Lipid abnormality    Plan:  1  Switch from Premarin to Estrace  2  Labs are scheduled lipids and CHEM profile  3  Return to clinic in 6 months for pelvic exam     Ernie Pimentel is a 49-year-old transgender female who I have been following for many years  Currently she is requesting change from Premarin to estradiol due to insurance coverage  She has followed by primary care who is managing her hypertriglyceridemia, mild hypertension, and type 2 diabetes  Today she has no complaints and just wishing for prescription change  Her urinary stream is been following and she is currently not sexually active she feels safe and is in an adequate living situation  She is considering undergoing facial  surgery in May

## 2023-03-25 DIAGNOSIS — G44.209 TENSION HEADACHE: ICD-10-CM

## 2023-03-27 RX ORDER — NAPROXEN 500 MG/1
TABLET ORAL
Qty: 30 TABLET | Refills: 1 | Status: SHIPPED | OUTPATIENT
Start: 2023-03-27

## 2023-04-02 DIAGNOSIS — Z78.9 MALE-TO-FEMALE TRANSGENDER PERSON: ICD-10-CM

## 2023-04-07 RX ORDER — ESTRADIOL 2 MG/1
TABLET ORAL
Qty: 180 TABLET | Refills: 3 | Status: SHIPPED | OUTPATIENT
Start: 2023-04-07

## 2023-05-04 ENCOUNTER — APPOINTMENT (OUTPATIENT)
Dept: LAB | Facility: HOSPITAL | Age: 53
End: 2023-05-04

## 2023-05-04 DIAGNOSIS — E03.9 ACQUIRED HYPOTHYROIDISM: Primary | ICD-10-CM

## 2023-05-04 LAB
ANION GAP SERPL CALCULATED.3IONS-SCNC: 11 MMOL/L (ref 4–13)
BUN SERPL-MCNC: 12 MG/DL (ref 5–25)
CALCIUM SERPL-MCNC: 8.9 MG/DL (ref 8.4–10.2)
CHLORIDE SERPL-SCNC: 102 MMOL/L (ref 96–108)
CHOLEST SERPL-MCNC: 258 MG/DL
CO2 SERPL-SCNC: 23 MMOL/L (ref 21–32)
CREAT SERPL-MCNC: 0.57 MG/DL (ref 0.6–1.3)
CREAT UR-MCNC: 59.8 MG/DL
GLUCOSE P FAST SERPL-MCNC: 228 MG/DL (ref 65–99)
HDLC SERPL-MCNC: 44 MG/DL
MICROALBUMIN UR-MCNC: 316 MG/L (ref 0–20)
MICROALBUMIN/CREAT 24H UR: 528 MG/G CREATININE (ref 0–30)
NONHDLC SERPL-MCNC: 214 MG/DL
POTASSIUM SERPL-SCNC: 4 MMOL/L (ref 3.5–5.3)
SODIUM SERPL-SCNC: 136 MMOL/L (ref 135–147)
TRIGL SERPL-MCNC: 712 MG/DL
TSH SERPL DL<=0.05 MIU/L-ACNC: 3.15 UIU/ML (ref 0.45–4.5)

## 2023-05-06 LAB
EST. AVERAGE GLUCOSE BLD GHB EST-MCNC: 226 MG/DL
HBA1C MFR BLD: 9.5 %

## 2023-05-09 NOTE — ASSESSMENT & PLAN NOTE
Lab Results   Component Value Date    HGBA1C 9 5 (H) 05/04/2023     - HBA1C continues to worsen  - Reviewed medications and adherence to them  - Discussed resuming lifestyle and dietary changes which helped significantly in the past   - Would like to defer complete foot exam for next visit  - Needs ACE/ARB but has refused  Will discuss again on follow up visit

## 2023-05-09 NOTE — PROGRESS NOTES
Name: Claude Honor      : 1970      MRN: [id number]  Encounter Provider: Walter Peace MD  Encounter Date: 5/10/2023   Encounter department: [address] [place], [place] [address]     1  Type 2 diabetes mellitus without complication, without long-term current use of insulin (Formerly [facility])  Assessment & Plan:    Lab Results   Component Value Date    HGBA1C 9 5 (H) 2023     - HBA1C continues to worsen  - Reviewed medications and adherence to them  - Discussed resuming lifestyle and dietary changes which helped significantly in the past   - Would like to defer complete foot exam for next visit  - Needs ACE/ARB but has refused  Will discuss again on follow up visit  Orders:  -     HEMOGLOBIN A1C W/ EAG ESTIMATION; Future  -     metFORMIN (GLUCOPHAGE) 500 mg tablet; Take 1 tablet (500 mg total) by mouth 2 (two) times a day with meals  -     glipiZIDE (GLUCOTROL XL) 5 mg 24 hr tablet; Take 1 tablet (5 mg total) by mouth daily  -     aspirin (Aspirin Adult Low Strength) 81 mg EC tablet; Take 1 tablet (81 mg total) by mouth daily  -     HEMOGLOBIN A1C W/ EAG ESTIMATION    2  Acquired hypothyroidism  Assessment & Plan:  - TSH WNL   - continue levothyroxine 150 mcg once daily  - advised to obtain TSH on follow up visit    Orders:  -     TSH, 3rd generation; Future  -     levothyroxine 150 mcg tablet; Take 1 tablet (150 mcg total) by mouth daily  -     TSH, 3rd generation    3  HTN, goal below 140/90  Assessment & Plan:  - BP at goal  - Continue spironolactone 100mg BID    Orders:  -     Basic metabolic panel; Future  -     Basic metabolic panel    4  Hypertriglyceridemia  Assessment & Plan:  - Cholesterol and triglycerides increasing   - Advised to resume and take medications as prescribed    Orders:  -     Lipid panel; Future  -     rosuvastatin (CRESTOR) 20 MG tablet; Take 1 tablet (20 mg total) by mouth daily  -     fenofibrate (TRICOR) 145 mg tablet;  Take 1 tablet (145 mg total) by mouth daily  -     Lipid panel    5  Screening for colorectal cancer  -     Jonathan    6  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  -     spironolactone (ALDACTONE) 100 mg tablet; Take 1 tablet (100 mg total) by mouth 2 (two) times a day    7  Primary insomnia  Assessment & Plan:  - Not doing well recently dreaming of her father who past   - Will restart Nathan Araiza which has helped in the past      Orders:  -     zolpidem (AMBIEN) 5 mg tablet; Take 1 tablet (5 mg total) by mouth daily at bedtime as needed for sleep    8  Onychomycosis of left great toe  -     Ambulatory Referral to Podiatry; Future         Subjective      Doing well and complaint with medications  However, under a lot of stress lately with personal matters  Still exercising about 2 times a week  No other complaints  Review of Systems   Constitutional: Negative for fatigue and fever  HENT: Negative for congestion and sore throat  Eyes: Negative for visual disturbance  Respiratory: Negative for cough, shortness of breath and wheezing  Cardiovascular: Negative for chest pain, palpitations and leg swelling  Gastrointestinal: Negative for abdominal pain, anal bleeding, constipation, diarrhea, nausea and vomiting  Endocrine: Negative for cold intolerance and heat intolerance  Musculoskeletal: Negative for arthralgias and joint swelling  Skin: Negative for color change  Neurological: Negative for dizziness, seizures, syncope, weakness and light-headedness  Psychiatric/Behavioral: Negative for agitation, confusion, sleep disturbance and suicidal ideas         Current Outpatient Medications on File Prior to Visit   Medication Sig   • acetaminophen (TYLENOL) 500 mg tablet Take 1 tablet (500 mg total) by mouth every 6 (six) hours as needed for mild pain   • Blood Glucose Monitoring Suppl (ONETOUCH VERIO FLEX SYSTEM) w/Device KIT by Other route daily   • cholecalciferol (VITAMIN D3) 1,000 units tablet Take 1,000 Units by mouth daily "  • estradiol (ESTRACE) 2 MG tablet TAKE 1 TABLET BY MOUTH 2 TIMES A DAY  • estradiol valerate (DELESTROGEN) 40 MG/ML injection INJECT 0 5 ML (20 MG TOTAL) INTO A MUSCLE EVERY 14 (FOURTEEN) DAYS   • glucose blood (OneTouch Verio) test strip 1 each by Other route daily Use as instructed   • naproxen (NAPROSYN) 500 mg tablet TAKE 1 TABLET BY MOUTH 2 TIMES A DAY AS NEEDED FOR MILD PAIN  • OneTouch Delica Lancets 29P MISC by Does not apply route daily   • SYRINGE-NEEDLE, DISP, 3 ML (SAFETY-LUCIANO 3CC SYR 21GX1 5\") 21G X 1-1/2\" 3 ML MISC Use as directed   • [DISCONTINUED] aspirin (Aspirin Adult Low Strength) 81 mg EC tablet Take 1 tablet (81 mg total) by mouth daily   • [DISCONTINUED] fenofibrate (TRICOR) 145 mg tablet Take 1 tablet (145 mg total) by mouth daily   • [DISCONTINUED] glipiZIDE (GLUCOTROL XL) 5 mg 24 hr tablet Take 1 tablet (5 mg total) by mouth daily   • [DISCONTINUED] levothyroxine 150 mcg tablet Take 1 tablet (150 mcg total) by mouth daily   • [DISCONTINUED] metFORMIN (GLUCOPHAGE) 500 mg tablet TAKE 1 TABLET BY MOUTH TWICE A DAY WITH MEALS   • [DISCONTINUED] rosuvastatin (CRESTOR) 20 MG tablet Take 1 tablet (20 mg total) by mouth daily   • [DISCONTINUED] spironolactone (ALDACTONE) 100 mg tablet Take 1 tablet (100 mg total) by mouth 2 (two) times a day   • [DISCONTINUED] traMADol (Ultram) 50 mg tablet Take 1 tablet (50 mg total) by mouth daily as needed for moderate pain       Objective     /86 (BP Location: Left arm, Patient Position: Sitting, Cuff Size: Standard)   Temp 98 9 °F (37 2 °C) (Temporal)   Resp 18   Ht 5' 10\" (1 778 m)   Wt 97 1 kg (214 lb)   BMI 30 71 kg/m²     Physical Exam  Constitutional:       Appearance: She is well-developed  HENT:      Head: Normocephalic and atraumatic  Eyes:      Conjunctiva/sclera: Conjunctivae normal       Pupils: Pupils are equal, round, and reactive to light  Neck:      Vascular: No JVD     Cardiovascular:      Rate and Rhythm: Normal rate and " regular rhythm  Heart sounds: Normal heart sounds  No murmur heard  No friction rub  No gallop  Pulmonary:      Effort: Pulmonary effort is normal  No respiratory distress  Breath sounds: Normal breath sounds  No wheezing  Abdominal:      General: Bowel sounds are normal  There is no distension  Palpations: Abdomen is soft  Tenderness: There is no abdominal tenderness  Musculoskeletal:         General: Normal range of motion  Cervical back: Normal range of motion and neck supple  Skin:     General: Skin is warm and dry  Neurological:      Mental Status: She is alert and oriented to person, place, and time  Deep Tendon Reflexes: Reflexes are normal and symmetric  Psychiatric:         Behavior: Behavior normal          Thought Content:  Thought content normal          Judgment: Judgment normal        Alyssa MD Ole

## 2023-05-10 ENCOUNTER — OFFICE VISIT (OUTPATIENT)
Dept: FAMILY MEDICINE CLINIC | Facility: CLINIC | Age: 53
End: 2023-05-10

## 2023-05-10 VITALS
BODY MASS INDEX: 30.64 KG/M2 | WEIGHT: 214 LBS | SYSTOLIC BLOOD PRESSURE: 138 MMHG | DIASTOLIC BLOOD PRESSURE: 86 MMHG | RESPIRATION RATE: 18 BRPM | TEMPERATURE: 98.9 F | HEIGHT: 70 IN

## 2023-05-10 DIAGNOSIS — I10 HTN, GOAL BELOW 140/90: ICD-10-CM

## 2023-05-10 DIAGNOSIS — Z78.9 MALE-TO-FEMALE TRANSGENDER PERSON: ICD-10-CM

## 2023-05-10 DIAGNOSIS — E03.9 ACQUIRED HYPOTHYROIDISM: ICD-10-CM

## 2023-05-10 DIAGNOSIS — Z12.11 SCREENING FOR COLORECTAL CANCER: ICD-10-CM

## 2023-05-10 DIAGNOSIS — F51.01 PRIMARY INSOMNIA: ICD-10-CM

## 2023-05-10 DIAGNOSIS — E11.9 TYPE 2 DIABETES MELLITUS WITHOUT COMPLICATION, WITHOUT LONG-TERM CURRENT USE OF INSULIN (HCC): Primary | ICD-10-CM

## 2023-05-10 DIAGNOSIS — B35.1 ONYCHOMYCOSIS OF LEFT GREAT TOE: ICD-10-CM

## 2023-05-10 DIAGNOSIS — E78.1 HYPERTRIGLYCERIDEMIA: ICD-10-CM

## 2023-05-10 DIAGNOSIS — Z12.12 SCREENING FOR COLORECTAL CANCER: ICD-10-CM

## 2023-05-10 RX ORDER — ROSUVASTATIN CALCIUM 20 MG/1
20 TABLET, COATED ORAL DAILY
Qty: 90 TABLET | Refills: 2 | Status: SHIPPED | OUTPATIENT
Start: 2023-05-10

## 2023-05-10 RX ORDER — ZOLPIDEM TARTRATE 5 MG/1
5 TABLET ORAL
Qty: 30 TABLET | Refills: 0 | Status: SHIPPED | OUTPATIENT
Start: 2023-05-10

## 2023-05-10 RX ORDER — GLIPIZIDE 5 MG/1
5 TABLET, FILM COATED, EXTENDED RELEASE ORAL DAILY
Qty: 90 TABLET | Refills: 2 | Status: SHIPPED | OUTPATIENT
Start: 2023-05-10

## 2023-05-10 RX ORDER — LEVOTHYROXINE SODIUM 0.15 MG/1
150 TABLET ORAL DAILY
Qty: 90 TABLET | Refills: 2 | Status: SHIPPED | OUTPATIENT
Start: 2023-05-10

## 2023-05-10 RX ORDER — ASPIRIN 81 MG/1
81 TABLET ORAL DAILY
Qty: 90 TABLET | Refills: 2 | Status: SHIPPED | OUTPATIENT
Start: 2023-05-10

## 2023-05-10 RX ORDER — FENOFIBRATE 145 MG/1
145 TABLET, COATED ORAL DAILY
Qty: 90 TABLET | Refills: 1 | Status: SHIPPED | OUTPATIENT
Start: 2023-05-10

## 2023-05-10 RX ORDER — SPIRONOLACTONE 100 MG/1
100 TABLET, FILM COATED ORAL 2 TIMES DAILY
Qty: 180 TABLET | Refills: 3 | Status: SHIPPED | OUTPATIENT
Start: 2023-05-10

## 2023-05-10 NOTE — ASSESSMENT & PLAN NOTE
- Not doing well recently dreaming of her father who past   - Will restart Elle Kuo which has helped in the past

## 2023-06-15 DIAGNOSIS — Z78.9 TRANSGENDER: ICD-10-CM

## 2023-06-15 RX ORDER — ESTRADIOL VALERATE 40 MG/ML
20 INJECTION INTRAMUSCULAR
Qty: 15 ML | Refills: 2 | Status: CANCELLED | OUTPATIENT
Start: 2023-06-15

## 2023-06-15 RX ORDER — ESTRADIOL VALERATE 40 MG/ML
20 INJECTION INTRAMUSCULAR
Qty: 15 ML | Refills: 2 | Status: SHIPPED | OUTPATIENT
Start: 2023-06-15

## 2023-06-15 RX ORDER — SYRINGE WITH NEEDLE, 1 ML 25GX5/8"
SYRINGE, EMPTY DISPOSABLE MISCELLANEOUS
Qty: 30 EACH | Refills: 0 | Status: CANCELLED | OUTPATIENT
Start: 2023-06-15

## 2023-06-15 NOTE — TELEPHONE ENCOUNTER
Pt requested estradiol and syringe needles be sent to a new pharmacy  The pharmacy that is was originally sent to is unable to complete order         Pharmacy: [facility], [address]

## 2023-07-09 PROBLEM — Z12.12 SCREENING FOR COLORECTAL CANCER: Status: RESOLVED | Noted: 2023-05-10 | Resolved: 2023-07-09

## 2023-07-09 PROBLEM — Z12.11 SCREENING FOR COLORECTAL CANCER: Status: RESOLVED | Noted: 2023-05-10 | Resolved: 2023-07-09

## 2023-08-03 ENCOUNTER — TELEPHONE (OUTPATIENT)
Dept: FAMILY MEDICINE CLINIC | Facility: CLINIC | Age: 53
End: 2023-08-03

## 2023-08-03 NOTE — TELEPHONE ENCOUNTER
Good afternoon  I called patient to make her an appointment but you don't have any and she's moving to [place] she needs an appointment with you sometime from 9/15 to Oct 7, Oct 11 the latest     You seen her 5/10 and you wrote she needed to come back around 9/10 for DM2/HTN/HLD. Told patient I would call her back when I knew what to do. Please advise.

## 2023-09-12 DIAGNOSIS — F51.01 PRIMARY INSOMNIA: ICD-10-CM

## 2023-09-12 NOTE — PROGRESS NOTES
Name: Chitra Green      : 1970      MRN: [id number]  Encounter Provider: Josh Cotto MD  Encounter Date: 2023   Encounter department: [address] [facility]     1. Type 2 diabetes mellitus without complication, without long-term current use of insulin (HCC)  Assessment & Plan:    Lab Results   Component Value Date    HGBA1C 9.5 (H) 2023     - HBA1C continues to worsen  - Reviewed medications and adherence to them  - Discussed resuming lifestyle and dietary changes which helped significantly in the past.  - Would like to defer complete foot exam for next visit. - Needs ACE/ARB but has refused. Will discuss again on follow up visit. Orders:  -     metFORMIN (GLUCOPHAGE-XR) 750 mg 24 hr tablet; Take 1 tablet (750 mg total) by mouth daily with breakfast    2. HTN, goal below 140/90  Assessment & Plan:  - BP not at goal  - Asymptomatic   - Continue spironolactone 100mg BID      3. Hypertriglyceridemia  Assessment & Plan:  - Cholesterol and triglycerides increasing   - Advised to resume and take medications as prescribed      4. Acquired hypothyroidism  Assessment & Plan:  - TSH WNL   - continue levothyroxine 150 mcg once daily  - advised to obtain TSH ordered on last visit      5. Tobacco abuse  Assessment & Plan:  Counseled patient on the importance of smoking cessation. Counseling time was over 3 minutes. Patient acknowledges an understanding of the risks of smoking. At the very least, patient is agreeable to try cutting down. Discussed outside services to help with quitting smoking. Discussed the specifics of using nicotine patches, on a tapering schedule,       after stopping smoking. Tapering can last 6 months. While on the nicotine patches, I instructed on the use of PRN nicotine gum,       instead of reaching for a cigarette, in a moment of weakness. Orders:  -     nicotine polacrilex (NICORETTE) 4 mg gum;  Chew 1 each (4 mg total) as needed for smoking cessation  -     nicotine (NICODERM CQ) 21 mg/24 hr TD 24 hr patch; Place 1 patch on the skin over 24 hours every 24 hours    6. Chronic bilateral low back pain without sciatica  Assessment & Plan:  - Will provide a short course of tramadol as NSAIDs and tylenol have not provided relief. Orders:  -     traMADol (Ultram) 50 mg tablet; Take 1 tablet (50 mg total) by mouth daily as needed for moderate pain    BMI Counseling: Body mass index is 31.22 kg/m². The BMI is above normal. Nutrition recommendations include decreasing portion sizes, encouraging healthy choices of fruits and vegetables and decreasing fast food intake. Exercise recommendations include exercising 3-5 times per week. Rationale for BMI follow-up plan is due to patient being overweight or obese. Subjective      Doing well and complaint with medications. However, under a lot of stress lately with personal matters. Still exercising about 2 times a week. No other complaints. Review of Systems   Constitutional: Negative for fatigue and fever. HENT: Negative for congestion and sore throat. Eyes: Negative for visual disturbance. Respiratory: Negative for cough, shortness of breath and wheezing. Cardiovascular: Negative for chest pain, palpitations and leg swelling. Gastrointestinal: Negative for abdominal pain, anal bleeding, constipation, diarrhea, nausea and vomiting. Endocrine: Negative for cold intolerance and heat intolerance. Musculoskeletal: Positive for back pain. Negative for arthralgias and joint swelling. Skin: Negative for color change. Neurological: Negative for dizziness, seizures, syncope, weakness and light-headedness. Psychiatric/Behavioral: Negative for agitation, confusion, sleep disturbance and suicidal ideas.        Current Outpatient Medications on File Prior to Visit   Medication Sig   • acetaminophen (TYLENOL) 500 mg tablet Take 1 tablet (500 mg total) by mouth every 6 (six) hours as needed for mild pain   • aspirin (Aspirin Adult Low Strength) 81 mg EC tablet Take 1 tablet (81 mg total) by mouth daily   • Blood Glucose Monitoring Suppl (ONETOUCH VERIO FLEX SYSTEM) w/Device KIT by Other route daily   • cholecalciferol (VITAMIN D3) 1,000 units tablet Take 1,000 Units by mouth daily   • estradiol (ESTRACE) 2 MG tablet TAKE 1 TABLET BY MOUTH 2 TIMES A DAY. • estradiol valerate (DELESTROGEN) 40 MG/ML injection Inject 0.5 mL (20 mg total) into a muscle every 14 (fourteen) days   • fenofibrate (TRICOR) 145 mg tablet Take 1 tablet (145 mg total) by mouth daily   • glipiZIDE (GLUCOTROL XL) 5 mg 24 hr tablet Take 1 tablet (5 mg total) by mouth daily   • glucose blood (OneTouch Verio) test strip 1 each by Other route daily Use as instructed   • levothyroxine 150 mcg tablet Take 1 tablet (150 mcg total) by mouth daily   • naproxen (NAPROSYN) 500 mg tablet TAKE 1 TABLET BY MOUTH 2 TIMES A DAY AS NEEDED FOR MILD PAIN. • OneTouch Delica Lancets 35C MISC by Does not apply route daily   • rosuvastatin (CRESTOR) 20 MG tablet Take 1 tablet (20 mg total) by mouth daily   • spironolactone (ALDACTONE) 100 mg tablet Take 1 tablet (100 mg total) by mouth 2 (two) times a day   • SYRINGE-NEEDLE, DISP, 3 ML (SAFETY-LUCIANO 3CC SYR 21GX1.5") 21G X 1-1/2" 3 ML MISC Use as directed   • zolpidem (AMBIEN) 5 mg tablet Take 1 tablet (5 mg total) by mouth daily at bedtime as needed for sleep   • [DISCONTINUED] metFORMIN (GLUCOPHAGE) 500 mg tablet Take 1 tablet (500 mg total) by mouth 2 (two) times a day with meals       Objective     BP (!) 176/90 (BP Location: Left arm, Patient Position: Sitting, Cuff Size: Standard)   Pulse 101   Temp 97.6 °F (36.4 °C) (Temporal)   Resp 22   Ht 5' 10" (1.778 m)   Wt 98.7 kg (217 lb 9.6 oz)   SpO2 95%   BMI 31.22 kg/m²     Physical Exam  Constitutional:       Appearance: She is well-developed. HENT:      Head: Normocephalic and atraumatic.    Eyes: Conjunctiva/sclera: Conjunctivae normal.      Pupils: Pupils are equal, round, and reactive to light. Neck:      Vascular: No JVD. Cardiovascular:      Rate and Rhythm: Normal rate and regular rhythm. Heart sounds: Normal heart sounds. No murmur heard. No friction rub. No gallop. Pulmonary:      Effort: Pulmonary effort is normal. No respiratory distress. Breath sounds: Normal breath sounds. No wheezing. Abdominal:      General: Bowel sounds are normal. There is no distension. Palpations: Abdomen is soft. Tenderness: There is no abdominal tenderness. Musculoskeletal:         General: Normal range of motion. Cervical back: Normal range of motion and neck supple. Skin:     General: Skin is warm and dry. Neurological:      Mental Status: She is alert and oriented to person, place, and time. Deep Tendon Reflexes: Reflexes are normal and symmetric. Psychiatric:         Behavior: Behavior normal.         Thought Content:  Thought content normal.         Judgment: Judgment normal.       Fay Metzger MD

## 2023-09-12 NOTE — ASSESSMENT & PLAN NOTE
- TSH WNL   - continue levothyroxine 150 mcg once daily  - advised to obtain TSH ordered on last visit

## 2023-09-12 NOTE — ASSESSMENT & PLAN NOTE
Lab Results   Component Value Date    HGBA1C 9.5 (H) 05/04/2023     - HBA1C continues to worsen  - Reviewed medications and adherence to them  - Discussed resuming lifestyle and dietary changes which helped significantly in the past.  - Would like to defer complete foot exam for next visit. - Needs ACE/ARB but has refused. Will discuss again on follow up visit.

## 2023-09-13 ENCOUNTER — OFFICE VISIT (OUTPATIENT)
Dept: FAMILY MEDICINE CLINIC | Facility: CLINIC | Age: 53
End: 2023-09-13

## 2023-09-13 VITALS
RESPIRATION RATE: 22 BRPM | TEMPERATURE: 97.6 F | SYSTOLIC BLOOD PRESSURE: 176 MMHG | DIASTOLIC BLOOD PRESSURE: 90 MMHG | OXYGEN SATURATION: 95 % | HEART RATE: 101 BPM | HEIGHT: 70 IN | WEIGHT: 217.6 LBS | BODY MASS INDEX: 31.15 KG/M2

## 2023-09-13 DIAGNOSIS — E03.9 ACQUIRED HYPOTHYROIDISM: ICD-10-CM

## 2023-09-13 DIAGNOSIS — I10 HTN, GOAL BELOW 140/90: ICD-10-CM

## 2023-09-13 DIAGNOSIS — Z72.0 TOBACCO ABUSE: ICD-10-CM

## 2023-09-13 DIAGNOSIS — M54.50 CHRONIC BILATERAL LOW BACK PAIN WITHOUT SCIATICA: ICD-10-CM

## 2023-09-13 DIAGNOSIS — E78.1 HYPERTRIGLYCERIDEMIA: ICD-10-CM

## 2023-09-13 DIAGNOSIS — G89.29 CHRONIC BILATERAL LOW BACK PAIN WITHOUT SCIATICA: ICD-10-CM

## 2023-09-13 DIAGNOSIS — E11.9 TYPE 2 DIABETES MELLITUS WITHOUT COMPLICATION, WITHOUT LONG-TERM CURRENT USE OF INSULIN (HCC): Primary | ICD-10-CM

## 2023-09-13 PROCEDURE — 99214 OFFICE O/P EST MOD 30 MIN: CPT | Performed by: FAMILY MEDICINE

## 2023-09-13 RX ORDER — NICOTINE POLACRILEX 4 MG
GUM BUCCAL
Refills: 0 | OUTPATIENT
Start: 2023-09-13

## 2023-09-13 RX ORDER — NICOTINE 21 MG/24HR
1 PATCH, TRANSDERMAL 24 HOURS TRANSDERMAL EVERY 24 HOURS
Qty: 28 PATCH | Refills: 0 | Status: SHIPPED | OUTPATIENT
Start: 2023-09-13

## 2023-09-13 RX ORDER — TRAMADOL HYDROCHLORIDE 50 MG/1
50 TABLET ORAL DAILY PRN
Qty: 15 TABLET | Refills: 0 | Status: SHIPPED | OUTPATIENT
Start: 2023-09-13

## 2023-09-13 RX ORDER — METFORMIN HYDROCHLORIDE 750 MG/1
750 TABLET, EXTENDED RELEASE ORAL
Qty: 90 TABLET | Refills: 3 | Status: SHIPPED | OUTPATIENT
Start: 2023-09-13

## 2023-09-13 NOTE — ASSESSMENT & PLAN NOTE
Counseled patient on the importance of smoking cessation. Counseling time was over 3 minutes. Patient acknowledges an understanding of the risks of smoking. At the very least, patient is agreeable to try cutting down. Discussed outside services to help with quitting smoking. Discussed the specifics of using nicotine patches, on a tapering schedule,       after stopping smoking. Tapering can last 6 months. While on the nicotine patches, I instructed on the use of PRN nicotine gum,       instead of reaching for a cigarette, in a moment of weakness.

## 2023-09-14 RX ORDER — ZOLPIDEM TARTRATE 5 MG/1
5 TABLET ORAL
Qty: 30 TABLET | Refills: 0 | Status: SHIPPED | OUTPATIENT
Start: 2023-09-14

## 2023-10-02 DIAGNOSIS — J40 BRONCHITIS: Primary | ICD-10-CM

## 2023-10-02 RX ORDER — AZITHROMYCIN 250 MG/1
TABLET, FILM COATED ORAL
Qty: 6 TABLET | Refills: 0 | Status: SHIPPED | OUTPATIENT
Start: 2023-10-02 | End: 2023-10-06

## 2023-10-02 NOTE — PROGRESS NOTES
Patient called and informed me that she has been sick for over a week with very little improvement in her URI symptoms. We have used azithromycin in the past for similar presentation and has worked. Will trial again. Patient know to call back if symptoms worsen or do not improve.

## 2023-10-08 DIAGNOSIS — G44.209 TENSION HEADACHE: ICD-10-CM

## 2023-10-09 RX ORDER — NAPROXEN 500 MG/1
TABLET ORAL
Qty: 60 TABLET | Refills: 0 | Status: SHIPPED | OUTPATIENT
Start: 2023-10-09

## 2023-10-14 DIAGNOSIS — Z72.0 TOBACCO ABUSE: ICD-10-CM

## 2023-11-16 ENCOUNTER — TELEPHONE (OUTPATIENT)
Dept: FAMILY MEDICINE CLINIC | Facility: CLINIC | Age: 53
End: 2023-11-16

## 2023-11-16 DIAGNOSIS — N30.00 ACUTE CYSTITIS WITHOUT HEMATURIA: Primary | ICD-10-CM

## 2023-11-16 RX ORDER — SULFAMETHOXAZOLE AND TRIMETHOPRIM 800; 160 MG/1; MG/1
1 TABLET ORAL 2 TIMES DAILY
Qty: 6 TABLET | Refills: 0 | Status: SHIPPED | OUTPATIENT
Start: 2023-11-16 | End: 2023-11-19

## 2023-11-16 NOTE — TELEPHONE ENCOUNTER
Patient called me reporting burning and pain on urination for the last 2 days and reports that she is usually gets bactrim empirically from her OB doctor when she has these symptoms. Considering this I will send prescription for bactrim for patient.

## 2023-11-19 ENCOUNTER — APPOINTMENT (EMERGENCY)
Dept: CT IMAGING | Facility: HOSPITAL | Age: 53
End: 2023-11-19
Payer: COMMERCIAL

## 2023-11-19 ENCOUNTER — HOSPITAL ENCOUNTER (EMERGENCY)
Facility: HOSPITAL | Age: 53
Discharge: HOME | End: 2023-11-19
Attending: EMERGENCY MEDICINE | Admitting: EMERGENCY MEDICINE
Payer: COMMERCIAL

## 2023-11-19 VITALS
RESPIRATION RATE: 18 BRPM | DIASTOLIC BLOOD PRESSURE: 90 MMHG | OXYGEN SATURATION: 92 % | HEART RATE: 86 BPM | WEIGHT: 211.64 LBS | BODY MASS INDEX: 30.37 KG/M2 | TEMPERATURE: 97.5 F | SYSTOLIC BLOOD PRESSURE: 197 MMHG

## 2023-11-19 DIAGNOSIS — E87.1 HYPONATREMIA: ICD-10-CM

## 2023-11-19 DIAGNOSIS — M54.9 MUSCULOSKELETAL BACK PAIN: Primary | ICD-10-CM

## 2023-11-19 LAB
ANION GAP SERPL CALCULATED.3IONS-SCNC: 12 MMOL/L
BACTERIA UR QL AUTO: ABNORMAL /HPF
BASOPHILS # BLD AUTO: 0.07 THOUSANDS/ÂΜL (ref 0–0.1)
BASOPHILS NFR BLD AUTO: 1 % (ref 0–1)
BILIRUB UR QL STRIP: NEGATIVE
BUN SERPL-MCNC: 9 MG/DL (ref 5–25)
CALCIUM SERPL-MCNC: 8.9 MG/DL (ref 8.4–10.2)
CHLORIDE SERPL-SCNC: 94 MMOL/L (ref 96–108)
CLARITY UR: CLEAR
CO2 SERPL-SCNC: 19 MMOL/L (ref 21–32)
COLOR UR: YELLOW
CREAT SERPL-MCNC: 0.74 MG/DL (ref 0.6–1.3)
EOSINOPHIL # BLD AUTO: 0.25 THOUSAND/ÂΜL (ref 0–0.61)
EOSINOPHIL NFR BLD AUTO: 3 % (ref 0–6)
ERYTHROCYTE [DISTWIDTH] IN BLOOD BY AUTOMATED COUNT: 13.1 % (ref 11.6–15.1)
GLUCOSE SERPL-MCNC: 288 MG/DL (ref 65–140)
GLUCOSE UR STRIP-MCNC: ABNORMAL MG/DL
HCT VFR BLD AUTO: 38.5 % (ref 36.5–46.1)
HGB BLD-MCNC: 13.4 G/DL (ref 12–15.4)
HGB UR QL STRIP.AUTO: NEGATIVE
HYALINE CASTS #/AREA URNS LPF: ABNORMAL /LPF
IMM GRANULOCYTES # BLD AUTO: 0.01 THOUSAND/UL (ref 0–0.2)
IMM GRANULOCYTES NFR BLD AUTO: 0 % (ref 0–2)
KETONES UR STRIP-MCNC: ABNORMAL MG/DL
LEUKOCYTE ESTERASE UR QL STRIP: ABNORMAL
LYMPHOCYTES # BLD AUTO: 3.46 THOUSANDS/ÂΜL (ref 0.6–4.47)
LYMPHOCYTES NFR BLD AUTO: 45 % (ref 14–44)
MCH RBC QN AUTO: 31.3 PG (ref 26.8–34.3)
MCHC RBC AUTO-ENTMCNC: 34.8 G/DL (ref 31.4–37.4)
MCV RBC AUTO: 90 FL (ref 82–98)
MONOCYTES # BLD AUTO: 0.61 THOUSAND/ÂΜL (ref 0.17–1.22)
MONOCYTES NFR BLD AUTO: 8 % (ref 4–12)
MUCOUS THREADS UR QL AUTO: ABNORMAL
NEUTROPHILS # BLD AUTO: 3.37 THOUSANDS/ÂΜL (ref 1.85–7.62)
NEUTS SEG NFR BLD AUTO: 43 % (ref 43–75)
NITRITE UR QL STRIP: NEGATIVE
NON-SQ EPI CELLS URNS QL MICRO: ABNORMAL /HPF
NRBC BLD AUTO-RTO: 0 /100 WBCS
PH UR STRIP.AUTO: 6 [PH] (ref 4.5–8)
PLATELET # BLD AUTO: 360 THOUSANDS/UL (ref 149–390)
PMV BLD AUTO: 10.3 FL (ref 8.9–12.7)
POTASSIUM SERPL-SCNC: 3.7 MMOL/L (ref 3.5–5.3)
PROT UR STRIP-MCNC: >=300 MG/DL
RBC # BLD AUTO: 4.28 MILLION/UL (ref 3.88–5.12)
RBC #/AREA URNS AUTO: ABNORMAL /HPF
SODIUM SERPL-SCNC: 125 MMOL/L (ref 135–147)
SP GR UR STRIP.AUTO: >=1.03 (ref 1–1.03)
UROBILINOGEN UR QL STRIP.AUTO: 0.2 E.U./DL
WBC # BLD AUTO: 7.77 THOUSAND/UL (ref 4.31–10.16)
WBC #/AREA URNS AUTO: ABNORMAL /HPF

## 2023-11-19 PROCEDURE — 85025 COMPLETE CBC W/AUTO DIFF WBC: CPT | Performed by: EMERGENCY MEDICINE

## 2023-11-19 PROCEDURE — 74176 CT ABD & PELVIS W/O CONTRAST: CPT

## 2023-11-19 PROCEDURE — 81001 URINALYSIS AUTO W/SCOPE: CPT

## 2023-11-19 PROCEDURE — 96361 HYDRATE IV INFUSION ADD-ON: CPT

## 2023-11-19 PROCEDURE — G1004 CDSM NDSC: HCPCS

## 2023-11-19 PROCEDURE — 36415 COLL VENOUS BLD VENIPUNCTURE: CPT | Performed by: EMERGENCY MEDICINE

## 2023-11-19 PROCEDURE — 99285 EMERGENCY DEPT VISIT HI MDM: CPT | Performed by: EMERGENCY MEDICINE

## 2023-11-19 PROCEDURE — 99284 EMERGENCY DEPT VISIT MOD MDM: CPT

## 2023-11-19 PROCEDURE — 80048 BASIC METABOLIC PNL TOTAL CA: CPT | Performed by: EMERGENCY MEDICINE

## 2023-11-19 PROCEDURE — 96374 THER/PROPH/DIAG INJ IV PUSH: CPT

## 2023-11-19 RX ORDER — GABAPENTIN 300 MG/1
300 CAPSULE ORAL ONCE
Status: COMPLETED | OUTPATIENT
Start: 2023-11-19 | End: 2023-11-19

## 2023-11-19 RX ORDER — GABAPENTIN 300 MG/1
300 CAPSULE ORAL 3 TIMES DAILY PRN
Qty: 30 CAPSULE | Refills: 0 | Status: SHIPPED | OUTPATIENT
Start: 2023-11-19

## 2023-11-19 RX ORDER — METHOCARBAMOL 500 MG/1
1000 TABLET, FILM COATED ORAL ONCE
Status: DISCONTINUED | OUTPATIENT
Start: 2023-11-19 | End: 2023-11-19 | Stop reason: HOSPADM

## 2023-11-19 RX ORDER — KETOROLAC TROMETHAMINE 30 MG/ML
15 INJECTION, SOLUTION INTRAMUSCULAR; INTRAVENOUS ONCE
Status: COMPLETED | OUTPATIENT
Start: 2023-11-19 | End: 2023-11-19

## 2023-11-19 RX ORDER — METHOCARBAMOL 750 MG/1
750 TABLET, FILM COATED ORAL 3 TIMES DAILY PRN
Qty: 30 TABLET | Refills: 0 | Status: SHIPPED | OUTPATIENT
Start: 2023-11-19

## 2023-11-19 RX ADMIN — KETOROLAC TROMETHAMINE 15 MG: 30 INJECTION, SOLUTION INTRAMUSCULAR; INTRAVENOUS at 03:14

## 2023-11-19 RX ADMIN — GABAPENTIN 300 MG: 300 CAPSULE ORAL at 04:44

## 2023-11-19 RX ADMIN — SODIUM CHLORIDE 1000 ML: 0.9 INJECTION, SOLUTION INTRAVENOUS at 03:52

## 2023-11-19 NOTE — ED PROVIDER NOTES
History  Chief Complaint   Patient presents with    Back Pain     Pt reports pain in her lower back and in left flank that started 2 weeks ago. Pain now also in right side of neck       27-year-old male to female transgender who presents with left-sided back pain. Has been ongoing for the past 2 weeks. Left flank pain radiating into the left side of her abdomen. No associated nausea/vomiting. Taking tramadol and naproxen without relief. Pain seems to be made worse with movement. No  complaints. Prior to Admission Medications   Prescriptions Last Dose Informant Patient Reported? Taking? Blood Glucose Monitoring Suppl ([address] [place] [address]) w/Device KIT  Self No Yes   Sig: by Other route daily   OneTouch Delica Lancets 43L MISC Not Taking Self No No   Sig: by Does not apply route daily   Patient not taking: Reported on 11/19/2023   SYRINGE-NEEDLE, DISP, 3 ML (SAFETY-LUCIANO 3CC SYR 21GX1.5") 21G X 1-1/2" 3 ML MISC Not Taking Self No No   Sig: Use as directed   Patient not taking: Reported on 11/19/2023   acetaminophen (TYLENOL) 500 mg tablet  Self No Yes   Sig: Take 1 tablet (500 mg total) by mouth every 6 (six) hours as needed for mild pain   aspirin (Aspirin Adult Low Strength) 81 mg EC tablet   No Yes   Sig: Take 1 tablet (81 mg total) by mouth daily   cholecalciferol (VITAMIN D3) 1,000 units tablet Not Taking Self Yes No   Sig: Take 1,000 Units by mouth daily   Patient not taking: Reported on 11/19/2023   estradiol (ESTRACE) 2 MG tablet Not Taking  No No   Sig: TAKE 1 TABLET BY MOUTH 2 TIMES A DAY.    Patient not taking: Reported on 11/19/2023   estradiol valerate (DELESTROGEN) 40 MG/ML injection Not Taking  No No   Sig: Inject 0.5 mL (20 mg total) into a muscle every 14 (fourteen) days   Patient not taking: Reported on 11/19/2023   fenofibrate (TRICOR) 145 mg tablet Not Taking  No No   Sig: Take 1 tablet (145 mg total) by mouth daily   Patient not taking: Reported on 11/19/2023   glipiZIDE (GLUCOTROL XL) 5 mg 24 hr tablet   No Yes   Sig: Take 1 tablet (5 mg total) by mouth daily   glucose blood (OneTouch Verio) test strip Not Taking Self No No   Si each by Other route daily Use as instructed   Patient not taking: Reported on 2023   levothyroxine 150 mcg tablet   No Yes   Sig: Take 1 tablet (150 mcg total) by mouth daily   metFORMIN (GLUCOPHAGE-XR) 750 mg 24 hr tablet   No Yes   Sig: Take 1 tablet (750 mg total) by mouth daily with breakfast   naproxen (NAPROSYN) 500 mg tablet   No Yes   Sig: TAKE 1 TABLET BY MOUTH 2 TIMES A DAY AS NEEDED FOR MILD PAIN   nicotine (NICODERM CQ) 21 mg/24 hr TD 24 hr patch Not Taking  No No   Sig: Place 1 patch on the skin over 24 hours every 24 hours   Patient not taking: Reported on 2023   nicotine polacrilex (NICORETTE) 4 mg gum Not Taking  No No   Sig: CHEW 1 EACH (4 MG TOTAL) AS NEEDED FOR SMOKING CESSATION   Patient not taking: Reported on 2023   rosuvastatin (CRESTOR) 20 MG tablet   No Yes   Sig: Take 1 tablet (20 mg total) by mouth daily   spironolactone (ALDACTONE) 100 mg tablet   No Yes   Sig: Take 1 tablet (100 mg total) by mouth 2 (two) times a day   sulfamethoxazole-trimethoprim (BACTRIM DS) 800-160 mg per tablet Not Taking  No No   Sig: Take 1 tablet by mouth 2 (two) times a day for 3 days   Patient not taking: Reported on 2023   traMADol (Ultram) 50 mg tablet Not Taking  No No   Sig: Take 1 tablet (50 mg total) by mouth daily as needed for moderate pain   Patient not taking: Reported on 2023   zolpidem (AMBIEN) 5 mg tablet   No Yes   Sig: TAKE 1 TABLET BY MOUTH DAILY AT BEDTIME AS NEEDED FOR SLEEP      Facility-Administered Medications: None       Past Medical History:   Diagnosis Date    Chronic pain of both knees 2019    Diabetes mellitus ([address])     Seizure disorder ([address])     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 E-Cigarette/Vaping    E-Cigarette Use Never User      E-Cigarette/Vaping Substances    Nicotine No     THC No     CBD No     Flavoring No     Other No     Unknown No      Social History     Tobacco Use    Smoking status: Every Day     Packs/day: 0.50     Types: Cigarettes     Passive exposure: Current    Smokeless tobacco: Never    Tobacco comments:     10 packs/80.00 year   Vaping Use    Vaping Use: Never used   Substance Use Topics    Alcohol use: Yes     Alcohol/week: 1.0 standard drink of alcohol     Types: 1 Glasses of wine per week     Comment: social    Drug use: No       Review of Systems   Constitutional:  Negative for chills, fatigue and fever. HENT:  Negative for rhinorrhea, sore throat and trouble swallowing. Eyes:  Negative for photophobia and visual disturbance. Respiratory:  Negative for cough, chest tightness and shortness of breath. Cardiovascular:  Negative for chest pain, palpitations and leg swelling. Gastrointestinal:  Negative for abdominal pain, blood in stool, diarrhea, nausea and vomiting. Endocrine: Negative for polyuria. Genitourinary:  Positive for flank pain. Negative for dysuria and hematuria. Musculoskeletal:  Positive for back pain. Negative for neck pain. Skin:  Negative for color change and rash. Allergic/Immunologic: Negative for immunocompromised state. Neurological:  Negative for dizziness, weakness, light-headedness, numbness and headaches. All other systems reviewed and are negative. Physical Exam  Physical Exam  Vitals and nursing note reviewed. Constitutional:       General: She is not in acute distress. Appearance: She is well-developed. HENT:      Head: Normocephalic and atraumatic. Mouth/Throat:      Lips: Pink. Mouth: Mucous membranes are moist.   Eyes:      General: Lids are normal.      Extraocular Movements: Extraocular movements intact. Conjunctiva/sclera: Conjunctivae normal.      Pupils: Pupils are equal, round, and reactive to light. Cardiovascular:      Rate and Rhythm: Regular rhythm. Tachycardia present. Heart sounds: Normal heart sounds. No murmur heard. Pulmonary:      Effort: Pulmonary effort is normal.      Breath sounds: Normal breath sounds. Abdominal:      General: There is no distension. Palpations: Abdomen is soft. Tenderness: There is no abdominal tenderness. There is left CVA tenderness. There is no right CVA tenderness, guarding or rebound. Musculoskeletal:         General: No swelling. Cervical back: Full passive range of motion without pain, normal range of motion and neck supple. Comments: Tenderness left-sided paraspinal lumbar muscles. Skin:     General: Skin is warm. Capillary Refill: Capillary refill takes less than 2 seconds. Findings: No rash. Neurological:      General: No focal deficit present. Mental Status: She is alert.    Psychiatric:         Mood and Affect: Mood normal.         Speech: Speech normal.         Behavior: Behavior normal.         Vital Signs  ED Triage Vitals [11/19/23 0132]   Temperature Pulse Respirations Blood Pressure SpO2   97.5 °F (36.4 °C) 100 18 134/66 98 %      Temp Source Heart Rate Source Patient Position - Orthostatic VS BP Location FiO2 (%)   Oral Monitor Sitting Right arm --      Pain Score       8           Vitals:    11/19/23 0330 11/19/23 0400 11/19/23 0430 11/19/23 0500   BP: (!) 199/102 (!) 200/103 (!) 187/87 (!) 197/90   Pulse: 86 92 92 86   Patient Position - Orthostatic VS: Lying Lying Lying Lying         Visual Acuity      ED Medications  Medications   methocarbamol (ROBAXIN) tablet 1,000 mg (1,000 mg Oral Not Given 11/19/23 0446)   ketorolac (TORADOL) injection 15 mg (15 mg Intravenous Given 11/19/23 0314)   sodium chloride 0.9 % bolus 1,000 mL (0 mL Intravenous Stopped 11/19/23 0533)   gabapentin (NEURONTIN) capsule 300 mg (300 mg Oral Given 11/19/23 0444)       Diagnostic Studies  Results Reviewed       Procedure Component Value Units Date/Time    Urine Microscopic [[id number]]  (Abnormal) Collected: 11/19/23 0316    Lab Status: Final result Specimen: Urine, Clean Catch Updated: 11/19/23 0338     RBC, UA 1-2 /hpf      WBC, UA 2-4 /hpf      Epithelial Cells Occasional /hpf      Bacteria, UA None Seen /hpf      MUCUS THREADS Moderate     Hyaline Casts, UA 5-10 /lpf     Basic metabolic panel [[id number]]  (Abnormal) Collected: 11/19/23 0313    Lab Status: Final result Specimen: Blood from Arm, Right Updated: 11/19/23 0335     Sodium 125 mmol/L      Potassium 3.7 mmol/L      Chloride 94 mmol/L      CO2 19 mmol/L      ANION GAP 12 mmol/L      BUN 9 mg/dL      Creatinine 0.74 mg/dL      Glucose 288 mg/dL      Calcium 8.9 mg/dL      eGFR --    Narrative:      Specimen Lipemic. Notes:     1. eGFR calculation is only valid for adults 18 years and older. 2. EGFR calculation cannot be performed for patients who are transgender, non-binary, or whose legal sex, sex at birth, and gender identity differ.     CBC and differential [[id number]]  (Abnormal) Collected: 11/19/23 0313    Lab Status: Final result Specimen: Blood from Arm, Right Updated: 11/19/23 0321     WBC 7.77 Thousand/uL      RBC 4.28 Million/uL      Hemoglobin 13.4 g/dL      Hematocrit 38.5 %      MCV 90 fL      MCH 31.3 pg      MCHC 34.8 g/dL      RDW 13.1 %      MPV 10.3 fL      Platelets 378 Thousands/uL      nRBC 0 /100 WBCs      Neutrophils Relative 43 %      Immat GRANS % 0 %      Lymphocytes Relative 45 %      Monocytes Relative 8 %      Eosinophils Relative 3 %      Basophils Relative 1 %      Neutrophils Absolute 3.37 Thousands/µL      Immature Grans Absolute 0.01 Thousand/uL      Lymphocytes Absolute 3.46 Thousands/µL      Monocytes Absolute 0.61 Thousand/µL      Eosinophils Absolute 0.25 Thousand/µL      Basophils Absolute 0.07 Thousands/µL     Urine Macroscopic, POC [[id number]]  (Abnormal) Collected: 11/19/23 0316    Lab Status: Final result Specimen: Urine Updated: 11/19/23 0319     Color, UA Yellow     Clarity, UA Clear     pH, UA 6.0     Leukocytes, UA Trace     Nitrite, UA Negative     Protein, UA >=300 mg/dl      Glucose,  (1/2%) mg/dl      Ketones, UA Trace mg/dl      Urobilinogen, UA 0.2 E.U./dl      Bilirubin, UA Negative     Occult Blood, UA Negative     Specific Gravity, UA >=1.030    Narrative:      CLINITEK RESULT                   CT renal stone study abdomen pelvis without contrast   Final Result by Mely Mckeon MD (11/19 0916)      No evidence of obstructive uropathy. Hepatomegaly and hepatic steatosis. Workstation performed: [id number]                    Procedures  Procedures         ED Course  ED Course as of 11/19/23 0549   Sun Nov 19, 2023   4280 Sodium(!): 125  Na is 130 corrected for glucose. 6535 CT renal stone study abdomen pelvis without contrast  No acute intra-abdominal pathology as interpreted by myself. SBIRT 20yo+      Flowsheet Row Most Recent Value   Initial Alcohol Screen: US AUDIT-C     1. How often do you have a drink containing alcohol? 0 Filed at: 11/19/2023 0135   2. How many drinks containing alcohol do you have on a typical day you are drinking? 0 Filed at: 11/19/2023 0135   3a. Male UNDER 65: How often do you have five or more drinks on one occasion? 0 Filed at: 11/19/2023 0135   3b. FEMALE Any Age, or MALE 65+: How often do you have 4 or more drinks on one occassion? 0 Filed at: 11/19/2023 0135   Audit-C Score 0 Filed at: 11/19/2023 8251   SILVIA: How many times in the past year have you. .. Used an illegal drug or used a prescription medication for non-medical reasons?  Never Filed at: 11/19/2023 [address] Medical Decision Making  Likely musculoskeletal back pain. However, patient does appear uncomfortable. Possible kidney stone versus pyelonephritis. Doubt diverticulitis. -CBC to evaluate for marked leukocytosis or anemia. -BMP to evaluate renal function in the setting of possible kidney stone or pyelonephritis. -CT renal stone study to evaluate for previously stated differential.  -Urinalysis to evaluate for UTI. -IV Toradol for pain. Problems Addressed:  Hyponatremia: undiagnosed new problem with uncertain prognosis  Musculoskeletal back pain: self-limited or minor problem    Amount and/or Complexity of Data Reviewed  Labs: ordered. Decision-making details documented in ED Course. Radiology: ordered and independent interpretation performed. Decision-making details documented in ED Course. Risk  Prescription drug management. Disposition  Final diagnoses:   Musculoskeletal back pain   Hyponatremia     Time reflects when diagnosis was documented in both MDM as applicable and the Disposition within this note       Time User Action Codes Description Comment    11/19/2023  5:47 AM Matthew Mccormick Add [M54.9] Musculoskeletal back pain     11/19/2023  5:48 AM Matthew Mccormick Add [E87.1] Hyponatremia           ED Disposition       ED Disposition   Discharge    Condition   Stable    Date/Time   Hoffman Nov 19, 2023 [address] [place] [address] Mackenzie discharge to home/self care.                    Follow-up Information       Follow up With Specialties Details Why Contact Info Additional 821 Fred Perdomo MD Family Medicine Schedule an appointment as soon as possible for a visit   1015 [facility] 1350 [facility] 128 [facility]       23[address]99 [facility] Emergency Department Emergency Medicine Go to  If symptoms worsen [address] [address]  1303 [facility] Emergency Department, [address] [place], [place], [address]            Patient's Medications   Discharge Prescriptions    GABAPENTIN (NEURONTIN) 300 MG CAPSULE    Take 1 capsule (300 mg total) by mouth 3 (three) times a day as needed (back pain)       Start Date: 11/19/2023End Date: --       Order Dose: 300 mg       Quantity: 30 capsule    Refills: 0    METHOCARBAMOL (ROBAXIN) 750 MG TABLET    Take 1 tablet (750 mg total) by mouth 3 (three) times a day as needed for muscle spasms       Start Date: 11/19/2023End Date: --       Order Dose: 750 mg       Quantity: 30 tablet    Refills: 0       No discharge procedures on file.     PDMP Review         Value Time User    PDMP Reviewed  Yes 9/13/2023  1:37 PM Fay Metzger MD            ED Provider  Electronically Signed by             Heather Douglas MD  11/19/23 3856

## 2023-12-08 DIAGNOSIS — Z72.0 TOBACCO ABUSE: ICD-10-CM

## 2023-12-08 RX ORDER — NICOTINE 21 MG/24HR
1 PATCH, TRANSDERMAL 24 HOURS TRANSDERMAL EVERY 24 HOURS
Qty: 28 PATCH | Refills: 3 | Status: SHIPPED | OUTPATIENT
Start: 2023-12-08

## 2023-12-12 NOTE — ASSESSMENT & PLAN NOTE
- BP not at goal  - Did not take her spironolactone in one week.    - Asymptomatic   - Refusing anymore medications at the moment  - Continue spironolactone 100mg BID

## 2023-12-12 NOTE — ASSESSMENT & PLAN NOTE
Lab Results   Component Value Date    HGBA1C 12.3 (A) 12/13/2023     - HBA1C continues to worsen  - Reviewed medications and adherence   - Discussed resuming lifestyle and dietary changes which helped significantly in the past.  - Needs ACE/ARB but has refused. Will discuss again on follow up visit.

## 2023-12-12 NOTE — PROGRESS NOTES
Name: Von Simons      : 1970      MRN: [id number]  Encounter Provider: Juwan Sibley MD  Encounter Date: 2023   Encounter department: [address] [facility],[address]     1. Type 2 diabetes mellitus without complication, without long-term current use of insulin (HCC)  Assessment & Plan:    Lab Results   Component Value Date    HGBA1C 12.3 (A) 2023     - HBA1C continues to worsen  - Reviewed medications and adherence   - Discussed resuming lifestyle and dietary changes which helped significantly in the past.  - Needs ACE/ARB but has refused. Will discuss again on follow up visit. Orders:  -     POCT hemoglobin A1c    2. Acquired hypothyroidism  Assessment & Plan:  - TSH WNL   - continue levothyroxine 150 mcg once daily  - advised to obtain TSH ordered on last visit      3. HTN, goal below 140/90  Assessment & Plan:  - BP not at goal  - Did not take her spironolactone in one week. - Asymptomatic   - Refusing anymore medications at the moment  - Continue spironolactone 100mg BID      4. Hypertriglyceridemia  Assessment & Plan:  - Cholesterol and triglycerides increasing   - Advised to resume and take medications as prescribed      5. Acute bilateral thoracic back pain  Assessment & Plan:  - Will obtain xray to rule out fracture or other pathology  - Continue NSAIDs, muscle relaxant and add tramadol for breakthrough pain. - Will give toradol 60mg IM today   - PT referral given for further evaluation    Orders:  -     ketorolac (TORADOL) 60 mg/2 mL IM injection 60 mg  -     XR spine thoracic 3 vw; Future; Expected date: 2023  -     Ambulatory Referral to Physical Therapy; Future  -     cyclobenzaprine (FLEXERIL) 10 mg tablet; Take 1 tablet (10 mg total) by mouth 3 (three) times a day as needed for muscle spasms    6.  Other constipation  Assessment & Plan:  - Continue oral hydration   - will trial colace 100mg BID    Orders:  - docusate sodium (COLACE) 100 mg capsule; Take 1 capsule (100 mg total) by mouth 2 (two) times a day    7. Chronic bilateral low back pain without sciatica  -     traMADol (Ultram) 50 mg tablet; Take 1 tablet (50 mg total) by mouth daily as needed for moderate pain    8. Tension headache  -     naproxen (NAPROSYN) 500 mg tablet; Take 1 tablet by mouth 2 times a day           Subjective      Doing well but has not been taking her medications as prescribed due to her back pain for the last 3 months. No other complaints. Back Pain  This is a chronic problem. The current episode started more than 1 month ago. The problem occurs constantly. The problem is unchanged. The pain is present in the thoracic spine. The quality of the pain is described as aching. The pain does not radiate. The pain is at a severity of 6/10. The pain is moderate. The pain is The same all the time. Pertinent negatives include no abdominal pain, chest pain, fever or weakness. Risk factors include lack of exercise and sedentary lifestyle. She has tried NSAIDs, muscle relaxant and heat for the symptoms. The treatment provided mild relief. Review of Systems   Constitutional:  Negative for fatigue and fever. HENT:  Negative for congestion and sore throat. Eyes:  Negative for visual disturbance. Respiratory:  Negative for cough, shortness of breath and wheezing. Cardiovascular:  Negative for chest pain, palpitations and leg swelling. Gastrointestinal:  Negative for abdominal pain, anal bleeding, constipation, diarrhea, nausea and vomiting. Endocrine: Negative for cold intolerance and heat intolerance. Musculoskeletal:  Positive for back pain. Negative for arthralgias and joint swelling. Skin:  Negative for color change. Neurological:  Negative for dizziness, seizures, syncope, weakness and light-headedness. Psychiatric/Behavioral:  Negative for agitation, confusion, sleep disturbance and suicidal ideas.         Current Outpatient Medications on File Prior to Visit   Medication Sig    acetaminophen (TYLENOL) 500 mg tablet Take 1 tablet (500 mg total) by mouth every 6 (six) hours as needed for mild pain    aspirin (Aspirin Adult Low Strength) 81 mg EC tablet Take 1 tablet (81 mg total) by mouth daily    Blood Glucose Monitoring Suppl (ONETOUCH VERIO FLEX SYSTEM) w/Device KIT by Other route daily    cholecalciferol (VITAMIN D3) 1,000 units tablet Take 1,000 Units by mouth daily (Patient not taking: Reported on 11/19/2023)    estradiol (ESTRACE) 2 MG tablet TAKE 1 TABLET BY MOUTH 2 TIMES A DAY.  (Patient not taking: Reported on 11/19/2023)    estradiol valerate (DELESTROGEN) 40 MG/ML injection Inject 0.5 mL (20 mg total) into a muscle every 14 (fourteen) days (Patient not taking: Reported on 11/19/2023)    fenofibrate (TRICOR) 145 mg tablet Take 1 tablet (145 mg total) by mouth daily (Patient not taking: Reported on 11/19/2023)    gabapentin (Neurontin) 300 mg capsule Take 1 capsule (300 mg total) by mouth 3 (three) times a day as needed (back pain)    glipiZIDE (GLUCOTROL XL) 5 mg 24 hr tablet Take 1 tablet (5 mg total) by mouth daily    glucose blood (OneTouch Verio) test strip 1 each by Other route daily Use as instructed (Patient not taking: Reported on 11/19/2023)    levothyroxine 150 mcg tablet Take 1 tablet (150 mcg total) by mouth daily    metFORMIN (GLUCOPHAGE-XR) 750 mg 24 hr tablet Take 1 tablet (750 mg total) by mouth daily with breakfast    nicotine (NICODERM CQ) 21 mg/24 hr TD 24 hr patch PLACE 1 PATCH ON THE SKIN OVER 24 HOURS EVERY 24 HOURS    nicotine polacrilex (NICORETTE) 4 mg gum CHEW 1 EACH (4 MG TOTAL) AS NEEDED FOR SMOKING CESSATION (Patient not taking: Reported on 83/37/0476)    OneTouch Delica Lancets 80P MISC by Does not apply route daily (Patient not taking: Reported on 11/19/2023)    rosuvastatin (CRESTOR) 20 MG tablet Take 1 tablet (20 mg total) by mouth daily    spironolactone (ALDACTONE) 100 mg tablet Take 1 tablet (100 mg total) by mouth 2 (two) times a day    SYRINGE-NEEDLE, DISP, 3 ML (SAFETY-LUCIANO 3CC SYR 21GX1.5") 21G X 1-1/2" 3 ML MISC Use as directed (Patient not taking: Reported on 11/19/2023)    zolpidem (AMBIEN) 5 mg tablet TAKE 1 TABLET BY MOUTH DAILY AT BEDTIME AS NEEDED FOR SLEEP    [DISCONTINUED] methocarbamol (ROBAXIN) 750 mg tablet Take 1 tablet (750 mg total) by mouth 3 (three) times a day as needed for muscle spasms    [DISCONTINUED] naproxen (NAPROSYN) 500 mg tablet TAKE 1 TABLET BY MOUTH 2 TIMES A DAY AS NEEDED FOR MILD PAIN    [DISCONTINUED] traMADol (Ultram) 50 mg tablet Take 1 tablet (50 mg total) by mouth daily as needed for moderate pain (Patient not taking: Reported on 11/19/2023)       Objective     /80 (BP Location: Left arm, Patient Position: Sitting, Cuff Size: Large)   Temp (!) 97.1 °F (36.2 °C)   Resp 16   Ht 5' 10" (1.778 m)   Wt 91.2 kg (201 lb)   BMI 28.84 kg/m²     Physical Exam  Constitutional:       Appearance: She is well-developed. HENT:      Head: Normocephalic and atraumatic. Eyes:      Conjunctiva/sclera: Conjunctivae normal.      Pupils: Pupils are equal, round, and reactive to light. Neck:      Vascular: No JVD. Cardiovascular:      Rate and Rhythm: Normal rate and regular rhythm. Heart sounds: Normal heart sounds. No murmur heard. No friction rub. No gallop. Pulmonary:      Effort: Pulmonary effort is normal. No respiratory distress. Breath sounds: Normal breath sounds. No wheezing. Abdominal:      General: Bowel sounds are normal. There is no distension. Palpations: Abdomen is soft. Tenderness: There is no abdominal tenderness. Musculoskeletal:         General: Normal range of motion. Cervical back: Normal range of motion and neck supple. Thoracic back: Spasms and tenderness present. No swelling, edema, deformity, signs of trauma, lacerations or bony tenderness. Normal range of motion. No scoliosis.    Skin: General: Skin is warm and dry. Neurological:      Mental Status: She is alert and oriented to person, place, and time. Deep Tendon Reflexes: Reflexes are normal and symmetric. Psychiatric:         Behavior: Behavior normal.         Thought Content:  Thought content normal.         Judgment: Judgment normal.     Donte Baugh MD

## 2023-12-13 ENCOUNTER — OFFICE VISIT (OUTPATIENT)
Dept: FAMILY MEDICINE CLINIC | Facility: CLINIC | Age: 53
End: 2023-12-13

## 2023-12-13 ENCOUNTER — HOSPITAL ENCOUNTER (OUTPATIENT)
Dept: RADIOLOGY | Facility: HOSPITAL | Age: 53
Discharge: HOME | End: 2023-12-13
Payer: COMMERCIAL

## 2023-12-13 VITALS
HEIGHT: 70 IN | WEIGHT: 201 LBS | DIASTOLIC BLOOD PRESSURE: 80 MMHG | SYSTOLIC BLOOD PRESSURE: 150 MMHG | RESPIRATION RATE: 16 BRPM | TEMPERATURE: 97.1 F | BODY MASS INDEX: 28.77 KG/M2

## 2023-12-13 DIAGNOSIS — E03.9 ACQUIRED HYPOTHYROIDISM: ICD-10-CM

## 2023-12-13 DIAGNOSIS — G89.29 CHRONIC BILATERAL LOW BACK PAIN WITHOUT SCIATICA: ICD-10-CM

## 2023-12-13 DIAGNOSIS — K59.09 OTHER CONSTIPATION: ICD-10-CM

## 2023-12-13 DIAGNOSIS — M54.50 CHRONIC BILATERAL LOW BACK PAIN WITHOUT SCIATICA: ICD-10-CM

## 2023-12-13 DIAGNOSIS — E78.1 HYPERTRIGLYCERIDEMIA: ICD-10-CM

## 2023-12-13 DIAGNOSIS — M54.6 ACUTE BILATERAL THORACIC BACK PAIN: ICD-10-CM

## 2023-12-13 DIAGNOSIS — G44.209 TENSION HEADACHE: ICD-10-CM

## 2023-12-13 DIAGNOSIS — E11.9 TYPE 2 DIABETES MELLITUS WITHOUT COMPLICATION, WITHOUT LONG-TERM CURRENT USE OF INSULIN (HCC): Primary | ICD-10-CM

## 2023-12-13 DIAGNOSIS — I10 HTN, GOAL BELOW 140/90: ICD-10-CM

## 2023-12-13 LAB — SL AMB POCT HEMOGLOBIN AIC: 12.3 (ref ?–6.5)

## 2023-12-13 PROCEDURE — 99214 OFFICE O/P EST MOD 30 MIN: CPT | Performed by: FAMILY MEDICINE

## 2023-12-13 PROCEDURE — 72072 X-RAY EXAM THORAC SPINE 3VWS: CPT

## 2023-12-13 PROCEDURE — 96372 THER/PROPH/DIAG INJ SC/IM: CPT | Performed by: FAMILY MEDICINE

## 2023-12-13 PROCEDURE — 83036 HEMOGLOBIN GLYCOSYLATED A1C: CPT | Performed by: FAMILY MEDICINE

## 2023-12-13 RX ORDER — KETOROLAC TROMETHAMINE 30 MG/ML
60 INJECTION, SOLUTION INTRAMUSCULAR; INTRAVENOUS ONCE
Status: COMPLETED | OUTPATIENT
Start: 2023-12-13 | End: 2023-12-13

## 2023-12-13 RX ORDER — DOCUSATE SODIUM 100 MG/1
100 CAPSULE, LIQUID FILLED ORAL 2 TIMES DAILY
Qty: 60 CAPSULE | Refills: 2 | Status: SHIPPED | OUTPATIENT
Start: 2023-12-13

## 2023-12-13 RX ORDER — CYCLOBENZAPRINE HCL 10 MG
10 TABLET ORAL 3 TIMES DAILY PRN
Qty: 30 TABLET | Refills: 0 | Status: SHIPPED | OUTPATIENT
Start: 2023-12-13

## 2023-12-13 RX ORDER — TRAMADOL HYDROCHLORIDE 50 MG/1
50 TABLET ORAL DAILY PRN
Qty: 30 TABLET | Refills: 0 | Status: SHIPPED | OUTPATIENT
Start: 2023-12-13

## 2023-12-13 RX ORDER — NAPROXEN 500 MG/1
TABLET ORAL
Qty: 60 TABLET | Refills: 0 | Status: SHIPPED | OUTPATIENT
Start: 2023-12-13

## 2023-12-13 RX ADMIN — KETOROLAC TROMETHAMINE 60 MG: 30 INJECTION, SOLUTION INTRAMUSCULAR; INTRAVENOUS at 09:59

## 2023-12-13 NOTE — ASSESSMENT & PLAN NOTE
- Will obtain xray to rule out fracture or other pathology  - Continue NSAIDs, muscle relaxant and add tramadol for breakthrough pain.    - Will give toradol 60mg IM today   - PT referral given for further evaluation

## 2024-01-09 DIAGNOSIS — G44.209 TENSION HEADACHE: ICD-10-CM

## 2024-01-10 RX ORDER — NAPROXEN 500 MG/1
TABLET ORAL
Qty: 60 TABLET | Refills: 0 | Status: SHIPPED | OUTPATIENT
Start: 2024-01-10

## 2024-01-25 ENCOUNTER — TELEPHONE (OUTPATIENT)
Dept: FAMILY MEDICINE CLINIC | Facility: CLINIC | Age: 54
End: 2024-01-25

## 2024-01-25 NOTE — TELEPHONE ENCOUNTER
01/25/24    Hi Dr. Sellers,     Called Pt to [facility] 02/07/24 appt and offered your next available in April 2024.    Pt refused and requested to see you be for March because she stated that she is leave by the End of March to [facility].     Pt also stated that she haves a toe that is discolored and “I need to see Dr. Sellers”    I offered to see another PCP for the matter of her Toe, but Pt declined and reinstated again “NO NO NO! WANT TO SEE DR. SELLERS AND IM NOT SEEING ANOTHER DOCTOR”      Can you please Advise…    Thank You.

## 2024-01-26 NOTE — TELEPHONE ENCOUNTER
Hi,     We can leave my schedule for he 7th as is and I will round in the hospital in the morning and come see the rest of the patients listed there starting at 10am until 11:40am. Please close my schedule from 8am to 10am so no one else places patients into those slots.   Thanks

## 2024-02-05 ENCOUNTER — APPOINTMENT (OUTPATIENT)
Dept: LAB | Facility: HOSPITAL | Age: 54
End: 2024-02-05
Payer: COMMERCIAL

## 2024-02-05 DIAGNOSIS — I10 HTN, GOAL BELOW 140/90: Primary | ICD-10-CM

## 2024-02-05 LAB
ANION GAP SERPL CALCULATED.3IONS-SCNC: 13 MMOL/L
BUN SERPL-MCNC: 17 MG/DL (ref 5–25)
CALCIUM SERPL-MCNC: 9.5 MG/DL (ref 8.4–10.2)
CHLORIDE SERPL-SCNC: 91 MMOL/L (ref 96–108)
CHOLEST SERPL-MCNC: 418 MG/DL
CO2 SERPL-SCNC: 24 MMOL/L (ref 21–32)
CREAT SERPL-MCNC: 0.63 MG/DL (ref 0.6–1.3)
EST. AVERAGE GLUCOSE BLD GHB EST-MCNC: 301 MG/DL
GLUCOSE P FAST SERPL-MCNC: 312 MG/DL (ref 65–99)
HBA1C MFR BLD: 12.1 %
HDLC SERPL-MCNC: 44 MG/DL
NONHDLC SERPL-MCNC: 374 MG/DL
POTASSIUM SERPL-SCNC: 4 MMOL/L (ref 3.5–5.3)
SODIUM SERPL-SCNC: 128 MMOL/L (ref 135–147)
TRIGL SERPL-MCNC: 2679 MG/DL
TSH SERPL DL<=0.05 MIU/L-ACNC: 4.58 UIU/ML (ref 0.45–4.5)

## 2024-02-05 PROCEDURE — 84443 ASSAY THYROID STIM HORMONE: CPT

## 2024-02-05 PROCEDURE — 36415 COLL VENOUS BLD VENIPUNCTURE: CPT

## 2024-02-05 PROCEDURE — 83036 HEMOGLOBIN GLYCOSYLATED A1C: CPT

## 2024-02-05 PROCEDURE — 80061 LIPID PANEL: CPT

## 2024-02-05 PROCEDURE — 80048 BASIC METABOLIC PNL TOTAL CA: CPT

## 2024-02-07 ENCOUNTER — OFFICE VISIT (OUTPATIENT)
Dept: FAMILY MEDICINE CLINIC | Facility: CLINIC | Age: 54
End: 2024-02-07

## 2024-02-07 VITALS
HEIGHT: 70 IN | SYSTOLIC BLOOD PRESSURE: 160 MMHG | OXYGEN SATURATION: 96 % | DIASTOLIC BLOOD PRESSURE: 80 MMHG | TEMPERATURE: 98 F | HEART RATE: 106 BPM | BODY MASS INDEX: 29.06 KG/M2 | WEIGHT: 203 LBS | RESPIRATION RATE: 16 BRPM

## 2024-02-07 DIAGNOSIS — G89.29 CHRONIC BILATERAL LOW BACK PAIN WITHOUT SCIATICA: ICD-10-CM

## 2024-02-07 DIAGNOSIS — Z02.9 ENCOUNTER FOR NARCOTIC CONTRACT DISCUSSION: ICD-10-CM

## 2024-02-07 DIAGNOSIS — E78.1 HYPERTRIGLYCERIDEMIA: ICD-10-CM

## 2024-02-07 DIAGNOSIS — I10 HTN, GOAL BELOW 140/90: Primary | ICD-10-CM

## 2024-02-07 DIAGNOSIS — E03.9 ACQUIRED HYPOTHYROIDISM: ICD-10-CM

## 2024-02-07 DIAGNOSIS — M54.50 CHRONIC BILATERAL LOW BACK PAIN WITHOUT SCIATICA: ICD-10-CM

## 2024-02-07 DIAGNOSIS — E11.9 TYPE 2 DIABETES MELLITUS WITHOUT COMPLICATION, WITHOUT LONG-TERM CURRENT USE OF INSULIN (HCC): ICD-10-CM

## 2024-02-07 PROCEDURE — 99214 OFFICE O/P EST MOD 30 MIN: CPT | Performed by: FAMILY MEDICINE

## 2024-02-07 PROCEDURE — 80307 DRUG TEST PRSMV CHEM ANLYZR: CPT | Performed by: FAMILY MEDICINE

## 2024-02-07 RX ORDER — GLIPIZIDE 10 MG/1
10 TABLET, FILM COATED, EXTENDED RELEASE ORAL DAILY
Qty: 90 TABLET | Refills: 3 | Status: SHIPPED | OUTPATIENT
Start: 2024-02-07

## 2024-02-07 RX ORDER — ROSUVASTATIN CALCIUM 20 MG/1
20 TABLET, COATED ORAL DAILY
Qty: 90 TABLET | Refills: 2 | Status: SHIPPED | OUTPATIENT
Start: 2024-02-07

## 2024-02-07 RX ORDER — TRAMADOL HYDROCHLORIDE 50 MG/1
50 TABLET ORAL DAILY PRN
Qty: 30 TABLET | Refills: 0 | Status: SHIPPED | OUTPATIENT
Start: 2024-02-07

## 2024-02-07 RX ORDER — FENOFIBRATE 145 MG/1
145 TABLET, COATED ORAL DAILY
Qty: 90 TABLET | Refills: 3 | Status: SHIPPED | OUTPATIENT
Start: 2024-02-07

## 2024-02-07 RX ORDER — LEVOTHYROXINE SODIUM 0.15 MG/1
150 TABLET ORAL DAILY
Qty: 90 TABLET | Refills: 2 | Status: SHIPPED | OUTPATIENT
Start: 2024-02-07

## 2024-02-07 RX ORDER — METFORMIN HYDROCHLORIDE 750 MG/1
750 TABLET, EXTENDED RELEASE ORAL
Qty: 90 TABLET | Refills: 3 | Status: SHIPPED | OUTPATIENT
Start: 2024-02-07

## 2024-02-07 RX ORDER — ASPIRIN 81 MG/1
81 TABLET ORAL DAILY
Qty: 90 TABLET | Refills: 2 | Status: SHIPPED | OUTPATIENT
Start: 2024-02-07

## 2024-02-09 NOTE — PROGRESS NOTES
Name: Jo Matthew      : 1970      MRN: [id number]  Encounter Provider: Mahin Truong MD  Encounter Date: 2024   Encounter department: [facility]    Assessment & Plan     1. HTN, goal below 140/90  Assessment & Plan:  - BP not at goal  - Did not take her spironolactone in one week.   - Asymptomatic   - Refusing anymore medications at the moment  - Continue spironolactone 100mg BID    Orders:  -     Drug Screen Routine w /Conf and Adulteration, urine.; Future  -     Drug Screen Routine w /Conf and Adulteration, urine.    2. Type 2 diabetes mellitus without complication, without long-term current use of insulin (HCC)  Assessment & Plan:    Lab Results   Component Value Date    HGBA1C 12.1 (H) 2024       - HBA1C continues to worsen  - Reviewed medications and adherence . Continue metformin 750mg QD and increase glipizide to 10mg QD  - Discussed resuming lifestyle and dietary changes which helped significantly in the past.  - Needs ACE/ARB but has refused. Will discuss again on follow up visit.   - Not interested in any injectable medications.     Orders:  -     glipiZIDE (GLUCOTROL XL) 10 mg 24 hr tablet; Take 1 tablet (10 mg total) by mouth daily  -     metFORMIN (GLUCOPHAGE-XR) 750 mg 24 hr tablet; Take 1 tablet (750 mg total) by mouth daily with breakfast  -     aspirin (Aspirin Adult Low Strength) 81 mg EC tablet; Take 1 tablet (81 mg total) by mouth daily  -     Drug Screen Routine w /Conf and Adulteration, urine.; Future  -     Drug Screen Routine w /Conf and Adulteration, urine.    3. Acquired hypothyroidism  Assessment & Plan:  - TSH slightly above normal  - Advised to continue same dosage of thyroid medication and instructed to take on an empty stomach.        Orders:  -     levothyroxine 150 mcg tablet; Take 1 tablet (150 mcg total) by mouth daily  -     Drug Screen Routine w /Conf and Adulteration, urine.; Future  -     Drug Screen Routine w  /Conf and Adulteration, urine.    4. Hypertriglyceridemia  Assessment & Plan:  - Cholesterol and triglycerides increasing   - Advised to resume and take medications as prescribed  - Continue crestor and fenofibrate. Advised of increased risk of side effects.     Orders:  -     fenofibrate (TRICOR) 145 mg tablet; Take 1 tablet (145 mg total) by mouth daily  -     rosuvastatin (CRESTOR) 20 MG tablet; Take 1 tablet (20 mg total) by mouth daily  -     Drug Screen Routine w /Conf and Adulteration, urine.; Future  -     Drug Screen Routine w /Conf and Adulteration, urine.    5. Encounter for narcotic contract discussion  -     Drug Screen Routine w /Conf and Adulteration, urine.; Future  -     Drug Screen Routine w /Conf and Adulteration, urine.    6. Chronic bilateral low back pain without sciatica  Assessment & Plan:  - Continue Tramadol as needed.   - Drug test ordered and narcotic contract signed.     Orders:  -     traMADol (Ultram) 50 mg tablet; Take 1 tablet (50 mg total) by mouth daily as needed for moderate pain  -     Drug Screen Routine w /Conf and Adulteration, urine.; Future  -     Drug Screen Routine w /Conf and Adulteration, urine.           Subjective      Doing well but has not been taking her medications as prescribed due to going through personal issues. Does state that she has restarted her medications and exercising. No other complaints.     Back Pain  This is a chronic problem. The current episode started more than 1 month ago. The problem occurs constantly. The problem is unchanged. The pain is present in the thoracic spine. The quality of the pain is described as aching. The pain does not radiate. The pain is at a severity of 6/10. The pain is moderate. The pain is The same all the time. Pertinent negatives include no abdominal pain, chest pain, fever or weakness. Risk factors include lack of exercise and sedentary lifestyle. She has tried NSAIDs, muscle relaxant and heat for the symptoms. The  treatment provided mild relief.     Review of Systems   Constitutional:  Negative for fatigue and fever.   HENT:  Negative for congestion and sore throat.    Eyes:  Negative for visual disturbance.   Respiratory:  Negative for cough, shortness of breath and wheezing.    Cardiovascular:  Negative for chest pain, palpitations and leg swelling.   Gastrointestinal:  Negative for abdominal pain, anal bleeding, constipation, diarrhea, nausea and vomiting.   Endocrine: Negative for cold intolerance and heat intolerance.   Musculoskeletal:  Negative for arthralgias and joint swelling.   Skin:  Negative for color change.   Neurological:  Negative for dizziness, seizures, syncope, weakness and light-headedness.   Psychiatric/Behavioral:  Negative for agitation, confusion, sleep disturbance and suicidal ideas.        Current Outpatient Medications on File Prior to Visit   Medication Sig    acetaminophen (TYLENOL) 500 mg tablet Take 1 tablet (500 mg total) by mouth every 6 (six) hours as needed for mild pain    Blood Glucose Monitoring Suppl (ONETOUCH VERIO FLEX SYSTEM) w/Device KIT by Other route daily    cholecalciferol (VITAMIN D3) 1,000 units tablet Take 1,000 Units by mouth daily (Patient not taking: Reported on 11/19/2023)    cyclobenzaprine (FLEXERIL) 10 mg tablet Take 1 tablet (10 mg total) by mouth 3 (three) times a day as needed for muscle spasms    docusate sodium (COLACE) 100 mg capsule Take 1 capsule (100 mg total) by mouth 2 (two) times a day    estradiol (ESTRACE) 2 MG tablet TAKE 1 TABLET BY MOUTH 2 TIMES A DAY. (Patient not taking: Reported on 11/19/2023)    estradiol valerate (DELESTROGEN) 40 MG/ML injection Inject 0.5 mL (20 mg total) into a muscle every 14 (fourteen) days (Patient not taking: Reported on 11/19/2023)    gabapentin (Neurontin) 300 mg capsule Take 1 capsule (300 mg total) by mouth 3 (three) times a day as needed (back pain)    glucose blood (OneTouch Verio) test strip 1 each by Other route  "daily Use as instructed (Patient not taking: Reported on 11/19/2023)    naproxen (NAPROSYN) 500 mg tablet TAKE 1 TABLET BY MOUTH TWICE A DAY    nicotine (NICODERM CQ) 21 mg/24 hr TD 24 hr patch PLACE 1 PATCH ON THE SKIN OVER 24 HOURS EVERY 24 HOURS    nicotine polacrilex (NICORETTE) 4 mg gum CHEW 1 EACH (4 MG TOTAL) AS NEEDED FOR SMOKING CESSATION (Patient not taking: Reported on 11/19/2023)    OneTouch Delica Lancets 33G MISC by Does not apply route daily (Patient not taking: Reported on 11/19/2023)    spironolactone (ALDACTONE) 100 mg tablet Take 1 tablet (100 mg total) by mouth 2 (two) times a day    SYRINGE-NEEDLE, DISP, 3 ML (SAFETY-LUCIANO 3CC SYR 21GX1.5\") 21G X 1-1/2\" 3 ML MISC Use as directed (Patient not taking: Reported on 11/19/2023)    zolpidem (AMBIEN) 5 mg tablet TAKE 1 TABLET BY MOUTH DAILY AT BEDTIME AS NEEDED FOR SLEEP       Objective     /80 (BP Location: Left arm, Patient Position: Sitting, Cuff Size: Standard)   Pulse (!) 106   Temp 98 °F (36.7 °C) (Temporal)   Resp 16   Ht 5' 10\" (1.778 m)   Wt 92.1 kg (203 lb)   SpO2 96%   BMI 29.13 kg/m²     Physical Exam  Constitutional:       Appearance: She is well-developed.   HENT:      Head: Normocephalic and atraumatic.   Eyes:      Conjunctiva/sclera: Conjunctivae normal.      Pupils: Pupils are equal, round, and reactive to light.   Neck:      Vascular: No JVD.   Cardiovascular:      Rate and Rhythm: Normal rate and regular rhythm.      Heart sounds: Normal heart sounds. No murmur heard.     No friction rub. No gallop.   Pulmonary:      Effort: Pulmonary effort is normal. No respiratory distress.      Breath sounds: Normal breath sounds. No wheezing.   Abdominal:      General: Bowel sounds are normal. There is no distension.      Palpations: Abdomen is soft.      Tenderness: There is no abdominal tenderness.   Musculoskeletal:         General: Normal range of motion.      Cervical back: Normal range of motion and neck supple.   Skin:     " General: Skin is warm and dry.   Neurological:      Mental Status: She is alert and oriented to person, place, and time.      Deep Tendon Reflexes: Reflexes are normal and symmetric.   Psychiatric:         Behavior: Behavior normal.         Thought Content: Thought content normal.         Judgment: Judgment normal.       Mahin Truong MD

## 2024-02-09 NOTE — ASSESSMENT & PLAN NOTE
- TSH slightly above normal  - Advised to continue same dosage of thyroid medication and instructed to take on an empty stomach.

## 2024-02-09 NOTE — ASSESSMENT & PLAN NOTE
Lab Results   Component Value Date    HGBA1C 12.1 (H) 02/05/2024       - HBA1C continues to worsen  - Reviewed medications and adherence . Continue metformin 750mg QD and increase glipizide to 10mg QD  - Discussed resuming lifestyle and dietary changes which helped significantly in the past.  - Needs ACE/ARB but has refused. Will discuss again on follow up visit.   - Not interested in any injectable medications.

## 2024-02-09 NOTE — ASSESSMENT & PLAN NOTE
- Cholesterol and triglycerides increasing   - Advised to resume and take medications as prescribed  - Continue crestor and fenofibrate. Advised of increased risk of side effects.

## 2024-02-12 LAB
6MAM UR QL SCN: NEGATIVE NG/ML
ACCEPTABLE CREAT UR QL: 72 MG/DL
AMPHET UR QL SCN: NEGATIVE NG/ML
BARBITURATES UR QL SCN: NEGATIVE NG/ML
BENZODIAZ UR QL SCN: NEGATIVE NG/ML
BUPRENORPHINE UR QL CFM: NEGATIVE NG/ML
CANNABINOIDS UR QL SCN: NEGATIVE NG/ML
CARISOPRODOL UR QL: NEGATIVE NG/ML
COCAINE+BZE UR QL SCN: NEGATIVE NG/ML
ETHYL GLUCURONIDE UR QL SCN: NEGATIVE NG/ML
FENTANYL UR QL SCN: NEGATIVE NG/ML
GABAPENTIN SERPLBLD QL SCN: NEGATIVE UG/ML
METHADONE UR QL SCN: NEGATIVE NG/ML
NITRITE UR QL STRIP: NEGATIVE UG/ML
OPIATES UR QL SCN: NEGATIVE NG/ML
OXYCODONE+OXYMORPHONE UR QL SCN: NEGATIVE NG/ML
PCP UR QL SCN: NEGATIVE NG/ML
PROPOXYPH UR QL SCN: NEGATIVE NG/ML
SPECIMEN PH ACCEPTABLE UR: 5.8 (ref 4.5–8.9)
TAPENTADOL UR QL SCN: NEGATIVE NG/ML
TRAMADOL UR QL SCN: NEGATIVE NG/ML

## 2024-03-06 ENCOUNTER — OFFICE VISIT (OUTPATIENT)
Dept: FAMILY MEDICINE CLINIC | Facility: CLINIC | Age: 54
End: 2024-03-06

## 2024-03-06 VITALS
RESPIRATION RATE: 16 BRPM | OXYGEN SATURATION: 96 % | TEMPERATURE: 98 F | WEIGHT: 213 LBS | HEART RATE: 95 BPM | BODY MASS INDEX: 30.49 KG/M2 | SYSTOLIC BLOOD PRESSURE: 150 MMHG | DIASTOLIC BLOOD PRESSURE: 70 MMHG | HEIGHT: 70 IN

## 2024-03-06 DIAGNOSIS — E11.9 TYPE 2 DIABETES MELLITUS WITHOUT COMPLICATION, WITHOUT LONG-TERM CURRENT USE OF INSULIN (HCC): Primary | ICD-10-CM

## 2024-03-06 DIAGNOSIS — I10 HTN, GOAL BELOW 140/90: ICD-10-CM

## 2024-03-06 DIAGNOSIS — E03.9 ACQUIRED HYPOTHYROIDISM: ICD-10-CM

## 2024-03-06 PROBLEM — M54.6 ACUTE BILATERAL THORACIC BACK PAIN: Status: RESOLVED | Noted: 2023-12-13 | Resolved: 2024-03-06

## 2024-03-06 PROCEDURE — 99214 OFFICE O/P EST MOD 30 MIN: CPT | Performed by: FAMILY MEDICINE

## 2024-03-06 RX ORDER — LEVOTHYROXINE SODIUM 0.15 MG/1
150 TABLET ORAL DAILY
Qty: 90 TABLET | Refills: 2 | Status: SHIPPED | OUTPATIENT
Start: 2024-03-06

## 2024-03-06 RX ORDER — METFORMIN HYDROCHLORIDE 750 MG/1
750 TABLET, EXTENDED RELEASE ORAL
Qty: 90 TABLET | Refills: 3 | Status: SHIPPED | OUTPATIENT
Start: 2024-03-06

## 2024-03-06 NOTE — PROGRESS NOTES
Name: Jo Matthew      : 1970      MRN: [id number]  Encounter Provider: Mahin Truong MD  Encounter Date: 3/6/2024   Encounter department: [facility]    Assessment & Plan     1. Type 2 diabetes mellitus without complication, without long-term current use of insulin (HCC)  Assessment & Plan:  Lab Results   Component Value Date    HGBA1C 12.1 (H) 2024       - Reviewed medications and adherence . Continue metformin 750mg QD and increase glipizide to 10mg QD  - Congratulated on diet and lifestyle changes she has made.   - Needs ACE/ARB but has refused. Will discuss again on follow up visit.   - Not interested in any injectable medications.     Orders:  -     metFORMIN (GLUCOPHAGE-XR) 750 mg 24 hr tablet; Take 1 tablet (750 mg total) by mouth daily with breakfast    2. Acquired hypothyroidism  Assessment & Plan:  - TSH slightly above normal  - Advised to continue same dosage of thyroid medication and instructed to take on an empty stomach.        Orders:  -     levothyroxine 150 mcg tablet; Take 1 tablet (150 mcg total) by mouth daily    3. HTN, goal below 140/90  Assessment & Plan:  - BP not at goal  - Asymptomatic   - Refusing anymore medications at the moment  - Continue spironolactone 100mg BID             Subjective       This is a pleasant 53-year-old female with past medical history of type 2 diabetes, hypertension, hypertriglyceridemia and hypothyroidism who presents to the office today for follow-up of her diabetes.  Patient reports to be compliant with her medications except metformin which she did not receive on last visit.  Does state that she has been made dietary and lifestyle changes and has been exercising every day since her last visit.  She denies any complaints at the moment.      Review of Systems   Constitutional:  Negative for fatigue.   HENT:  Negative for congestion and sore throat.    Eyes:  Negative for visual disturbance.    Respiratory:  Negative for cough, shortness of breath and wheezing.    Cardiovascular:  Negative for palpitations and leg swelling.   Gastrointestinal:  Negative for anal bleeding, constipation, diarrhea, nausea and vomiting.   Endocrine: Negative for cold intolerance and heat intolerance.   Skin:  Negative for color change.   Psychiatric/Behavioral:  Negative for agitation, confusion, sleep disturbance and suicidal ideas.        Current Outpatient Medications on File Prior to Visit   Medication Sig    acetaminophen (TYLENOL) 500 mg tablet Take 1 tablet (500 mg total) by mouth every 6 (six) hours as needed for mild pain    aspirin (Aspirin Adult Low Strength) 81 mg EC tablet Take 1 tablet (81 mg total) by mouth daily    Blood Glucose Monitoring Suppl (ONETOUCH VERIO FLEX SYSTEM) w/Device KIT by Other route daily    cholecalciferol (VITAMIN D3) 1,000 units tablet Take 1,000 Units by mouth daily (Patient not taking: Reported on 11/19/2023)    cyclobenzaprine (FLEXERIL) 10 mg tablet Take 1 tablet (10 mg total) by mouth 3 (three) times a day as needed for muscle spasms    docusate sodium (COLACE) 100 mg capsule Take 1 capsule (100 mg total) by mouth 2 (two) times a day    estradiol (ESTRACE) 2 MG tablet TAKE 1 TABLET BY MOUTH 2 TIMES A DAY. (Patient not taking: Reported on 11/19/2023)    estradiol valerate (DELESTROGEN) 40 MG/ML injection Inject 0.5 mL (20 mg total) into a muscle every 14 (fourteen) days (Patient not taking: Reported on 11/19/2023)    fenofibrate (TRICOR) 145 mg tablet Take 1 tablet (145 mg total) by mouth daily    gabapentin (Neurontin) 300 mg capsule Take 1 capsule (300 mg total) by mouth 3 (three) times a day as needed (back pain)    glipiZIDE (GLUCOTROL XL) 10 mg 24 hr tablet Take 1 tablet (10 mg total) by mouth daily    glucose blood (OneTouch Verio) test strip 1 each by Other route daily Use as instructed (Patient not taking: Reported on 11/19/2023)    naproxen (NAPROSYN) 500 mg tablet TAKE 1  "TABLET BY MOUTH TWICE A DAY    nicotine (NICODERM CQ) 21 mg/24 hr TD 24 hr patch PLACE 1 PATCH ON THE SKIN OVER 24 HOURS EVERY 24 HOURS    nicotine polacrilex (NICORETTE) 4 mg gum CHEW 1 EACH (4 MG TOTAL) AS NEEDED FOR SMOKING CESSATION (Patient not taking: Reported on 11/19/2023)    OneTouch Delica Lancets 33G MISC by Does not apply route daily (Patient not taking: Reported on 11/19/2023)    rosuvastatin (CRESTOR) 20 MG tablet Take 1 tablet (20 mg total) by mouth daily    spironolactone (ALDACTONE) 100 mg tablet Take 1 tablet (100 mg total) by mouth 2 (two) times a day    SYRINGE-NEEDLE, DISP, 3 ML (SAFETY-LUCIANO 3CC SYR 21GX1.5\") 21G X 1-1/2\" 3 ML MISC Use as directed (Patient not taking: Reported on 11/19/2023)    traMADol (Ultram) 50 mg tablet Take 1 tablet (50 mg total) by mouth daily as needed for moderate pain    zolpidem (AMBIEN) 5 mg tablet TAKE 1 TABLET BY MOUTH DAILY AT BEDTIME AS NEEDED FOR SLEEP    [DISCONTINUED] levothyroxine 150 mcg tablet Take 1 tablet (150 mcg total) by mouth daily    [DISCONTINUED] metFORMIN (GLUCOPHAGE-XR) 750 mg 24 hr tablet Take 1 tablet (750 mg total) by mouth daily with breakfast       Objective     /70 (BP Location: Right arm, Patient Position: Sitting, Cuff Size: Standard)   Pulse 95   Temp 98 °F (36.7 °C) (Temporal)   Resp 16   Ht 5' 10\" (1.778 m)   Wt 96.6 kg (213 lb)   SpO2 96%   BMI 30.56 kg/m²     Physical Exam  Constitutional:       Appearance: She is well-developed.   HENT:      Head: Normocephalic and atraumatic.   Eyes:      Conjunctiva/sclera: Conjunctivae normal.      Pupils: Pupils are equal, round, and reactive to light.   Neck:      Vascular: No JVD.   Cardiovascular:      Rate and Rhythm: Normal rate and regular rhythm.      Heart sounds: Normal heart sounds. No murmur heard.     No friction rub. No gallop.   Pulmonary:      Effort: Pulmonary effort is normal. No respiratory distress.      Breath sounds: Normal breath sounds. No wheezing. "   Abdominal:      General: Bowel sounds are normal. There is no distension.      Palpations: Abdomen is soft.      Tenderness: There is no abdominal tenderness.   Musculoskeletal:         General: Normal range of motion.      Cervical back: Normal range of motion and neck supple.   Skin:     General: Skin is warm and dry.   Neurological:      Mental Status: She is alert and oriented to person, place, and time.      Deep Tendon Reflexes: Reflexes are normal and symmetric.   Psychiatric:         Behavior: Behavior normal.         Thought Content: Thought content normal.         Judgment: Judgment normal.       Mahin Truong MD

## 2024-03-06 NOTE — ASSESSMENT & PLAN NOTE
Lab Results   Component Value Date    HGBA1C 12.1 (H) 02/05/2024       - Reviewed medications and adherence . Continue metformin 750mg QD and increase glipizide to 10mg QD  - Congratulated on diet and lifestyle changes she has made.   - Needs ACE/ARB but has refused. Will discuss again on follow up visit.   - Not interested in any injectable medications.

## 2024-03-06 NOTE — ASSESSMENT & PLAN NOTE
- BP not at goal  - Asymptomatic   - Refusing anymore medications at the moment  - Continue spironolactone 100mg BID

## 2024-03-25 ENCOUNTER — DOCUMENTATION (OUTPATIENT)
Dept: FAMILY MEDICINE CLINIC | Facility: CLINIC | Age: 54
End: 2024-03-25

## 2024-03-25 DIAGNOSIS — E03.9 ACQUIRED HYPOTHYROIDISM: Primary | ICD-10-CM

## 2024-03-25 RX ORDER — LEVOTHYROXINE SODIUM 0.15 MG/1
150 TABLET ORAL DAILY
Qty: 90 TABLET | Refills: 10 | Status: SHIPPED | OUTPATIENT
Start: 2024-03-25

## 2024-04-09 ENCOUNTER — TELEPHONE (OUTPATIENT)
Dept: FAMILY MEDICINE CLINIC | Facility: CLINIC | Age: 54
End: 2024-04-09

## 2024-04-09 NOTE — TELEPHONE ENCOUNTER
first attempt to contact patient. left message to return my call on answering machine    If pt returns phone call, please assist with rescheduling appt from 4/18 to 4/19

## 2024-04-18 NOTE — PROGRESS NOTES
"[facility]- OBGYN   Name: Jo Matthew  : 1970    patient evaluated by Dr. Killian  Subjective:     Jo Matthew is a 53 y.o. transgender female who presents for follow up after starting gender affirming hormone therapy. Her for her annual well women exam.          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    Hypertriglyceridemia    Type 2 diabetes mellitus without complication, without long-term current use of insulin (HCC)    Tobacco abuse    HTN, goal below 140/90    Primary insomnia    Chronic bilateral low back pain without sciatica    Onychomycosis of left great toe    Other constipation     Past Medical History:   Diagnosis Date    Chronic pain of both knees 2019    Diabetes mellitus (HCC)     Seizure disorder (HCC)     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       Objective:    Vitals: VSS, BMI 30.6    Physical Exam:    Breast: without masses, implants in tact    Abdomen: Soft positive bowel sounds    Pelvic: Normal external female genitalia post surgery: Urethra stable    Vag: Appropriate depth, small amount of   physiologic discharge;    Rectal: Prostate not palpable      Assessment/Plan:    Problem List Items Addressed This Visit       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  Patient was aware of " risks of estrogen therapy as well as possible effect on triglycerides. Will reduce estrogen dose   Patient is aware of these risks and wishes to continue medications  But will also repeat mammogram.  Lab work being ordered by primary care physician.      JAVIER Campos   4/19/2024  10:24 AM

## 2024-04-19 ENCOUNTER — OFFICE VISIT (OUTPATIENT)
Dept: OBGYN CLINIC | Facility: CLINIC | Age: 54
End: 2024-04-19

## 2024-04-19 ENCOUNTER — OFFICE VISIT (OUTPATIENT)
Dept: FAMILY MEDICINE CLINIC | Facility: CLINIC | Age: 54
End: 2024-04-19

## 2024-04-19 VITALS
HEART RATE: 105 BPM | DIASTOLIC BLOOD PRESSURE: 73 MMHG | WEIGHT: 213.2 LBS | BODY MASS INDEX: 30.52 KG/M2 | HEIGHT: 70 IN | SYSTOLIC BLOOD PRESSURE: 155 MMHG

## 2024-04-19 VITALS
DIASTOLIC BLOOD PRESSURE: 81 MMHG | WEIGHT: 213 LBS | OXYGEN SATURATION: 96 % | HEART RATE: 98 BPM | BODY MASS INDEX: 30.49 KG/M2 | RESPIRATION RATE: 16 BRPM | TEMPERATURE: 98 F | SYSTOLIC BLOOD PRESSURE: 133 MMHG | HEIGHT: 70 IN

## 2024-04-19 DIAGNOSIS — Z72.0 TOBACCO ABUSE: ICD-10-CM

## 2024-04-19 DIAGNOSIS — B35.1 ONYCHOMYCOSIS OF LEFT GREAT TOE: ICD-10-CM

## 2024-04-19 DIAGNOSIS — I10 HTN, GOAL BELOW 140/90: Primary | ICD-10-CM

## 2024-04-19 DIAGNOSIS — S09.90XA TRAUMATIC INJURY OF HEAD, INITIAL ENCOUNTER: ICD-10-CM

## 2024-04-19 DIAGNOSIS — Z78.9 MALE-TO-FEMALE TRANSGENDER PERSON: Primary | ICD-10-CM

## 2024-04-19 DIAGNOSIS — G89.29 CHRONIC BILATERAL LOW BACK PAIN WITHOUT SCIATICA: ICD-10-CM

## 2024-04-19 DIAGNOSIS — M54.50 CHRONIC BILATERAL LOW BACK PAIN WITHOUT SCIATICA: ICD-10-CM

## 2024-04-19 DIAGNOSIS — Z78.9 MALE-TO-FEMALE TRANSGENDER PERSON: ICD-10-CM

## 2024-04-19 DIAGNOSIS — E11.9 TYPE 2 DIABETES MELLITUS WITHOUT COMPLICATION, WITHOUT LONG-TERM CURRENT USE OF INSULIN (HCC): ICD-10-CM

## 2024-04-19 DIAGNOSIS — Z78.9 TRANSGENDER: ICD-10-CM

## 2024-04-19 DIAGNOSIS — E78.1 HYPERTRIGLYCERIDEMIA: ICD-10-CM

## 2024-04-19 DIAGNOSIS — E03.9 ACQUIRED HYPOTHYROIDISM: ICD-10-CM

## 2024-04-19 PROCEDURE — 99213 OFFICE O/P EST LOW 20 MIN: CPT | Performed by: OBSTETRICS & GYNECOLOGY

## 2024-04-19 PROCEDURE — 99214 OFFICE O/P EST MOD 30 MIN: CPT | Performed by: FAMILY MEDICINE

## 2024-04-19 RX ORDER — ROSUVASTATIN CALCIUM 20 MG/1
20 TABLET, COATED ORAL DAILY
Qty: 90 TABLET | Refills: 2 | Status: SHIPPED | OUTPATIENT
Start: 2024-04-19

## 2024-04-19 RX ORDER — METFORMIN HYDROCHLORIDE 750 MG/1
750 TABLET, EXTENDED RELEASE ORAL
Qty: 90 TABLET | Refills: 3 | Status: SHIPPED | OUTPATIENT
Start: 2024-04-19

## 2024-04-19 RX ORDER — LEVOTHYROXINE SODIUM 0.15 MG/1
150 TABLET ORAL DAILY
Qty: 90 TABLET | Refills: 10 | Status: SHIPPED | OUTPATIENT
Start: 2024-04-19

## 2024-04-19 RX ORDER — ESTRADIOL VALERATE 40 MG/ML
10 INJECTION INTRAMUSCULAR
Qty: 15 ML | Refills: 2 | Status: SHIPPED | OUTPATIENT
Start: 2024-04-19 | End: 2024-04-19

## 2024-04-19 RX ORDER — SPIRONOLACTONE 100 MG/1
100 TABLET, FILM COATED ORAL 2 TIMES DAILY
Qty: 180 TABLET | Refills: 3 | Status: SHIPPED | OUTPATIENT
Start: 2024-04-19 | End: 2024-04-19 | Stop reason: SDUPTHER

## 2024-04-19 RX ORDER — NICOTINE 21 MG/24HR
1 PATCH, TRANSDERMAL 24 HOURS TRANSDERMAL EVERY 24 HOURS
Qty: 28 PATCH | Refills: 3 | Status: SHIPPED | OUTPATIENT
Start: 2024-04-19

## 2024-04-19 RX ORDER — ACETAMINOPHEN 500 MG
500 TABLET ORAL EVERY 6 HOURS PRN
Qty: 30 TABLET | Refills: 0 | Status: SHIPPED | OUTPATIENT
Start: 2024-04-19

## 2024-04-19 RX ORDER — SPIRONOLACTONE 100 MG/1
100 TABLET, FILM COATED ORAL 2 TIMES DAILY
Qty: 180 TABLET | Refills: 3 | Status: SHIPPED | OUTPATIENT
Start: 2024-04-19

## 2024-04-19 RX ORDER — ASPIRIN 81 MG/1
81 TABLET ORAL DAILY
Qty: 90 TABLET | Refills: 2 | Status: SHIPPED | OUTPATIENT
Start: 2024-04-19

## 2024-04-19 RX ORDER — FENOFIBRATE 145 MG/1
145 TABLET, COATED ORAL DAILY
Qty: 90 TABLET | Refills: 3 | Status: SHIPPED | OUTPATIENT
Start: 2024-04-19

## 2024-04-19 RX ORDER — ESTRADIOL VALERATE 40 MG/ML
10 INJECTION INTRAMUSCULAR
Qty: 15 ML | Refills: 2 | Status: SHIPPED | OUTPATIENT
Start: 2024-04-19

## 2024-04-19 RX ORDER — GLIPIZIDE 10 MG/1
10 TABLET, FILM COATED, EXTENDED RELEASE ORAL DAILY
Qty: 90 TABLET | Refills: 3 | Status: SHIPPED | OUTPATIENT
Start: 2024-04-19

## 2024-04-19 RX ORDER — TRAMADOL HYDROCHLORIDE 50 MG/1
50 TABLET ORAL DAILY PRN
Qty: 30 TABLET | Refills: 0 | Status: SHIPPED | OUTPATIENT
Start: 2024-04-19

## 2024-04-19 NOTE — ASSESSMENT & PLAN NOTE
Lab Results   Component Value Date    HGBA1C 12.1 (H) 02/05/2024       - Reviewed medications and adherence . Continue metformin 750mg QD and increase glipizide to 10mg QD  - Congratulated on diet and lifestyle changes she has made.   - Needs ACE/ARB but has refused.   - Not interested in any injectable medications.

## 2024-04-19 NOTE — PROGRESS NOTES
Name: Jo Matthew      : 1970      MRN: [id number]  Encounter Provider: Mahin Truong MD  Encounter Date: 2024   Encounter department: [facility]    Assessment & Plan     1. HTN, goal below 140/90  Assessment & Plan:  - BP at goal  - Continue spironolactone 100mg BID    Orders:  -     Comprehensive metabolic panel; Future    2. Acquired hypothyroidism  Assessment & Plan:  - TSH slightly above normal  - Advised to continue same dosage of thyroid medication and instructed to take on an empty stomach.        Orders:  -     TSH, 3rd generation; Future  -     levothyroxine 150 mcg tablet; Take 1 tablet (150 mcg total) by mouth daily    3. Type 2 diabetes mellitus without complication, without long-term current use of insulin (HCC)  Assessment & Plan:      Lab Results   Component Value Date    HGBA1C 12.1 (H) 2024       - Reviewed medications and adherence . Continue metformin 750mg QD and increase glipizide to 10mg QD  - Congratulated on diet and lifestyle changes she has made.   - Needs ACE/ARB but has refused.   - Not interested in any injectable medications.     Orders:  -     Hemoglobin A1C; Future  -     metFORMIN (GLUCOPHAGE-XR) 750 mg 24 hr tablet; Take 1 tablet (750 mg total) by mouth daily with breakfast  -     glipiZIDE (GLUCOTROL XL) 10 mg 24 hr tablet; Take 1 tablet (10 mg total) by mouth daily  -     aspirin (Aspirin Adult Low Strength) 81 mg EC tablet; Take 1 tablet (81 mg total) by mouth daily    4. Hypertriglyceridemia  Assessment & Plan:  - Continue crestor and fenofibrate. Advised of increased risk of side effects.     Orders:  -     Lipid panel; Future  -     rosuvastatin (CRESTOR) 20 MG tablet; Take 1 tablet (20 mg total) by mouth daily  -     fenofibrate (TRICOR) 145 mg tablet; Take 1 tablet (145 mg total) by mouth daily    5. Chronic bilateral low back pain without sciatica  Assessment & Plan:  - Continue Tramadol as needed.   -  Drug test ordered and narcotic contract in place.     Orders:  -     traMADol (Ultram) 50 mg tablet; Take 1 tablet (50 mg total) by mouth daily as needed for moderate pain    6. Traumatic injury of head, initial encounter  -     acetaminophen (TYLENOL) 500 mg tablet; Take 1 tablet (500 mg total) by mouth every 6 (six) hours as needed for mild pain    7. 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 Tobacco abuse  Assessment & Plan:  Counseled patient on the importance of smoking cessation.  Counseling time was over 3 minutes.  Patient acknowledges an understanding of the risks of smoking.   At the very least, patient is agreeable to try cutting down.  Discussed outside services to help with quitting smoking.  Discussed the specifics of using nicotine patches, on a tapering schedule,       after stopping smoking. Tapering can last 6 months.  While on the nicotine patches, I instructed on the use of PRN nicotine gum,       instead of reaching for a cigarette, in a moment of weakness.    Orders:  -     nicotine (NICODERM CQ) 21 mg/24 hr TD 24 hr patch; Place 1 patch on the skin over 24 hours every 24 hours  -     nicotine polacrilex (NICORETTE) 4 mg gum; Chew 1 each (4 mg total) as needed for smoking cessation    9. Onychomycosis of left great toe  Assessment & Plan:  - Will refer to podiatry for further evaluation.    Orders:  -     Ambulatory Referral to Podiatry; Future           Subjective       This is a pleasant 53-year-old female with past medical history of type 2 diabetes, hypertension, hypertriglyceridemia and hypothyroidism who presents to the office today for follow-up of her diabetes.  Patient reports to be compliant with all her medications.  Does state that she has been made dietary and lifestyle changes and has been exercising every day since her last visit.  She denies any complaints at the  "moment.      Review of Systems   Constitutional:  Negative for appetite change, chills and fatigue.   HENT:  Negative for congestion, ear pain, hearing loss, sore throat and trouble swallowing.    Respiratory:  Negative for cough, chest tightness and shortness of breath.    Cardiovascular:  Negative for chest pain, palpitations and leg swelling.   Gastrointestinal:  Negative for abdominal pain, anal bleeding, nausea and vomiting.   Endocrine: Negative for cold intolerance, heat intolerance, polydipsia, polyphagia and polyuria.   Genitourinary:  Negative for difficulty urinating, pelvic pain, vaginal bleeding and vaginal discharge.   Musculoskeletal:  Negative for back pain, joint swelling and neck stiffness.   Neurological:  Negative for dizziness, syncope, numbness and headaches.       Current Outpatient Medications on File Prior to Visit   Medication Sig    Blood Glucose Monitoring Suppl (ONETOUCH VERIO FLEX SYSTEM) w/Device KIT by Other route daily    estradiol (ESTRACE) 2 MG tablet TAKE 1 TABLET BY MOUTH 2 TIMES A DAY. (Patient not taking: Reported on 11/19/2023)    estradiol valerate (DELESTROGEN) 40 MG/ML injection Inject 0.25 mL (10 mg total) into a muscle every 14 (fourteen) days    gabapentin (Neurontin) 300 mg capsule Take 1 capsule (300 mg total) by mouth 3 (three) times a day as needed (back pain)    glucose blood (OneTouch Verio) test strip 1 each by Other route daily Use as instructed (Patient not taking: Reported on 11/19/2023)    naproxen (NAPROSYN) 500 mg tablet TAKE 1 TABLET BY MOUTH TWICE A DAY    OneTouch Delica Lancets 33G MISC by Does not apply route daily (Patient not taking: Reported on 11/19/2023)    SYRINGE-NEEDLE, DISP, 3 ML (SAFETY-LUCIANO 3CC SYR 21GX1.5\") 21G X 1-1/2\" 3 ML MISC Use as directed    zolpidem (AMBIEN) 5 mg tablet TAKE 1 TABLET BY MOUTH DAILY AT BEDTIME AS NEEDED FOR SLEEP    [DISCONTINUED] acetaminophen (TYLENOL) 500 mg tablet Take 1 tablet (500 mg total) by mouth every 6 (six) " hours as needed for mild pain    [DISCONTINUED] aspirin (Aspirin Adult Low Strength) 81 mg EC tablet Take 1 tablet (81 mg total) by mouth daily    [DISCONTINUED] cholecalciferol (VITAMIN D3) 1,000 units tablet Take 1,000 Units by mouth daily (Patient not taking: Reported on 11/19/2023)    [DISCONTINUED] cyclobenzaprine (FLEXERIL) 10 mg tablet Take 1 tablet (10 mg total) by mouth 3 (three) times a day as needed for muscle spasms    [DISCONTINUED] docusate sodium (COLACE) 100 mg capsule Take 1 capsule (100 mg total) by mouth 2 (two) times a day    [DISCONTINUED] estradiol valerate (DELESTROGEN) 40 MG/ML injection Inject 0.5 mL (20 mg total) into a muscle every 14 (fourteen) days (Patient not taking: Reported on 11/19/2023)    [DISCONTINUED] estradiol valerate (DELESTROGEN) 40 MG/ML injection Inject 0.25 mL (10 mg total) into a muscle every 14 (fourteen) days    [DISCONTINUED] fenofibrate (TRICOR) 145 mg tablet Take 1 tablet (145 mg total) by mouth daily    [DISCONTINUED] glipiZIDE (GLUCOTROL XL) 10 mg 24 hr tablet Take 1 tablet (10 mg total) by mouth daily    [DISCONTINUED] levothyroxine 150 mcg tablet Take 1 tablet (150 mcg total) by mouth daily    [DISCONTINUED] metFORMIN (GLUCOPHAGE-XR) 750 mg 24 hr tablet Take 1 tablet (750 mg total) by mouth daily with breakfast    [DISCONTINUED] nicotine (NICODERM CQ) 21 mg/24 hr TD 24 hr patch PLACE 1 PATCH ON THE SKIN OVER 24 HOURS EVERY 24 HOURS    [DISCONTINUED] nicotine polacrilex (NICORETTE) 4 mg gum CHEW 1 EACH (4 MG TOTAL) AS NEEDED FOR SMOKING CESSATION (Patient not taking: Reported on 11/19/2023)    [DISCONTINUED] rosuvastatin (CRESTOR) 20 MG tablet Take 1 tablet (20 mg total) by mouth daily    [DISCONTINUED] spironolactone (ALDACTONE) 100 mg tablet Take 1 tablet (100 mg total) by mouth 2 (two) times a day    [DISCONTINUED] spironolactone (ALDACTONE) 100 mg tablet Take 1 tablet (100 mg total) by mouth 2 (two) times a day    [DISCONTINUED] SYRINGE-NEEDLE, DISP, 3 ML  "(SAFETY-LUCIANO 3CC SYR 21GX1.5\") 21G X 1-1/2\" 3 ML MISC Use as directed (Patient not taking: Reported on 11/19/2023)    [DISCONTINUED] SYRINGE-NEEDLE, DISP, 3 ML (SAFETY-LUCIANO 3CC SYR 21GX1.5\") 21G X 1-1/2\" 3 ML MISC Use as directed    [DISCONTINUED] traMADol (Ultram) 50 mg tablet Take 1 tablet (50 mg total) by mouth daily as needed for moderate pain       Objective     /81 (BP Location: Right arm, Patient Position: Sitting, Cuff Size: Large)   Pulse 98   Temp 98 °F (36.7 °C) (Temporal)   Resp 16   Ht 5' 10\" (1.778 m)   Wt 96.6 kg (213 lb)   SpO2 96%   BMI 30.56 kg/m²     Physical Exam  Constitutional:       Appearance: She is well-developed.   HENT:      Head: Normocephalic and atraumatic.   Eyes:      Conjunctiva/sclera: Conjunctivae normal.      Pupils: Pupils are equal, round, and reactive to light.   Neck:      Vascular: No JVD.   Cardiovascular:      Rate and Rhythm: Normal rate and regular rhythm.      Heart sounds: Normal heart sounds. No murmur heard.     No friction rub. No gallop.   Pulmonary:      Effort: Pulmonary effort is normal. No respiratory distress.      Breath sounds: Normal breath sounds. No wheezing.   Abdominal:      General: Bowel sounds are normal. There is no distension.      Palpations: Abdomen is soft.      Tenderness: There is no abdominal tenderness.   Musculoskeletal:         General: Normal range of motion.      Cervical back: Normal range of motion and neck supple.   Skin:     General: Skin is warm and dry.   Neurological:      Mental Status: She is alert and oriented to person, place, and time.      Deep Tendon Reflexes: Reflexes are normal and symmetric.   Psychiatric:         Behavior: Behavior normal.         Thought Content: Thought content normal.         Judgment: Judgment normal.       Mahin Truong MD    "

## 2024-05-16 ENCOUNTER — HOSPITAL ENCOUNTER (OUTPATIENT)
Dept: RADIOLOGY | Facility: IMAGING CENTER | Age: 54
End: 2024-05-16
Payer: COMMERCIAL

## 2024-05-16 VITALS — WEIGHT: 213 LBS | HEIGHT: 70 IN | BODY MASS INDEX: 30.49 KG/M2

## 2024-05-16 DIAGNOSIS — Z78.9 MALE-TO-FEMALE TRANSGENDER PERSON: ICD-10-CM

## 2024-05-16 PROCEDURE — 77067 SCR MAMMO BI INCL CAD: CPT

## 2024-05-16 PROCEDURE — 77063 BREAST TOMOSYNTHESIS BI: CPT

## 2024-05-24 DIAGNOSIS — R92.8 ABNORMAL MAMMOGRAM: Primary | ICD-10-CM

## 2024-05-24 NOTE — PROGRESS NOTES
Patient is a 53-year-old transgender female who underwent a screening mammography.  2 small areas on the left breast were identified and patient was requested to have a diagnostic image on the left breast.  But due to the compression that is required patient is unable to tolerate the diagnostic procedure.  My question is whether an MRI is needed.     Assessment: Abnormal mammogram    Plan: Consultation with Dr. Aponte breast surgeon

## 2024-06-11 ENCOUNTER — CONSULT (OUTPATIENT)
Dept: SURGICAL ONCOLOGY | Facility: CLINIC | Age: 54
End: 2024-06-11
Payer: COMMERCIAL

## 2024-06-11 VITALS
HEIGHT: 70 IN | BODY MASS INDEX: 30.21 KG/M2 | WEIGHT: 211 LBS | TEMPERATURE: 99.3 F | HEART RATE: 81 BPM | DIASTOLIC BLOOD PRESSURE: 84 MMHG | SYSTOLIC BLOOD PRESSURE: 144 MMHG

## 2024-06-11 DIAGNOSIS — N63.14 MASS OF LOWER INNER QUADRANT OF RIGHT BREAST: Primary | ICD-10-CM

## 2024-06-11 DIAGNOSIS — Z91.89 INCREASED RISK OF BREAST CANCER: ICD-10-CM

## 2024-06-11 DIAGNOSIS — R92.8 ABNORMAL MAMMOGRAM: ICD-10-CM

## 2024-06-11 DIAGNOSIS — N63.25 MASS OVERLAPPING MULTIPLE QUADRANTS OF LEFT BREAST: ICD-10-CM

## 2024-06-11 DIAGNOSIS — Z98.82: ICD-10-CM

## 2024-06-11 PROCEDURE — 99204 OFFICE O/P NEW MOD 45 MIN: CPT

## 2024-06-11 RX ORDER — ALPRAZOLAM 1 MG/1
TABLET ORAL
Qty: 2 TABLET | Refills: 0 | Status: SHIPPED | OUTPATIENT
Start: 2024-06-11
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
## 2024-06-17 DIAGNOSIS — Z00.6 ENCOUNTER FOR EXAMINATION FOR NORMAL COMPARISON OR CONTROL IN CLINICAL RESEARCH PROGRAM: ICD-10-CM

## 2024-06-18 ENCOUNTER — APPOINTMENT (OUTPATIENT)
Dept: LAB | Facility: HOSPITAL | Age: 54
End: 2024-06-18
Payer: COMMERCIAL

## 2024-06-18 DIAGNOSIS — E11.9 TYPE 2 DIABETES MELLITUS WITHOUT COMPLICATION, WITHOUT LONG-TERM CURRENT USE OF INSULIN (HCC): ICD-10-CM

## 2024-06-18 DIAGNOSIS — Z00.6 ENCOUNTER FOR EXAMINATION FOR NORMAL COMPARISON OR CONTROL IN CLINICAL RESEARCH PROGRAM: ICD-10-CM

## 2024-06-18 DIAGNOSIS — E03.9 ACQUIRED HYPOTHYROIDISM: ICD-10-CM

## 2024-06-18 DIAGNOSIS — E78.1 HYPERTRIGLYCERIDEMIA: ICD-10-CM

## 2024-06-18 DIAGNOSIS — I10 HTN, GOAL BELOW 140/90: ICD-10-CM

## 2024-06-18 LAB
ALBUMIN SERPL BCP-MCNC: 4.1 G/DL (ref 3.5–5)
ALP SERPL-CCNC: 56 U/L (ref 34–104)
ALT SERPL W P-5'-P-CCNC: 13 U/L (ref 7–52)
ANION GAP SERPL CALCULATED.3IONS-SCNC: 11 MMOL/L (ref 4–13)
AST SERPL W P-5'-P-CCNC: 7 U/L (ref 5–45)
BILIRUB SERPL-MCNC: 0.36 MG/DL (ref 0.2–1)
BUN SERPL-MCNC: 18 MG/DL (ref 5–25)
CALCIUM SERPL-MCNC: 9.4 MG/DL (ref 8.4–10.2)
CHLORIDE SERPL-SCNC: 97 MMOL/L (ref 96–108)
CHOLEST SERPL-MCNC: 251 MG/DL
CO2 SERPL-SCNC: 26 MMOL/L (ref 21–32)
CREAT SERPL-MCNC: 0.71 MG/DL (ref 0.6–1.3)
EST. AVERAGE GLUCOSE BLD GHB EST-MCNC: 280 MG/DL
GLUCOSE P FAST SERPL-MCNC: 314 MG/DL (ref 65–99)
HBA1C MFR BLD: 11.4 %
HDLC SERPL-MCNC: 47 MG/DL
NONHDLC SERPL-MCNC: 204 MG/DL
POTASSIUM SERPL-SCNC: 3.9 MMOL/L (ref 3.5–5.3)
PROT SERPL-MCNC: 6.8 G/DL (ref 6.4–8.4)
SODIUM SERPL-SCNC: 134 MMOL/L (ref 135–147)
TRIGL SERPL-MCNC: 1091 MG/DL
TSH SERPL DL<=0.05 MIU/L-ACNC: 6.44 UIU/ML (ref 0.45–4.5)

## 2024-06-18 PROCEDURE — 83036 HEMOGLOBIN GLYCOSYLATED A1C: CPT

## 2024-06-18 PROCEDURE — 84443 ASSAY THYROID STIM HORMONE: CPT

## 2024-06-18 PROCEDURE — 80061 LIPID PANEL: CPT

## 2024-06-18 PROCEDURE — 80053 COMPREHEN METABOLIC PANEL: CPT

## 2024-06-18 PROCEDURE — 36415 COLL VENOUS BLD VENIPUNCTURE: CPT

## 2024-07-02 ENCOUNTER — HOSPITAL ENCOUNTER (OUTPATIENT)
Dept: MAMMOGRAPHY | Facility: CLINIC | Age: 54
Discharge: HOME | End: 2024-07-02
Payer: COMMERCIAL

## 2024-07-02 DIAGNOSIS — R92.8 ABNORMAL SCREENING MAMMOGRAM: ICD-10-CM

## 2024-07-02 PROCEDURE — 76642 ULTRASOUND BREAST LIMITED: CPT

## 2024-07-12 ENCOUNTER — HOSPITAL ENCOUNTER (OUTPATIENT)
Dept: MRI IMAGING | Facility: HOSPITAL | Age: 54
End: 2024-07-12
Payer: COMMERCIAL

## 2024-07-12 VITALS — WEIGHT: 213 LBS | HEIGHT: 70 IN | BODY MASS INDEX: 30.49 KG/M2

## 2024-07-12 DIAGNOSIS — Z91.89 INCREASED RISK OF BREAST CANCER: ICD-10-CM

## 2024-07-12 DIAGNOSIS — N63.14 MASS OF LOWER INNER QUADRANT OF RIGHT BREAST: ICD-10-CM

## 2024-07-12 DIAGNOSIS — N63.25 MASS OVERLAPPING MULTIPLE QUADRANTS OF LEFT BREAST: ICD-10-CM

## 2024-07-12 DIAGNOSIS — Z98.82: ICD-10-CM

## 2024-07-12 DIAGNOSIS — R92.8 ABNORMAL MAMMOGRAM: ICD-10-CM

## 2024-07-12 PROCEDURE — C8908 MRI W/O FOL W/CONT, BREAST,: HCPCS

## 2024-07-12 PROCEDURE — A9585 GADOBUTROL INJECTION: HCPCS

## 2024-07-12 PROCEDURE — C8937 CAD BREAST MRI: HCPCS

## 2024-07-12 RX ORDER — GADOBUTROL 604.72 MG/ML
9 INJECTION INTRAVENOUS
Status: COMPLETED | OUTPATIENT
Start: 2024-07-12 | End: 2024-07-12

## 2024-07-12 RX ADMIN — GADOBUTROL 9 ML: 604.72 INJECTION INTRAVENOUS at 11:09

## 2024-07-17 LAB — GENE DIS ANL INTERP-IMP: NORMAL

## 2024-10-16 DIAGNOSIS — Z78.9 MALE-TO-FEMALE TRANSGENDER PERSON: ICD-10-CM

## 2024-10-18 RX ORDER — ESTRADIOL 2 MG/1
2 TABLET ORAL 2 TIMES DAILY
Qty: 180 TABLET | Refills: 3 | Status: SHIPPED | OUTPATIENT
Start: 2024-10-18

## 2024-10-25 ENCOUNTER — OFFICE VISIT (OUTPATIENT)
Dept: FAMILY MEDICINE CLINIC | Facility: CLINIC | Age: 54
End: 2024-10-25

## 2024-10-25 VITALS
DIASTOLIC BLOOD PRESSURE: 80 MMHG | HEIGHT: 70 IN | SYSTOLIC BLOOD PRESSURE: 160 MMHG | RESPIRATION RATE: 18 BRPM | WEIGHT: 217 LBS | TEMPERATURE: 98.7 F | BODY MASS INDEX: 31.07 KG/M2

## 2024-10-25 DIAGNOSIS — Z72.0 TOBACCO ABUSE: ICD-10-CM

## 2024-10-25 DIAGNOSIS — F51.01 PRIMARY INSOMNIA: ICD-10-CM

## 2024-10-25 DIAGNOSIS — Z86.73 HISTORY OF STROKE: ICD-10-CM

## 2024-10-25 DIAGNOSIS — E78.1 HYPERTRIGLYCERIDEMIA: ICD-10-CM

## 2024-10-25 DIAGNOSIS — I10 HTN, GOAL BELOW 140/90: ICD-10-CM

## 2024-10-25 DIAGNOSIS — E11.9 TYPE 2 DIABETES MELLITUS WITHOUT COMPLICATION, WITHOUT LONG-TERM CURRENT USE OF INSULIN (HCC): Primary | ICD-10-CM

## 2024-10-25 DIAGNOSIS — Z78.9 TRANSGENDER: ICD-10-CM

## 2024-10-25 DIAGNOSIS — M54.50 CHRONIC BILATERAL LOW BACK PAIN WITHOUT SCIATICA: ICD-10-CM

## 2024-10-25 DIAGNOSIS — G89.29 CHRONIC BILATERAL LOW BACK PAIN WITHOUT SCIATICA: ICD-10-CM

## 2024-10-25 DIAGNOSIS — Z78.9 MALE-TO-FEMALE TRANSGENDER PERSON: ICD-10-CM

## 2024-10-25 DIAGNOSIS — B35.3 TINEA PEDIS OF BOTH FEET: ICD-10-CM

## 2024-10-25 LAB — SL AMB POCT HEMOGLOBIN AIC: 10.7 (ref ?–6.5)

## 2024-10-25 PROCEDURE — 87186 SC STD MICRODIL/AGAR DIL: CPT | Performed by: OBSTETRICS & GYNECOLOGY

## 2024-10-25 PROCEDURE — 83036 HEMOGLOBIN GLYCOSYLATED A1C: CPT | Performed by: FAMILY MEDICINE

## 2024-10-25 PROCEDURE — 87086 URINE CULTURE/COLONY COUNT: CPT | Performed by: OBSTETRICS & GYNECOLOGY

## 2024-10-25 PROCEDURE — 99213 OFFICE O/P EST LOW 20 MIN: CPT | Performed by: FAMILY MEDICINE

## 2024-10-25 PROCEDURE — 87077 CULTURE AEROBIC IDENTIFY: CPT | Performed by: OBSTETRICS & GYNECOLOGY

## 2024-10-25 PROCEDURE — 88112 CYTOPATH CELL ENHANCE TECH: CPT | Performed by: STUDENT IN AN ORGANIZED HEALTH CARE EDUCATION/TRAINING PROGRAM

## 2024-10-25 RX ORDER — ESTRADIOL VALERATE 40 MG/ML
10 INJECTION INTRAMUSCULAR
Qty: 15 ML | Refills: 2 | Status: SHIPPED | OUTPATIENT
Start: 2024-10-25

## 2024-10-25 RX ORDER — NICOTINE 21 MG/24HR
1 PATCH, TRANSDERMAL 24 HOURS TRANSDERMAL EVERY 24 HOURS
Qty: 28 PATCH | Refills: 0 | Status: SHIPPED | OUTPATIENT
Start: 2024-10-25

## 2024-10-25 RX ORDER — PRENATAL VIT 91/IRON/FOLIC/DHA 28-975-200
COMBINATION PACKAGE (EA) ORAL 2 TIMES DAILY
Qty: 42 G | Refills: 2 | Status: SHIPPED | OUTPATIENT
Start: 2024-10-25

## 2024-10-25 RX ORDER — TRAMADOL HYDROCHLORIDE 50 MG/1
50 TABLET ORAL DAILY PRN
Qty: 30 TABLET | Refills: 0 | Status: CANCELLED | OUTPATIENT
Start: 2024-10-25

## 2024-10-25 RX ORDER — ZOLPIDEM TARTRATE 5 MG/1
5 TABLET ORAL
Qty: 30 TABLET | Refills: 0 | Status: CANCELLED | OUTPATIENT
Start: 2024-10-25

## 2024-10-25 RX ORDER — LIDOCAINE 50 MG/G
1 PATCH TOPICAL DAILY
Qty: 30 PATCH | Refills: 3 | Status: SHIPPED | OUTPATIENT
Start: 2024-10-25

## 2024-10-25 RX ORDER — TRAMADOL HYDROCHLORIDE 50 MG/1
50 TABLET ORAL EVERY 6 HOURS PRN
Status: CANCELLED | OUTPATIENT
Start: 2024-10-25

## 2024-10-25 RX ORDER — ROSUVASTATIN CALCIUM 20 MG/1
20 TABLET, COATED ORAL DAILY
Qty: 90 TABLET | Refills: 3 | Status: SHIPPED | OUTPATIENT
Start: 2024-10-25

## 2024-10-25 RX ORDER — ESTRADIOL VALERATE 40 MG/ML
10 INJECTION INTRAMUSCULAR
Qty: 15 ML | Refills: 2 | Status: SHIPPED | OUTPATIENT
Start: 2024-10-25 | End: 2024-10-25

## 2024-10-25 NOTE — PROGRESS NOTES
Ambulatory Visit  Name: Jo Matthew      : 1970      MRN: [id number]  Encounter Provider: Rebecca Fay Kab-Perlman, MD  Encounter Date: 10/25/2024   Encounter department: [facility]    Assessment & Plan  Type 2 diabetes mellitus without complication, without long-term current use of insulin (HCC)    Lab Results   Component Value Date    HGBA1C 10.7 (A) 10/25/2024     -Home medication of glipizide 10 mg daily (unable to tolerate metformin due to GI upset)  -Has been maintaining a poor diet lately and believes that is what is contributing to the high yet formally improved HbA1c    PLAN  -today opted into trying diet and lifestyle changes first and then will follow-up in 6 weeks to do a full diabetes assessment at today's visit was to establish care and refill medications    Orders:    POCT hemoglobin A1c    Hypertriglyceridemia  -Requested refill of her Crestor 20 mg daily which was provided today    Orders:    rosuvastatin (CRESTOR) 20 MG tablet; Take 1 tablet (20 mg total) by mouth daily    Tobacco abuse  -Provided today with nicotine patch, gum, lozenge with counseling and education  -Greater than 20-pack-year history  -Now down to half a pack per day  PLAN  -CT lung cancer screening was offered today however patient wants to think about it    PLAN  -On follow-up visit offer CT lung cancer screening again    Orders:    nicotine polacrilex (NICORETTE) 4 mg gum; Chew 1 each (4 mg total) as needed for smoking cessation    nicotine (NICODERM CQ) 14 mg/24hr TD 24 hr patch; Place 1 patch on the skin over 24 hours every 24 hours    nicotine polacrilex (COMMIT) 4 MG lozenge; Apply 1 lozenge (4 mg total) to the mouth or throat as needed for smoking cessation    Chronic bilateral low back pain without sciatica  -Severe, uses tramadol 50 mg as needed  -PDMP reviewed    PLAN  -Spoke with attending; refill was approved and pended tramadol    Orders:    lidocaine (Lidoderm) 5 %;  Apply 1 patch topically over 12 hours daily Remove & Discard patch within 12 hours or as directed by MD    traMADol (Ultram) 50 mg tablet; Take 1 tablet (50 mg total) by mouth daily as needed for moderate pain    Transgender  -Accidentally discontinued estradiol medication not given to her by another physician    PLAN  -Refilled estradiol medication to make sure patient gets it    Orders:    estradiol valerate (DELESTROGEN) 40 MG/ML injection; Inject 0.25 mL (10 mg total) into a muscle every 14 (fourteen) days    Tinea pedis of both feet  -Found on today's visit and provided with Lamisil 1% cream with education/counseling    Orders:    terbinafine (LamISIL) 1 % cream; Apply topically 2 (two) times a day    Primary insomnia  -On zolpidem 5 mg, uses when needed  -PDMP reviewed  -Did not have enough time today to review bedtime routine    PLAN  -On follow-up visit review bedtime routine       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  -With history of gender modifying surgery greater than 20 years ago  -On estradiol and spironolactone which she receives from another physician    History of stroke  -In 2008  -Currently on baby aspirin and rosuvastatin  -No long-term deficits    PLAN  -Continue aspirin and rosuvastatin  -Further chart review to delineate possible causes         HTN, goal below 140/90  -on spironolactone 100mg BID  -BP today 160/80 and not at goal <140/90 per DENISE    PLAN  -recommended home bp checks   -recheck bp on f/u visit            History of Present Illness       Jo Matthew is a 54 yo female patient presenting for this first time same day visit with me to establish care and obtain medication refills.  She is following with gynecology and saw them this morning regarding her urethra bleeding and possible prolapsed vagina    History obtained from : patient  Review of Systems   Constitutional:  Negative for fever and unexpected weight change.   Respiratory:  Negative for shortness of breath.     Cardiovascular:  Negative for chest pain, palpitations and leg swelling.   Musculoskeletal:  Positive for back pain.   Skin:  Positive for rash (on bilateral feet).   Neurological:  Negative for weakness.   Hematological:  Does not bruise/bleed easily.     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     Past Surgical History:   Procedure Laterality Date    BREAST IMPLANT REMOVAL Bilateral     BREAST SURGERY      TONSILLECTOMY       Family History   Problem Relation Age of Onset    Breast cancer Mother 72    No Known Problems Brother     No Known Problems Maternal Grandmother     Colon cancer Maternal Grandfather 75    Breast cancer Paternal Grandmother 80    Colon cancer Paternal Grandfather 74    Breast cancer Maternal Aunt 32    Breast cancer Maternal Aunt 33    No Known Problems Maternal Aunt     No Known Problems Maternal Aunt     No Known Problems Maternal Aunt     No Known Problems Maternal Aunt     Uterine cancer Paternal Aunt     No Known Problems Paternal Aunt     No Known Problems Paternal Aunt     No Known Problems Paternal Aunt     No Known Problems Paternal Aunt     No Known Problems Paternal Aunt     No Known Problems Paternal Aunt     Cancer Family         Unknown    Stroke Family     Depression Family     Diabetes Family         Mellitus    Hypertension Family     Mental illness Family     No Known Problems Half-Sister     No Known Problems Half-Brother     No Known Problems Half-Brother     No Known Problems Half-Brother      Current Outpatient Medications on File Prior to Visit   Medication Sig Dispense Refill    aspirin (Aspirin Adult Low Strength) 81 mg EC tablet Take 1 tablet (81 mg total) by mouth daily 90 tablet 2    estradiol (ESTRACE) 2  "MG tablet TAKE 1 TABLET BY MOUTH TWICE A  tablet 3    glipiZIDE (GLUCOTROL XL) 10 mg 24 hr tablet Take 1 tablet (10 mg total) by mouth daily 90 tablet 3    levothyroxine 150 mcg tablet Take 1 tablet (150 mcg total) by mouth daily 90 tablet 10    spironolactone (ALDACTONE) 100 mg tablet Take 1 tablet (100 mg total) by mouth 2 (two) times a day 180 tablet 3    Blood Glucose Monitoring Suppl (ONETOUCH VERIO FLEX SYSTEM) w/Device KIT by Other route daily 1 kit 0    fenofibrate (TRICOR) 145 mg tablet Take 1 tablet (145 mg total) by mouth daily (Patient not taking: Reported on 10/25/2024) 90 tablet 3    glucose blood (OneTouch Verio) test strip 1 each by Other route daily Use as instructed (Patient not taking: Reported on 11/19/2023) 50 each 3    OneTouch Delica Lancets 33G MISC by Does not apply route daily (Patient not taking: Reported on 11/19/2023) 100 each 1    SYRINGE-NEEDLE, DISP, 3 ML (SAFETY-LUCIANO 3CC SYR 21GX1.5\") 21G X 1-1/2\" 3 ML MISC Use as directed 30 each 0    zolpidem (AMBIEN) 5 mg tablet TAKE 1 TABLET BY MOUTH DAILY AT BEDTIME AS NEEDED FOR SLEEP 30 tablet 0     No current facility-administered medications on file prior to visit.     Allergies   Allergen Reactions    Bee Venom Anaphylaxis    Penicillins Seizures    Pineapple - Food Allergy Anaphylaxis    Keflex [Cephalexin] GI Intolerance    Latex Hives    Other       Current Outpatient Medications on File Prior to Visit   Medication Sig Dispense Refill    aspirin (Aspirin Adult Low Strength) 81 mg EC tablet Take 1 tablet (81 mg total) by mouth daily 90 tablet 2    estradiol (ESTRACE) 2 MG tablet TAKE 1 TABLET BY MOUTH TWICE A  tablet 3    glipiZIDE (GLUCOTROL XL) 10 mg 24 hr tablet Take 1 tablet (10 mg total) by mouth daily 90 tablet 3    levothyroxine 150 mcg tablet Take 1 tablet (150 mcg total) by mouth daily 90 tablet 10    spironolactone (ALDACTONE) 100 mg tablet Take 1 tablet (100 mg total) by mouth 2 (two) times a day 180 tablet 3    " "Blood Glucose Monitoring Suppl (ONETOUCH VERIO FLEX SYSTEM) w/Device KIT by Other route daily 1 kit 0    fenofibrate (TRICOR) 145 mg tablet Take 1 tablet (145 mg total) by mouth daily (Patient not taking: Reported on 10/25/2024) 90 tablet 3    glucose blood (OneTouch Verio) test strip 1 each by Other route daily Use as instructed (Patient not taking: Reported on 11/19/2023) 50 each 3    OneTouch Delica Lancets 33G MISC by Does not apply route daily (Patient not taking: Reported on 11/19/2023) 100 each 1    SYRINGE-NEEDLE, DISP, 3 ML (SAFETY-LUCIANO 3CC SYR 21GX1.5\") 21G X 1-1/2\" 3 ML MISC Use as directed 30 each 0    zolpidem (AMBIEN) 5 mg tablet TAKE 1 TABLET BY MOUTH DAILY AT BEDTIME AS NEEDED FOR SLEEP 30 tablet 0     No current facility-administered medications on file prior to visit.      Social History     Tobacco Use    Smoking status: Every Day     Current packs/day: 0.50     Types: Cigarettes     Passive exposure: Current    Smokeless tobacco: Never    Tobacco comments:     10 packs/80.00 year   Vaping Use    Vaping status: Never Used   Substance and Sexual Activity    Alcohol use: Yes     Alcohol/week: 1.0 standard drink of alcohol     Types: 1 Glasses of wine per week    Drug use: No    Sexual activity: Yes     Partners: Male     Birth control/protection: Condom Male         Objective     /80 (BP Location: Right arm, Patient Position: Sitting, Cuff Size: Standard)   Temp 98.7 °F (37.1 °C) (Temporal)   Resp 18   Ht 5' 10\" (1.778 m)   Wt 98.4 kg (217 lb)   BMI 31.14 kg/m²     Physical Exam  Constitutional:       Appearance: Normal appearance.   HENT:      Head: Normocephalic and atraumatic.      Right Ear: External ear normal.      Left Ear: External ear normal.      Nose: Nose normal.      Mouth/Throat:      Mouth: Mucous membranes are moist.      Pharynx: Oropharynx is clear.   Eyes:      Conjunctiva/sclera: Conjunctivae normal.   Cardiovascular:      Rate and Rhythm: Normal rate and regular " rhythm.      Heart sounds: No murmur heard.     No friction rub. No gallop.   Pulmonary:      Effort: Pulmonary effort is normal.      Breath sounds: No wheezing, rhonchi or rales.      Comments: -  Abdominal:      General: Abdomen is flat. There is no distension.      Palpations: Abdomen is soft.      Tenderness: There is no abdominal tenderness.   Musculoskeletal:      Cervical back: Normal range of motion. No rigidity or tenderness.      Comments: -No point tenderness over vertebral spines  -Tenderness to palpation of paraspinal muscles  -Difficulty with spinal extension, flexion, sidebending, and rotation   Lymphadenopathy:      Cervical: No cervical adenopathy.   Skin:     General: Skin is warm.      Findings: Rash (erythematous moccasin distribution rash on bilateral feets with flaky/peeling skin and states pruritic) present.   Neurological:      General: No focal deficit present.      Mental Status: She is alert and oriented to person, place, and time.      Cranial Nerves: No cranial nerve deficit.      Motor: No weakness.      Gait: Gait normal.   Psychiatric:         Mood and Affect: Mood normal.         Behavior: Behavior normal.         Thought Content: Thought content normal.         Judgment: Judgment normal.

## 2024-10-25 NOTE — ASSESSMENT & PLAN NOTE
-On zolpidem 5 mg, uses when needed  -PDMP reviewed  -Did not have enough time today to review bedtime routine    PLAN  -On follow-up visit review bedtime routine

## 2024-10-25 NOTE — ASSESSMENT & PLAN NOTE
-Provided today with nicotine patch, gum, lozenge with counseling and education  -Greater than 20-pack-year history  -Now down to half a pack per day  PLAN  -CT lung cancer screening was offered today however patient wants to think about it    PLAN  -On follow-up visit offer CT lung cancer screening again    Orders:    nicotine polacrilex (NICORETTE) 4 mg gum; Chew 1 each (4 mg total) as needed for smoking cessation    nicotine (NICODERM CQ) 14 mg/24hr TD 24 hr patch; Place 1 patch on the skin over 24 hours every 24 hours    nicotine polacrilex (COMMIT) 4 MG lozenge; Apply 1 lozenge (4 mg total) to the mouth or throat as needed for smoking cessation

## 2024-10-25 NOTE — ASSESSMENT & PLAN NOTE
-Severe, uses tramadol 50 mg as needed  -PDMP reviewed    PLAN  -Spoke with attending; refill was approved and pended tramadol    Orders:    lidocaine (Lidoderm) 5 %; Apply 1 patch topically over 12 hours daily Remove & Discard patch within 12 hours or as directed by MD    traMADol (Ultram) 50 mg tablet; Take 1 tablet (50 mg total) by mouth daily as needed for moderate pain

## 2024-10-25 NOTE — ASSESSMENT & PLAN NOTE
Lab Results   Component Value Date    HGBA1C 10.7 (A) 10/25/2024     -Home medication of glipizide 10 mg daily (unable to tolerate metformin due to GI upset)  -Has been maintaining a poor diet lately and believes that is what is contributing to the high yet formally improved HbA1c    PLAN  -today opted into trying diet and lifestyle changes first and then will follow-up in 6 weeks to do a full diabetes assessment at today's visit was to establish care and refill medications    Orders:    POCT hemoglobin A1c

## 2024-10-25 NOTE — ASSESSMENT & PLAN NOTE
-Requested refill of her Crestor 20 mg daily which was provided today    Orders:    rosuvastatin (CRESTOR) 20 MG tablet; Take 1 tablet (20 mg total) by mouth daily

## 2024-10-28 ENCOUNTER — TELEPHONE (OUTPATIENT)
Dept: FAMILY MEDICINE CLINIC | Facility: CLINIC | Age: 54
End: 2024-10-28

## 2024-10-28 NOTE — TELEPHONE ENCOUNTER
Pt called into office and stated she did not receive the following medications at the pharmacy, please advise.     traMADol (Ultram) 50 mg tablet     terbinafine (LamISIL) 1 % cream

## 2024-10-28 NOTE — TELEPHONE ENCOUNTER
Patient would like to know when will her medications be sent to pharmacy due to in a lot of pain. Please advise.

## 2024-10-30 PROBLEM — Z86.73 HISTORY OF STROKE: Status: ACTIVE | Noted: 2024-10-30

## 2024-10-30 PROCEDURE — 88112 CYTOPATH CELL ENHANCE TECH: CPT | Performed by: STUDENT IN AN ORGANIZED HEALTH CARE EDUCATION/TRAINING PROGRAM

## 2024-10-30 RX ORDER — TRAMADOL HYDROCHLORIDE 50 MG/1
50 TABLET ORAL DAILY PRN
Qty: 30 TABLET | Refills: 0 | Status: SHIPPED | OUTPATIENT
Start: 2024-10-30

## 2024-10-30 NOTE — ASSESSMENT & PLAN NOTE
-on spironolactone 100mg BID  -BP today 160/80 and not at goal <140/90 per DENISE    PLAN  -recommended home bp checks   -recheck bp on f/u visit

## 2024-10-30 NOTE — ASSESSMENT & PLAN NOTE
-With history of gender modifying surgery greater than 20 years ago  -On estradiol and spironolactone which she receives from another physician

## 2024-10-30 NOTE — ASSESSMENT & PLAN NOTE
-In 2008  -Currently on baby aspirin and rosuvastatin  -No long-term deficits    PLAN  -Continue aspirin and rosuvastatin  -Further chart review to delineate possible causes

## 2024-11-06 ENCOUNTER — TELEPHONE (OUTPATIENT)
Dept: FAMILY MEDICINE CLINIC | Facility: CLINIC | Age: 54
End: 2024-11-06

## 2024-11-06 NOTE — TELEPHONE ENCOUNTER
"The patient was registered for an appointment with Dr. Bob at 11:20 AM.    When the patient approached me, she was frustrated and said, \"Can you verify when I'm going to be seen? I was told to come in at 10:30, and no one has called me.\"    I immediately took the initiative to check her appointment details and confirm whether the provider was running behind schedule. It was unusual for the patient not to have been called, as Dr. Bob is consistently punctual for appointments. I sent a message to the clinical team for clarification but received no response. I decided to walk over to pod 2 to get more information. Deana GARCIA confirmed that the patient's appointment was indeed scheduled for 11:20, and she was about to call the patient for triage.    When Deana called the patient, the patient expressed her dissatisfaction, stating, \"I don't want to be seen. I've been waiting all this time, and my appointment was for 10:30, as I was told.\" At this point, Deana briefed me on the situation, and I took over to address the issue directly.    I reviewed the patient's chart thoroughly to find any information related to her claimed appointment time. I informed her that there was no documentation indicating her appointment was set for 10:30. The patient insisted, \"Verify the message from Carmen; it should be in my record. The doctor said it was okay for me to come in at 10:30. Can't you see it?\"    I clarified that I was actively looking into her chart and found no record of any communication from Carmen or the provider regarding the 10:30 appointment. It appeared that Carmen may have sent a message via Teams or an Epic secure chat, which would not be accessible to others. I explained this to the patient, and she then presented a text message from staff stating the provider was aware of her situation and that she could be seen at 10:30.    I firmly apologized to the patient for the miscommunication from the staff about " her appointment time. I explained that without documented communication in her chart from the nursing team or the provider, we could not validate her claims regarding the appointment arrangements.    The patient acknowledged my explanation but remained upset. As a resolution, I arranged for her copayment to be refunded, and she was rescheduled for a new appointment.

## 2024-11-11 ENCOUNTER — OFFICE VISIT (OUTPATIENT)
Dept: FAMILY MEDICINE CLINIC | Facility: CLINIC | Age: 54
End: 2024-11-11

## 2024-11-11 VITALS
HEART RATE: 95 BPM | RESPIRATION RATE: 16 BRPM | HEIGHT: 70 IN | WEIGHT: 215 LBS | SYSTOLIC BLOOD PRESSURE: 150 MMHG | DIASTOLIC BLOOD PRESSURE: 80 MMHG | TEMPERATURE: 97.6 F | OXYGEN SATURATION: 95 % | BODY MASS INDEX: 30.78 KG/M2

## 2024-11-11 DIAGNOSIS — F11.90 CHRONIC, CONTINUOUS USE OF OPIOIDS: ICD-10-CM

## 2024-11-11 DIAGNOSIS — E78.1 HYPERTRIGLYCERIDEMIA: ICD-10-CM

## 2024-11-11 DIAGNOSIS — G89.29 CHRONIC BILATERAL LOW BACK PAIN WITHOUT SCIATICA: ICD-10-CM

## 2024-11-11 DIAGNOSIS — Z23 ENCOUNTER FOR IMMUNIZATION: ICD-10-CM

## 2024-11-11 DIAGNOSIS — E11.9 TYPE 2 DIABETES MELLITUS WITHOUT COMPLICATION, WITHOUT LONG-TERM CURRENT USE OF INSULIN (HCC): ICD-10-CM

## 2024-11-11 DIAGNOSIS — M54.50 CHRONIC BILATERAL LOW BACK PAIN WITHOUT SCIATICA: ICD-10-CM

## 2024-11-11 DIAGNOSIS — E03.9 ACQUIRED HYPOTHYROIDISM: ICD-10-CM

## 2024-11-11 DIAGNOSIS — Z72.0 TOBACCO ABUSE: ICD-10-CM

## 2024-11-11 DIAGNOSIS — I10 HTN, GOAL BELOW 140/90: Primary | ICD-10-CM

## 2024-11-11 LAB
CREAT UR-MCNC: 65.9 MG/DL
MICROALBUMIN UR-MCNC: 1171.5 MG/L
MICROALBUMIN/CREAT 24H UR: 1778 MG/G CREATININE (ref 0–30)

## 2024-11-11 PROCEDURE — 99214 OFFICE O/P EST MOD 30 MIN: CPT | Performed by: FAMILY MEDICINE

## 2024-11-11 PROCEDURE — 82570 ASSAY OF URINE CREATININE: CPT | Performed by: FAMILY MEDICINE

## 2024-11-11 PROCEDURE — 90472 IMMUNIZATION ADMIN EACH ADD: CPT | Performed by: FAMILY MEDICINE

## 2024-11-11 PROCEDURE — 90673 RIV3 VACCINE NO PRESERV IM: CPT | Performed by: FAMILY MEDICINE

## 2024-11-11 PROCEDURE — 90471 IMMUNIZATION ADMIN: CPT | Performed by: FAMILY MEDICINE

## 2024-11-11 PROCEDURE — 82043 UR ALBUMIN QUANTITATIVE: CPT | Performed by: FAMILY MEDICINE

## 2024-11-11 PROCEDURE — 90750 HZV VACC RECOMBINANT IM: CPT | Performed by: FAMILY MEDICINE

## 2024-11-11 RX ORDER — FENOFIBRATE 145 MG/1
145 TABLET, COATED ORAL DAILY
Qty: 90 TABLET | Refills: 3 | Status: SHIPPED | OUTPATIENT
Start: 2024-11-11

## 2024-11-11 RX ORDER — CYCLOBENZAPRINE HCL 10 MG
10 TABLET ORAL 3 TIMES DAILY PRN
Qty: 30 TABLET | Refills: 1 | Status: SHIPPED | OUTPATIENT
Start: 2024-11-11

## 2024-11-11 NOTE — ASSESSMENT & PLAN NOTE
Continue levothyroxine  Recheck thyroid function  Orders:    TSH, 3rd generation; Future    T4, free; Future

## 2024-11-11 NOTE — ASSESSMENT & PLAN NOTE
Lab Results   Component Value Date    HGBA1C 10.7 (A) 10/25/2024     Poorly controlled  Continue glipizide  Recommend low carbohydrate diet  Exercise counseling provided patient  Orders:    Comprehensive metabolic panel; Future    CBC and differential; Future    Albumin / creatinine urine ratio; Future    Albumin / creatinine urine ratio

## 2024-11-11 NOTE — ASSESSMENT & PLAN NOTE
Smokes 1pack per day of cigarettes  Continue using NRT  Smoking cessation counseling provided

## 2024-11-11 NOTE — ASSESSMENT & PLAN NOTE
Blood pressure elevated  Continue spironolactone  Consider ACE-I/ARB next visit  Recommend ambulatory blood pressure monitoring    Orders:    Comprehensive metabolic panel; Future

## 2024-11-11 NOTE — ASSESSMENT & PLAN NOTE
Recommend low-fat diet  Recheck lipid panel  Continue fenofibrate  Orders:    fenofibrate (TRICOR) 145 mg tablet; Take 1 tablet (145 mg total) by mouth daily    Lipid panel; Future

## 2024-11-11 NOTE — ASSESSMENT & PLAN NOTE
Continue tramadol  Patient has opioid agreement contract  Recommend Flexeril  Orders:    cyclobenzaprine (FLEXERIL) 10 mg tablet; Take 1 tablet (10 mg total) by mouth 3 (three) times a day as needed for muscle spasms

## 2024-11-11 NOTE — PROGRESS NOTES
Ambulatory Visit  Name: Jo Matthew      : 1970      MRN: [id number]  Encounter Provider: Angel Bob MD  Encounter Date: 2024   Encounter department: [facility]    Assessment & Plan  HTN, goal below 140/90  Blood pressure elevated  Continue spironolactone  Consider ACE-I/ARB next visit  Recommend ambulatory blood pressure monitoring    Orders:    Comprehensive metabolic panel; Future    Type 2 diabetes mellitus without complication, without long-term current use of insulin (HCC)    Lab Results   Component Value Date    HGBA1C 10.7 (A) 10/25/2024     Poorly controlled  Continue glipizide  Recommend low carbohydrate diet  Exercise counseling provided patient  Orders:    Comprehensive metabolic panel; Future    CBC and differential; Future    Albumin / creatinine urine ratio; Future    Albumin / creatinine urine ratio    Acquired hypothyroidism  Continue levothyroxine  Recheck thyroid function  Orders:    TSH, 3rd generation; Future    T4, free; Future    Tobacco abuse  Smokes 1pack per day of cigarettes  Continue using NRT  Smoking cessation counseling provided       Hypertriglyceridemia  Recommend low-fat diet  Recheck lipid panel  Continue fenofibrate  Orders:    fenofibrate (TRICOR) 145 mg tablet; Take 1 tablet (145 mg total) by mouth daily    Lipid panel; Future    Chronic bilateral low back pain without sciatica  Continue tramadol  Patient has opioid agreement contract  Recommend Flexeril  Orders:    cyclobenzaprine (FLEXERIL) 10 mg tablet; Take 1 tablet (10 mg total) by mouth 3 (three) times a day as needed for muscle spasms    Encounter for immunization    Orders:    influenza vaccine, recombinant, PF, 0.5 mL IM (Flublok)    Zoster Vaccine Recombinant IM    Chronic, continuous use of opioids    Orders:    Drug Screen Routine w /Conf and Adulteration, urine.; Future    BMI Counseling: Body mass index is 30.85 kg/m². The BMI is above normal.  "Nutrition recommendations include decreasing portion sizes, encouraging healthy choices of fruits and vegetables, decreasing fast food intake, consuming healthier snacks, limiting drinks that contain sugar, moderation in carbohydrate intake and reducing intake of cholesterol. Exercise recommendations include moderate physical activity 150 minutes/week. Rationale for BMI follow-up plan is due to patient being overweight or obese.       History of Present Illness     53-year-old female with a history of diabetes, hypothyroidism, and tobacco abuse who presents today for follow-up.  Patient has chronic low back pain.  She takes tramadol as needed for pain relief.  Patient reports poor diet for the past few months.  Her diet has been poorly controlled after her father passed away a while ago.  Patient is very motivated to improve her diet and reduce her hemoglobin A1c level.          Review of Systems   Constitutional:  Negative for appetite change, chills, diaphoresis, fatigue and fever.   HENT:  Negative for congestion, ear pain, hearing loss, sore throat and trouble swallowing.    Respiratory:  Negative for cough, shortness of breath and wheezing.    Cardiovascular:  Negative for chest pain, palpitations and leg swelling.   Gastrointestinal:  Negative for abdominal pain, nausea and vomiting.   Endocrine: Negative for cold intolerance, heat intolerance, polydipsia, polyphagia and polyuria.   Genitourinary:  Negative for difficulty urinating, pelvic pain, vaginal bleeding and vaginal discharge.   Musculoskeletal:  Positive for back pain.   Skin:  Negative for rash.   Neurological:  Negative for dizziness, syncope, weakness, numbness and headaches.   Psychiatric/Behavioral:  Negative for dysphoric mood.            Objective     /80 (BP Location: Right arm, Patient Position: Sitting, Cuff Size: Large)   Temp 97.6 °F (36.4 °C) (Temporal)   Resp 16   Ht 5' 10\" (1.778 m)   Wt 97.5 kg (215 lb)   BMI 30.85 kg/m² "     Physical Exam  Vitals and nursing note reviewed.   Constitutional:       General: She is not in acute distress.     Appearance: Normal appearance. She is well-developed. She is not toxic-appearing or diaphoretic.   HENT:      Head: Normocephalic and atraumatic.      Right Ear: External ear normal.      Left Ear: External ear normal.   Eyes:      General: No scleral icterus.        Right eye: No discharge.         Left eye: No discharge.      Extraocular Movements: Extraocular movements intact.      Conjunctiva/sclera: Conjunctivae normal.   Cardiovascular:      Rate and Rhythm: Normal rate and regular rhythm.      Pulses: no weak pulses.           Dorsalis pedis pulses are 2+ on the right side and 2+ on the left side.        Posterior tibial pulses are 2+ on the right side and 2+ on the left side.      Heart sounds: Normal heart sounds. No murmur heard.     No friction rub. No gallop.   Pulmonary:      Effort: Pulmonary effort is normal. No respiratory distress.      Breath sounds: Normal breath sounds. No stridor. No wheezing or rhonchi.   Abdominal:      General: Bowel sounds are normal. There is no distension.      Palpations: Abdomen is soft. There is no mass.      Tenderness: There is no abdominal tenderness.      Hernia: No hernia is present.   Musculoskeletal:         General: Normal range of motion.      Cervical back: Normal range of motion.      Right lower leg: No edema.      Left lower leg: No edema.   Feet:      Right foot:      Skin integrity: No ulcer, skin breakdown, erythema, warmth, callus or dry skin.      Left foot:      Skin integrity: No ulcer, skin breakdown, erythema, warmth, callus or dry skin.   Skin:     General: Skin is warm.      Capillary Refill: Capillary refill takes less than 2 seconds.   Neurological:      General: No focal deficit present.      Mental Status: She is alert.      Cranial Nerves: No cranial nerve deficit.      Motor: No weakness.      Gait: Gait normal.    Psychiatric:         Mood and Affect: Mood normal.         Behavior: Behavior normal.         Diabetic Foot Exam    Patient's shoes and socks removed.    Right Foot/Ankle   Right Foot Inspection  Skin Exam: skin normal and skin intact. No dry skin, no warmth, no callus, no erythema, no maceration, no abnormal color, no pre-ulcer, no ulcer and no callus.     Toe Exam: ROM and strength within normal limits. No swelling, no tenderness, erythema and  no right toe deformity    Sensory   Proprioception: intact  Monofilament testing: intact    Vascular  Capillary refills: < 3 seconds  The right DP pulse is 2+. The right PT pulse is 2+.     Left Foot/Ankle  Left Foot Inspection  Skin Exam: skin normal and skin intact. No dry skin, no warmth, no erythema, no maceration, normal color, no pre-ulcer, no ulcer and no callus.     Toe Exam: ROM and strength within normal limits. No swelling, no tenderness, no erythema and no left toe deformity.     Sensory   Proprioception: intact  Monofilament testing: intact    Vascular  Capillary refills: < 3 seconds  The left DP pulse is 2+. The left PT pulse is 2+.     Assign Risk Category  No deformity present  No loss of protective sensation  No weak pulses  Risk: 0

## 2024-11-20 ENCOUNTER — PATIENT MESSAGE (OUTPATIENT)
Dept: OBGYN CLINIC | Facility: CLINIC | Age: 54
End: 2024-11-20

## 2024-11-22 ENCOUNTER — TELEPHONE (OUTPATIENT)
Age: 54
End: 2024-11-22

## 2024-11-22 NOTE — TELEPHONE ENCOUNTER
New Patient    What is the reason for the patient’s appointment? NP- hematuria and pelvic pain    What office location does the patient prefer? [place]    Does patient have Imaging/Lab Results:  In Epic     Have patient records been requested?  If No, are the records showing in Epic:   In Epic     INSURANCE:   Do we accept the patient's insurance or is the patient Self-Pay?   [employer]      HISTORY:   Has the patient had any previous Urologist(s)? Yes in [place]    Was the patient seen in the ED? No     Has the patient had any outside testing done? No     Does the patient have a personal history of cancer? No

## 2024-11-25 ENCOUNTER — TELEPHONE (OUTPATIENT)
Dept: OTHER | Facility: OTHER | Age: 54
End: 2024-11-25

## 2024-11-25 NOTE — TELEPHONE ENCOUNTER
Spoke with pt and advised no sooner openings than 12-12. Confirmed pt is on the wait list and if anything opens up sooner we will be in contact.

## 2024-11-25 NOTE — TELEPHONE ENCOUNTER
Patient called in stating she has to reschedule her appointment at 11am today due to conflicting appointment with her mother. Rescheduled for 12/12/2024 but patient is requesting for a sooner appointment. Placed on waitlist

## 2024-12-05 ENCOUNTER — APPOINTMENT (OUTPATIENT)
Dept: LAB | Facility: HOSPITAL | Age: 54
End: 2024-12-05
Payer: COMMERCIAL

## 2024-12-05 DIAGNOSIS — E78.1 HYPERTRIGLYCERIDEMIA: ICD-10-CM

## 2024-12-05 DIAGNOSIS — E11.9 TYPE 2 DIABETES MELLITUS WITHOUT COMPLICATION, WITHOUT LONG-TERM CURRENT USE OF INSULIN (HCC): Primary | ICD-10-CM

## 2024-12-05 DIAGNOSIS — R10.2 PELVIC PAIN: ICD-10-CM

## 2024-12-05 DIAGNOSIS — E03.9 ACQUIRED HYPOTHYROIDISM: ICD-10-CM

## 2024-12-05 LAB
ALBUMIN SERPL BCG-MCNC: 4 G/DL (ref 3.5–5)
ALP SERPL-CCNC: 52 U/L (ref 34–104)
ALT SERPL W P-5'-P-CCNC: 9 U/L (ref 7–52)
ANION GAP SERPL CALCULATED.3IONS-SCNC: 9 MMOL/L (ref 4–13)
AST SERPL W P-5'-P-CCNC: 6 U/L (ref 13–39)
BASOPHILS # BLD AUTO: 0.09 THOUSANDS/ÂΜL (ref 0–0.1)
BASOPHILS NFR BLD AUTO: 1 % (ref 0–1)
BILIRUB SERPL-MCNC: 0.31 MG/DL (ref 0.2–1)
BUN SERPL-MCNC: 20 MG/DL (ref 5–25)
CALCIUM SERPL-MCNC: 9.2 MG/DL (ref 8.4–10.2)
CHLORIDE SERPL-SCNC: 97 MMOL/L (ref 96–108)
CHOLEST SERPL-MCNC: 426 MG/DL (ref ?–200)
CO2 SERPL-SCNC: 26 MMOL/L (ref 21–32)
CREAT SERPL-MCNC: 0.69 MG/DL (ref 0.6–1.3)
EOSINOPHIL # BLD AUTO: 0.33 THOUSAND/ÂΜL (ref 0–0.61)
EOSINOPHIL NFR BLD AUTO: 3 % (ref 0–6)
ERYTHROCYTE [DISTWIDTH] IN BLOOD BY AUTOMATED COUNT: 12.6 % (ref 11.6–15.1)
GFR SERPL CREATININE-BSD FRML MDRD: 99 ML/MIN/1.73SQ M
GLUCOSE P FAST SERPL-MCNC: 281 MG/DL (ref 65–99)
HCT VFR BLD AUTO: 39.8 % (ref 34.8–46.1)
HDLC SERPL-MCNC: 44 MG/DL
HGB BLD-MCNC: 13.6 G/DL (ref 11.5–15.4)
IMM GRANULOCYTES # BLD AUTO: 0.03 THOUSAND/UL (ref 0–0.2)
IMM GRANULOCYTES NFR BLD AUTO: 0 % (ref 0–2)
LYMPHOCYTES # BLD AUTO: 4.25 THOUSANDS/ÂΜL (ref 0.6–4.47)
LYMPHOCYTES NFR BLD AUTO: 40 % (ref 14–44)
MCH RBC QN AUTO: 31 PG (ref 26.8–34.3)
MCHC RBC AUTO-ENTMCNC: 34.2 G/DL (ref 31.4–37.4)
MCV RBC AUTO: 91 FL (ref 82–98)
MONOCYTES # BLD AUTO: 0.67 THOUSAND/ÂΜL (ref 0.17–1.22)
MONOCYTES NFR BLD AUTO: 6 % (ref 4–12)
NEUTROPHILS # BLD AUTO: 5.18 THOUSANDS/ÂΜL (ref 1.85–7.62)
NEUTS SEG NFR BLD AUTO: 50 % (ref 43–75)
NONHDLC SERPL-MCNC: 382 MG/DL
NRBC BLD AUTO-RTO: 0 /100 WBCS
PLATELET # BLD AUTO: 370 THOUSANDS/UL (ref 149–390)
PMV BLD AUTO: 9.8 FL (ref 8.9–12.7)
POTASSIUM SERPL-SCNC: 4 MMOL/L (ref 3.5–5.3)
PROT SERPL-MCNC: 6.8 G/DL (ref 6.4–8.4)
RBC # BLD AUTO: 4.39 MILLION/UL (ref 3.81–5.12)
SODIUM SERPL-SCNC: 132 MMOL/L (ref 135–147)
T4 FREE SERPL-MCNC: 0.87 NG/DL (ref 0.61–1.12)
TRIGL SERPL-MCNC: 1871 MG/DL (ref ?–150)
TSH SERPL DL<=0.05 MIU/L-ACNC: 6.33 UIU/ML (ref 0.45–4.5)
WBC # BLD AUTO: 10.55 THOUSAND/UL (ref 4.31–10.16)

## 2024-12-05 PROCEDURE — 80061 LIPID PANEL: CPT

## 2024-12-05 PROCEDURE — 84443 ASSAY THYROID STIM HORMONE: CPT

## 2024-12-05 PROCEDURE — 85025 COMPLETE CBC W/AUTO DIFF WBC: CPT

## 2024-12-05 PROCEDURE — 80053 COMPREHEN METABOLIC PANEL: CPT

## 2024-12-05 PROCEDURE — 84439 ASSAY OF FREE THYROXINE: CPT

## 2024-12-09 ENCOUNTER — HOSPITAL ENCOUNTER (OUTPATIENT)
Dept: RADIOLOGY | Facility: HOSPITAL | Age: 54
Discharge: HOME | End: 2024-12-09
Attending: OBSTETRICS & GYNECOLOGY
Payer: COMMERCIAL

## 2024-12-09 DIAGNOSIS — R10.2 PELVIC PAIN: ICD-10-CM

## 2024-12-09 PROCEDURE — 74178 CT ABD&PLV WO CNTR FLWD CNTR: CPT

## 2024-12-09 RX ADMIN — IOHEXOL 100 ML: 350 INJECTION, SOLUTION INTRAVENOUS at 12:50

## 2024-12-10 DIAGNOSIS — G89.29 CHRONIC BILATERAL LOW BACK PAIN WITHOUT SCIATICA: ICD-10-CM

## 2024-12-10 DIAGNOSIS — M54.50 CHRONIC BILATERAL LOW BACK PAIN WITHOUT SCIATICA: ICD-10-CM

## 2024-12-11 RX ORDER — TRAMADOL HYDROCHLORIDE 50 MG/1
50 TABLET ORAL DAILY PRN
Qty: 30 TABLET | Refills: 0 | Status: SHIPPED | OUTPATIENT
Start: 2024-12-11

## 2024-12-12 ENCOUNTER — OFFICE VISIT (OUTPATIENT)
Dept: UROLOGY | Facility: MEDICAL CENTER | Age: 54
End: 2024-12-12
Payer: COMMERCIAL

## 2024-12-12 VITALS
WEIGHT: 209 LBS | SYSTOLIC BLOOD PRESSURE: 130 MMHG | DIASTOLIC BLOOD PRESSURE: 68 MMHG | HEIGHT: 70 IN | BODY MASS INDEX: 29.92 KG/M2

## 2024-12-12 DIAGNOSIS — R10.2 PELVIC PAIN: ICD-10-CM

## 2024-12-12 DIAGNOSIS — R31.0 GROSS HEMATURIA: ICD-10-CM

## 2024-12-12 DIAGNOSIS — N40.0 BENIGN PROSTATIC HYPERPLASIA WITHOUT LOWER URINARY TRACT SYMPTOMS: Primary | ICD-10-CM

## 2024-12-12 DIAGNOSIS — N30.01 ACUTE CYSTITIS WITH HEMATURIA: Primary | ICD-10-CM

## 2024-12-12 PROCEDURE — 52000 CYSTOURETHROSCOPY: CPT | Performed by: UROLOGY

## 2024-12-12 PROCEDURE — 99204 OFFICE O/P NEW MOD 45 MIN: CPT | Performed by: UROLOGY

## 2024-12-12 RX ORDER — SULFAMETHOXAZOLE AND TRIMETHOPRIM 800; 160 MG/1; MG/1
1 TABLET ORAL EVERY 12 HOURS SCHEDULED
Qty: 14 TABLET | Refills: 0 | Status: SHIPPED | OUTPATIENT
Start: 2024-12-12 | End: 2024-12-19

## 2024-12-12 NOTE — PROGRESS NOTES
History:    Patient is a transgender woman who underwent sexual reassignment surgery many years ago in [facility].  Several procedures after that as well.    Current problem is blood she seen either from the urethra or vagina several times.  She thinks perhaps she saw blood in the toilet bowl as well.    Overall she has had good urinary function, good stream control.  Occasional urgency but not bothered.    No history of stones.  Positive urine culture for Klebsiella and E. coli in October 2024, that is the only culture that is positive we have on the [facility]'s chart.     Cystoscopy     Date/Time  12/12/2024 8:15 AM     Performed by  Gomez Fletcher MD   Authorized by  Gomez Fletcher MD         Procedure Details:  Procedure type: cystoscopy    Patient tolerance: Patient tolerated the procedure well with no immediate complications    Additional Procedure Details:     Patient presents for cystoscopy.  I described the procedure, answered any questions, and we discussed potential risks and complications.  Patient expressed understanding, and signed an informed consent document.  The patient was carefully placed in lithotomy position on the examining table.  The urethra was prepped with sterile disinfectant.  The 15 [place]ovan flexible cystoscope was passed in the urethra with the following findings:    Urethra: Postoperative changes of gender reassignment surgery.  urethral meatus easily visible and not stenotic.    Normal appearance of prostate urethra, minimal enlargement of lateral lobes     Bladder: Mild sediment makes urine a bit cloudy.    Small, 1 to 2 mm, area of what is likely blood vessels, hemangioma on left lateral wall.  An even smaller lesion near it.  These lesions have nothing papillary to suggest bladder cancer.  No other areas of hemorrhagic cystitis.    Residual urine estimated to be 150 mL.    The patient tolerated the procedure well and was escorted from the examining table.             ASSESSMENT /  "PLAN:    1.  The small lesions are likely hemangioma, but I will repeat cystoscopy in several months to make sure they do not change    2.  They are so small, I think it is unlikely caused as much blood as she is has, she showed me some photographs.    3.  CT report describes gas in the bladder wall, described as emphysematous cystitis.  Possibly this could be related to the culture of October.    4.  No other etiology for blood is seen.  She has GYN exam coming up that will determine if any vaginal reasons for bleeding.    5.  Repeat cystoscopy in several months to look at the small hemangioma.  Biopsy will be considered if any suspicion of them.        Review of Systems      Objective:     Physical Exam      No results found for: \"PSA\"]  BUN   Date Value Ref Range Status   12/05/2024 20 5 - 25 mg/dL Final   02/13/2018 13 7 - 25 mg/dL Final     Creatinine   Date Value Ref Range Status   12/05/2024 0.69 0.60 - 1.30 mg/dL Final     Comment:     Standardized to IDMS reference method   02/13/2018 0.79 0.40 - 1.10 mg/dL Final     No components found for: \"CBC\"    Patient Active Problem List   Diagnosis    Hypothyroidism    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    Hypertriglyceridemia    Type 2 diabetes mellitus without complication, without long-term current use of insulin (HCC)    Tobacco abuse    HTN, goal below 140/90    Primary insomnia    Chronic bilateral low back pain without sciatica    Onychomycosis of left great toe    Other constipation    History of stroke    Gross hematuria        Patient ID: Jo Matthew is a 53 y.o. female.      Current Outpatient Medications:     aspirin (Aspirin Adult Low Strength) 81 mg EC tablet, Take 1 tablet (81 mg total) by mouth daily, Disp: 90 tablet, Rfl: 2    estradiol (ESTRACE) 2 MG tablet, TAKE 1 TABLET BY MOUTH TWICE A DAY, Disp: 180 tablet, Rfl: 3    estradiol valerate (DELESTROGEN) 40 MG/ML injection, Inject 0.25 mL (10 mg total) into a muscle every 14 (fourteen) days, " "Disp: 15 mL, Rfl: 2    fenofibrate (TRICOR) 145 mg tablet, Take 1 tablet (145 mg total) by mouth daily, Disp: 90 tablet, Rfl: 3    glipiZIDE (GLUCOTROL XL) 10 mg 24 hr tablet, Take 1 tablet (10 mg total) by mouth daily, Disp: 90 tablet, Rfl: 3    glucose blood (OneTouch Verio) test strip, 1 each by Other route daily Use as instructed, Disp: 50 each, Rfl: 3    levothyroxine 150 mcg tablet, Take 1 tablet (150 mcg total) by mouth daily, Disp: 90 tablet, Rfl: 10    lidocaine (Lidoderm) 5 %, Apply 1 patch topically over 12 hours daily Remove & Discard patch within 12 hours or as directed by MD, Disp: 30 patch, Rfl: 3    nicotine (NICODERM CQ) 14 mg/24hr TD 24 hr patch, Place 1 patch on the skin over 24 hours every 24 hours, Disp: 28 patch, Rfl: 0    nicotine polacrilex (COMMIT) 4 MG lozenge, Apply 1 lozenge (4 mg total) to the mouth or throat as needed for smoking cessation, Disp: 100 each, Rfl: 0    nicotine polacrilex (NICORETTE) 4 mg gum, Chew 1 each (4 mg total) as needed for smoking cessation, Disp: 90 each, Rfl: 2    rosuvastatin (CRESTOR) 20 MG tablet, Take 1 tablet (20 mg total) by mouth daily, Disp: 90 tablet, Rfl: 3    spironolactone (ALDACTONE) 100 mg tablet, Take 1 tablet (100 mg total) by mouth 2 (two) times a day, Disp: 180 tablet, Rfl: 3    SYRINGE-NEEDLE, DISP, 3 ML (SAFETY-LUCIANO 3CC SYR 21GX1.5\") 21G X 1-1/2\" 3 ML MISC, Use as directed, Disp: 30 each, Rfl: 0    terbinafine (LamISIL) 1 % cream, Apply topically 2 (two) times a day, Disp: 42 g, Rfl: 2    traMADol (Ultram) 50 mg tablet, Take 1 tablet (50 mg total) by mouth daily as needed for moderate pain, Disp: 30 tablet, Rfl: 0    Blood Glucose Monitoring Suppl (ONETOUCH VERIO FLEX SYSTEM) w/Device KIT, by Other route daily (Patient not taking: Reported on 12/12/2024), Disp: 1 kit, Rfl: 0    cyclobenzaprine (FLEXERIL) 10 mg tablet, Take 1 tablet (10 mg total) by mouth 3 (three) times a day as needed for muscle spasms (Patient not taking: Reported on " 12/12/2024), Disp: 30 tablet, Rfl: 1    OneTouch Delica Lancets 33G MISC, by Does not apply route daily (Patient not taking: Reported on 11/19/2023), Disp: 100 each, Rfl: 1    zolpidem (AMBIEN) 5 mg tablet, TAKE 1 TABLET BY MOUTH DAILY AT BEDTIME AS NEEDED FOR SLEEP (Patient not taking: Reported on 12/12/2024), Disp: 30 tablet, Rfl: 0

## 2024-12-20 PROCEDURE — 87086 URINE CULTURE/COLONY COUNT: CPT | Performed by: OBSTETRICS & GYNECOLOGY

## 2024-12-24 ENCOUNTER — RESULTS FOLLOW-UP (OUTPATIENT)
Dept: FAMILY MEDICINE CLINIC | Facility: CLINIC | Age: 54
End: 2024-12-24

## 2024-12-24 DIAGNOSIS — I10 HTN, GOAL BELOW 140/90: Primary | ICD-10-CM

## 2024-12-24 RX ORDER — LOSARTAN POTASSIUM 25 MG/1
25 TABLET ORAL DAILY
Qty: 30 TABLET | Refills: 3 | Status: SHIPPED | OUTPATIENT
Start: 2024-12-24

## 2024-12-30 DIAGNOSIS — Z72.0 TOBACCO ABUSE: ICD-10-CM

## 2025-01-02 RX ORDER — NICOTINE 21 MG/24HR
1 PATCH, TRANSDERMAL 24 HOURS TRANSDERMAL EVERY 24 HOURS
Qty: 28 PATCH | Refills: 0 | Status: SHIPPED | OUTPATIENT
Start: 2025-01-02

## 2025-01-14 ENCOUNTER — OFFICE VISIT (OUTPATIENT)
Dept: FAMILY MEDICINE CLINIC | Facility: CLINIC | Age: 55
End: 2025-01-14

## 2025-01-14 VITALS
OXYGEN SATURATION: 96 % | BODY MASS INDEX: 30.35 KG/M2 | DIASTOLIC BLOOD PRESSURE: 66 MMHG | TEMPERATURE: 98 F | HEART RATE: 97 BPM | WEIGHT: 212 LBS | HEIGHT: 70 IN | SYSTOLIC BLOOD PRESSURE: 150 MMHG | RESPIRATION RATE: 16 BRPM

## 2025-01-14 DIAGNOSIS — I10 HTN, GOAL BELOW 140/90: ICD-10-CM

## 2025-01-14 DIAGNOSIS — R80.9 MICROALBUMINURIA: ICD-10-CM

## 2025-01-14 DIAGNOSIS — Z72.0 TOBACCO ABUSE: ICD-10-CM

## 2025-01-14 DIAGNOSIS — Z23 ENCOUNTER FOR IMMUNIZATION: ICD-10-CM

## 2025-01-14 DIAGNOSIS — E03.9 ACQUIRED HYPOTHYROIDISM: ICD-10-CM

## 2025-01-14 DIAGNOSIS — J40 BRONCHITIS: Primary | ICD-10-CM

## 2025-01-14 DIAGNOSIS — Z12.11 SCREENING FOR COLORECTAL CANCER: ICD-10-CM

## 2025-01-14 DIAGNOSIS — Z12.12 SCREENING FOR COLORECTAL CANCER: ICD-10-CM

## 2025-01-14 DIAGNOSIS — Z00.00 ANNUAL PHYSICAL EXAM: ICD-10-CM

## 2025-01-14 LAB
APOB+LDLR+PCSK9 GENE MUT ANL BLD/T: NOT DETECTED
BRCA1+BRCA2 DEL+DUP + FULL MUT ANL BLD/T: NOT DETECTED
MLH1+MSH2+MSH6+PMS2 GN DEL+DUP+FUL M: NOT DETECTED

## 2025-01-14 PROCEDURE — 90471 IMMUNIZATION ADMIN: CPT

## 2025-01-14 PROCEDURE — 90750 HZV VACC RECOMBINANT IM: CPT

## 2025-01-14 PROCEDURE — 99214 OFFICE O/P EST MOD 30 MIN: CPT | Performed by: FAMILY MEDICINE

## 2025-01-14 PROCEDURE — 99396 PREV VISIT EST AGE 40-64: CPT | Performed by: FAMILY MEDICINE

## 2025-01-14 RX ORDER — LOSARTAN POTASSIUM 25 MG/1
25 TABLET ORAL DAILY
Qty: 30 TABLET | Refills: 3 | Status: SHIPPED | OUTPATIENT
Start: 2025-01-14

## 2025-01-14 RX ORDER — BENZONATATE 200 MG/1
200 CAPSULE ORAL 3 TIMES DAILY PRN
Qty: 20 CAPSULE | Refills: 0 | Status: SHIPPED | OUTPATIENT
Start: 2025-01-14

## 2025-01-14 RX ORDER — AZITHROMYCIN 250 MG/1
TABLET, FILM COATED ORAL
Qty: 6 TABLET | Refills: 0 | Status: SHIPPED | OUTPATIENT
Start: 2025-01-14 | End: 2025-01-19

## 2025-01-14 NOTE — ASSESSMENT & PLAN NOTE
Smokes 1-2 cigarettes per week  Patient is using NRT patch which seems to be helping with cutting back on cigarette smoking  Continue working on smoking cessation

## 2025-01-14 NOTE — ASSESSMENT & PLAN NOTE
Poorly controlled  Will initiate losartan 25 mg daily  Continue spironolactone  Check BMP in 2 weeks   Orders:    losartan (COZAAR) 25 mg tablet; Take 1 tablet (25 mg total) by mouth daily    Basic metabolic panel; Future

## 2025-01-14 NOTE — PROGRESS NOTES
Adult Annual Physical  Name: Jo Matthew      : 1970      MRN: [id number]  Encounter Provider: Angel Bob MD  Encounter Date: 2025   Encounter department: [facility]    Assessment & Plan  Bronchitis  Will provide oral antibiotics  Otherwise recommend supportive care  Monitor for worsening symptoms    Orders:    azithromycin (Zithromax) 250 mg tablet; Take 2 tablets (500 mg total) by mouth daily for 1 day, THEN 1 tablet (250 mg total) daily for 4 days.    benzonatate (TESSALON) 200 MG capsule; Take 1 capsule (200 mg total) by mouth 3 (three) times a day as needed for cough    HTN, goal below 140/90  Poorly controlled  Will initiate losartan 25 mg daily  Continue spironolactone  Check BMP in 2 weeks   Orders:    losartan (COZAAR) 25 mg tablet; Take 1 tablet (25 mg total) by mouth daily    Basic metabolic panel; Future    Tobacco abuse  Smokes 1-2 cigarettes per week  Patient is using NRT patch which seems to be helping with cutting back on cigarette smoking  Continue working on smoking cessation       Microalbuminuria  Labs reviewed which show significant albuminuria  Will start antiproteinuric agent  Optimize glycemic control  Orders:    losartan (COZAAR) 25 mg tablet; Take 1 tablet (25 mg total) by mouth daily    Encounter for immunization    Orders:    Zoster Vaccine Recombinant IM    Annual physical exam         Screening for colorectal cancer    Orders:    Cologuard    Acquired hypothyroidism  Most recent TSH elevated  Continue levothyroxine 150 mcg  Recheck thyroid function and adjust prescription dosage accordingly 150  Orders:    TSH, 3rd generation; Future    T4, free; Future    Immunizations and preventive care screenings were discussed with patient today. Appropriate education was printed on patient's after visit summary.    Counseling:  Dental Health: discussed importance of regular tooth brushing, flossing, and dental visits.  Exercise:  the importance of regular exercise/physical activity was discussed. Recommend exercise 3-5 times per week for at least 30 minutes.       Depression Screening and Follow-up Plan: Patient was screened for depression during today's encounter. They screened negative with a PHQ-2 score of 2.        History of Present Illness     Adult Annual Physical:  Patient presents for annual physical. 54-year-old transgender female with a history of hypertension and type 2 diabetes who presents today for wellness visit.  Patient also reports approximately 2 weeks of progressively worsening cough with yellow sputum production, malaise, and congestion.   She denies any sick contacts.  She has cut back on smoking cigarettes..     Diet and Physical Activity:  - Diet/Nutrition: well balanced diet.  - Exercise: no formal exercise.    Depression Screening:  - PHQ-2 Score: 2    General Health:  - Sleep: sleeps poorly and 1-3 hours of sleep on average.  - Hearing: normal hearing bilateral ears.  - Vision: no vision problems and wears contacts.  - Dental: regular dental visits and brushes teeth once daily.    /GYN Health:  - Follows with GYN: yes.   - History of STDs: no    Advanced Care Planning:  - Has an advanced directive?: yes    - Has a durable medical POA?: yes    - ACP document given to patient?: yes      Review of Systems   Constitutional:  Negative for appetite change, chills, diaphoresis, fatigue and fever.   HENT:  Positive for congestion. Negative for ear pain, hearing loss, sore throat and trouble swallowing.    Eyes:  Negative for visual disturbance.   Respiratory:  Positive for cough. Negative for shortness of breath and wheezing.    Cardiovascular:  Negative for chest pain, palpitations and leg swelling.   Gastrointestinal:  Negative for abdominal pain, nausea and vomiting.   Endocrine: Negative for cold intolerance, heat intolerance, polydipsia, polyphagia and polyuria.   Genitourinary:  Negative for difficulty urinating,  "pelvic pain, vaginal bleeding and vaginal discharge.   Musculoskeletal:  Negative for back pain.   Skin:  Negative for rash.   Neurological:  Negative for dizziness, syncope, weakness, numbness and headaches.   Psychiatric/Behavioral:  Negative for dysphoric mood.          Objective   /66 (BP Location: Right arm, Patient Position: Sitting, Cuff Size: Large)   Pulse 97   Temp 98 °F (36.7 °C) (Temporal)   Resp 16   Ht 5' 10\" (1.778 m)   Wt 96.2 kg (212 lb)   SpO2 96%   BMI 30.42 kg/m²     Physical Exam  Constitutional:       General: She is not in acute distress.     Appearance: Normal appearance. She is well-developed. She is not ill-appearing, toxic-appearing or diaphoretic.   HENT:      Head: Normocephalic and atraumatic.      Right Ear: External ear normal. There is no impacted cerumen.      Left Ear: External ear normal. There is no impacted cerumen.      Nose: Nose normal.      Mouth/Throat:      Mouth: Mucous membranes are moist.      Pharynx: No oropharyngeal exudate.   Eyes:      General:         Right eye: No discharge.         Left eye: No discharge.      Extraocular Movements: Extraocular movements intact.      Pupils: Pupils are equal, round, and reactive to light.   Cardiovascular:      Rate and Rhythm: Normal rate and regular rhythm.      Heart sounds: Normal heart sounds. No murmur heard.     No friction rub. No gallop.   Pulmonary:      Effort: Pulmonary effort is normal. No respiratory distress.      Breath sounds: Normal breath sounds. No stridor. No wheezing or rhonchi.   Abdominal:      General: Bowel sounds are normal. There is no distension.      Palpations: Abdomen is soft. There is no mass.      Tenderness: There is no abdominal tenderness. There is no guarding.   Musculoskeletal:         General: Normal range of motion.      Cervical back: Normal range of motion.      Right lower leg: No edema.      Left lower leg: No edema.   Skin:     General: Skin is warm.      Capillary " Refill: Capillary refill takes less than 2 seconds.      Findings: No lesion or rash.   Neurological:      General: No focal deficit present.      Mental Status: She is alert and oriented to person, place, and time.      Cranial Nerves: No cranial nerve deficit.      Motor: No weakness.      Gait: Gait normal.   Psychiatric:         Mood and Affect: Mood normal.         Behavior: Behavior normal.

## 2025-01-14 NOTE — PATIENT INSTRUCTIONS
"Patient Education     Routine physical for adults   The Basics   Written by the doctors and editors at [facility]   What is a physical? -- A physical is a routine visit, or \"check-up,\" with your doctor. You might also hear it called a \"wellness visit\" or \"preventive visit.\"  During each visit, the doctor will:   Ask about your physical and mental health   Ask about your habits, behaviors, and lifestyle   Do an exam   Give you vaccines if needed   Talk to you about any medicines you take   Give advice about your health   Answer your questions  Getting regular check-ups is an important part of taking care of your health. It can help your doctor find and treat any problems you have. But it's also important for preventing health problems.  A routine physical is different from a \"sick visit.\" A sick visit is when you see a doctor because of a health concern or problem. Since physicals are scheduled ahead of time, you can think about what you want to ask the doctor.  How often should I get a physical? -- It depends on your age and health. In general, for people age 21 years and older:   If you are younger than 50 years, you might be able to get a physical every 3 years.   If you are 50 years or older, your doctor might recommend a physical every year.  If you have an ongoing health condition, like diabetes or high blood pressure, your doctor will probably want to see you more often.  What happens during a physical? -- In general, each visit will include:   Physical exam - The doctor or nurse will check your height, weight, heart rate, and blood pressure. They will also look at your eyes and ears. They will ask about how you are feeling and whether you have any symptoms that bother you.   Medicines - It's a good idea to bring a list of all the medicines you take to each doctor visit. Your doctor will talk to you about your medicines and answer any questions. Tell them if you are having any side effects that bother you. You " "should also tell them if you are having trouble paying for any of your medicines.   Habits and behaviors - This includes:   Your diet   Your exercise habits   Whether you smoke, drink alcohol, or use drugs   Whether you are sexually active   Whether you feel safe at home  Your doctor will talk to you about things you can do to improve your health and lower your risk of health problems. They will also offer help and support. For example, if you want to quit smoking, they can give you advice and might prescribe medicines. If you want to improve your diet or get more physical activity, they can help you with this, too.   Lab tests, if needed - The tests you get will depend on your age and situation. For example, your doctor might want to check your:   Cholesterol   Blood sugar   Iron level   Vaccines - The recommended vaccines will depend on your age, health, and what vaccines you already had. Vaccines are very important because they can prevent certain serious or deadly infections.   Discussion of screening - \"Screening\" means checking for diseases or other health problems before they cause symptoms. Your doctor can recommend screening based on your age, risk, and preferences. This might include tests to check for:   Cancer, such as breast, prostate, cervical, ovarian, colorectal, prostate, lung, or skin cancer   Sexually transmitted infections, such as chlamydia and gonorrhea   Mental health conditions like depression and anxiety  Your doctor will talk to you about the different types of screening tests. They can help you decide which screenings to have. They can also explain what the results might mean.   Answering questions - The physical is a good time to ask the doctor or nurse questions about your health. If needed, they can refer you to other doctors or specialists, too.  Adults older than 65 years often need other care, too. As you get older, your doctor will talk to you about:   How to prevent falling at " home   Hearing or vision tests   Memory testing   How to take your medicines safely   Making sure that you have the help and support you need at home  All topics are updated as new evidence becomes available and our peer review process is complete.  This topic retrieved from irisnote on: May 02, 2024.  Topic 203269 Version 1.0  Release: 32.4.3 - C32.122  © 2024 UpToDate, Inc. and/or its affiliates. All rights reserved.  Consumer Information Use and Disclaimer   Disclaimer: This generalized information is a limited summary of diagnosis, treatment, and/or medication information. It is not meant to be comprehensive and should be used as a tool to help the user understand and/or assess potential diagnostic and treatment options. It does NOT include all information about conditions, treatments, medications, side effects, or risks that may apply to a specific patient. It is not intended to be medical advice or a substitute for the medical advice, diagnosis, or treatment of a health care provider based on the health care provider's examination and assessment of a patient's specific and unique circumstances. Patients must speak with a health care provider for complete information about their health, medical questions, and treatment options, including any risks or benefits regarding use of medications. This information does not endorse any treatments or medications as safe, effective, or approved for treating a specific patient. UpToDate, Inc. and its affiliates disclaim any warranty or liability relating to this information or the use thereof.The use of this information is governed by the Terms of Use, available at https://www.woltersGood Seeduwer.com/en/know/clinical-effectiveness-terms. 2024© UpToDate, Inc. and its affiliates and/or licensors. All rights reserved.  Copyright   © 2024 UpToDate, Inc. and/or its affiliates. All rights reserved.    Patient Education     Quitting smoking   The Basics   Written by the doctors and  "editors at [employer]   What are the benefits of quitting smoking? -- Quitting smoking can dramatically improve your health and help you live longer. It lowers your risk of heart disease, lung disease, kidney failure, cancer, infection, stomach problems, and diabetes.  Quitting smoking can also lower your chances of getting osteoporosis, a condition that makes your bones weak.  Quitting is not easy for most people, and it might take several tries to completely quit. But help and support are available. Quitting smoking will improve your health no matter how old you are, even if you have smoked for a long time.  What should I do if I want to quit smoking? -- It's a good idea to start by talking with your doctor or nurse. It is possible to quit on your own, without help. But getting help greatly increases your chances of quitting successfully.  When you are ready to quit, you will make a plan to:   Set a quit date.   Tell your family and friends that you plan to quit.   Plan ahead for the challenges you will face, such as cigarette cravings.   Remove cigarettes from your home, car, and work.  How can my doctor or nurse help? -- Your doctor or nurse can give you advice on the best way to quit. They can also give you medicines to:   Reduce your craving for cigarettes   Reduce your \"withdrawal\" symptoms (symptoms that happen when you stop smoking)  Your doctor or nurse can also help you find a counselor to talk to. For most people who are trying to quit smoking, it works best to use both medicines and counseling.  You can also get help from a free phone line (9-719-QUIT-NOW, or 1-703.866.9648) or go online to www.smokefree.gov.  What are the symptoms of withdrawal? -- When you stop smoking, you might have symptoms such as:   Trouble sleeping   Feeling irritable, anxious, or restless   Getting frustrated or angry   Having trouble thinking clearly  These symptoms can be hard to deal with, which is why it can be so hard to " quit. But medicines can help.  Some people who stop smoking become temporarily depressed. Some people need treatment for depression, such as counseling or medicines or both. People with depression might:   No longer enjoy or care about doing the things they used to like to do   Feel sad, down, hopeless, nervous, or cranky most of the day, almost every day   Lose or gain weight   Sleep too much or too little   Feel tired or like they have no energy   Feel guilty or like they are worth nothing   Forget things or feel confused   Move and speak more slowly than usual   Act restless or have trouble staying still   Think about death or suicide  If you think you might be depressed, tell your doctor or nurse right away. They can talk to you about your symptoms and recommend treatment if needed.  Get help right away if you are thinking of hurting or killing yourself! -- Sometimes, people with depression think of hurting or killing themselves. If you ever feel like you might hurt yourself, help is available:   In the , contact the [facility] Suicide [facility] Crisis Lifeline:   To speak to someone, call or text [facility].   To talk to someone online, go to www.FORA.tv.org/chat.   Call your doctor or nurse, and tell them that it is an emergency.   Call for an ambulance (in the [place] and [place], call 9-1-1).   Go to the emergency department at your local hospital.  If you think your partner might have depression, or if you are worried that they might hurt themselves, get them help right away.  How does counseling work? -- A counselor can help you figure out:   What triggers you to want to smoke, and how to handle these situations   How to resist cravings   What you can do differently if you have tried to quit before  You can meet with a counselor in 1-on-1 sessions or as part of a group. There are other ways to get counseling, too, such as over the phone, through text messaging, or online.  How do medicines help you stop smoking? -- Different  "medicines work in different ways:   Nicotine replacement therapy - Nicotine is the main drug in cigarettes, and the reason they are addictive. These medicines reduce your body's craving for nicotine. They also help with withdrawal symptoms.  There are different forms of nicotine replacement, including skin patches, lozenges, gum, nasal sprays, and inhalers. Most can be bought without a prescription. Also, health insurance might cover some or all of the cost.  It often helps to use 2 forms of nicotine replacement. For example, you might wear a patch all the time, plus use gum or lozenges when you get a craving to smoke.   Varenicline - Varenicline (brand names: Chantix, Champix) is a prescription medicine that reduces withdrawal symptoms and cigarette cravings. Varenicline can increase the effects of alcohol in some people. It's a good idea to limit drinking while you're taking it, at least until you know how it affects you.  Even if you are not yet ready to commit to a quit date, varenicline can help reduce cravings. This can make it easier to quit when you are ready.   Bupropion - Bupropion (sample brand names: Wellbutrin, Zyban) is a prescription medicine that reduces your desire to smoke. It is also available in a generic version, which is cheaper than the brand name medicines. Doctors do not usually prescribe bupropion for people with seizures or who have had seizures in the past.  It might also be helpful to combine nicotine replacement with bupropion or varenicline. In some cases, a person might even take both bupropion and varenicline. Your doctor or nurse can help you figure out the best combination for you.  Can vaping help me quit? -- Sometimes, people wonder if vaping can help them quit smoking. Vaping is using electronic cigarettes, or \"e-cigarettes.\"  Doctors recommend using medicines and counseling to quit smoking. These methods have been studied the most. But for people who have tried these and not " been able to quit, switching to vaping might be an option. There are some things to remember:   Vaping might be less harmful than smoking regular cigarettes. But e-cigarettes still contain nicotine as well as other substances that might be harmful.   If you decide to try vaping to help you quit, it's important to switch completely from regular cigarettes to e-cigarettes. Using both will probably not be helpful, and might increase the risks of harm.   It's not clear how vaping can affect a person's health in the long term.  For these reasons, doctors recommend that if you do try vaping to help you quit smoking, you still make a plan to quit vaping eventually.  If you are interested in trying vaping to help you quit smoking, it's a good idea to talk to your doctor or nurse first.  What if I am pregnant and I smoke? -- If you are pregnant, it's really important for the health of your baby that you quit. Ask your doctor what options you have, and what is safest for your baby.  What if I have already smoked for a long time? -- The longer you have smoked, the higher your chances are of having health problems. But it is never too late to quit smoking. It helps your health even if you are older or have smoked for many years. The best thing you can do to lower your chance of having a health problem caused by smoking is to quit.  Will I gain weight if I quit? -- You might gain a few pounds. This can be frustrating for some people. But it's important to remember that you are improving your health by quitting smoking. You can help prevent gaining a lot of weight by staying active and eating a healthy diet.  What if I am not able to quit? -- If you don't quit on your first try, or if you quit but then start smoking again, don't give up hope. Lots of people have to try more than once before they are able to completely quit.  It might help to try to understand why quitting did not work. There might be something you can do  differently when you try again. It can help to figure out which situations make you want to smoke, so you can avoid them.  What else can I do to improve my chances of quitting? -- You can:   Get regular exercise. Any type of physical activity, even gentle forms of movement, is good for your health. Physical activity can also help reduce stress.   Stay away from people who smoke and places that make you want to smoke. If people close to you smoke, ask them to quit with you or avoid smoking around you.   Carry gum, hard candy, or something to put in your mouth. If you get a craving for a cigarette, try 1 of these instead.   Don't give up, even if you start smoking again. It takes most people a few tries before they succeed.  All topics are updated as new evidence becomes available and our peer review process is complete.  This topic retrieved from [employer] on: Mar 14, 2024.  Topic 00943 Version 33.0  Release: 32.2.4 - C32.72  © 2024 UpToDate, Inc. and/or its affiliates. All rights reserved.  Consumer Information Use and Disclaimer   Disclaimer: This generalized information is a limited summary of diagnosis, treatment, and/or medication information. It is not meant to be comprehensive and should be used as a tool to help the user understand and/or assess potential diagnostic and treatment options. It does NOT include all information about conditions, treatments, medications, side effects, or risks that may apply to a specific patient. It is not intended to be medical advice or a substitute for the medical advice, diagnosis, or treatment of a health care provider based on the health care provider's examination and assessment of a patient's specific and unique circumstances. Patients must speak with a health care provider for complete information about their health, medical questions, and treatment options, including any risks or benefits regarding use of medications. This information does not endorse any treatments or  medications as safe, effective, or approved for treating a specific patient. UpToDate, Inc. and its affiliates disclaim any warranty or liability relating to this information or the use thereof.The use of this information is governed by the Terms of Use, available at https://www.SwitchNote.com/en/know/clinical-effectiveness-terms. 2024© UpToDate, Inc. and its affiliates and/or licensors. All rights reserved.  Copyright   © 2024 UpToDate, Inc. and/or its affiliates. All rights reserved.      -Check blood work in 2 weeks

## 2025-01-14 NOTE — ASSESSMENT & PLAN NOTE
Most recent TSH elevated  Continue levothyroxine 150 mcg  Recheck thyroid function and adjust prescription dosage accordingly 150  Orders:    TSH, 3rd generation; Future    T4, free; Future

## 2025-01-14 NOTE — ASSESSMENT & PLAN NOTE
Labs reviewed which show significant albuminuria  Will start antiproteinuric agent  Optimize glycemic control  Orders:    losartan (COZAAR) 25 mg tablet; Take 1 tablet (25 mg total) by mouth daily

## 2025-02-05 DIAGNOSIS — R80.9 MICROALBUMINURIA: ICD-10-CM

## 2025-02-05 DIAGNOSIS — I10 HTN, GOAL BELOW 140/90: ICD-10-CM

## 2025-02-05 RX ORDER — LOSARTAN POTASSIUM 25 MG/1
25 TABLET ORAL DAILY
Qty: 90 TABLET | Refills: 2 | Status: SHIPPED | OUTPATIENT
Start: 2025-02-05

## 2025-02-08 LAB — COLOGUARD RESULT REPORTABLE: NEGATIVE

## 2025-03-07 PROCEDURE — 87086 URINE CULTURE/COLONY COUNT: CPT | Performed by: OBSTETRICS & GYNECOLOGY

## 2025-04-14 ENCOUNTER — TELEPHONE (OUTPATIENT)
Dept: OBGYN CLINIC | Facility: CLINIC | Age: 55
End: 2025-04-14

## 2025-04-14 NOTE — TELEPHONE ENCOUNTER
Pt called into the office requesting provider to send medication for an uti, symptoms of burning during urination and pain. Pt stated that it's hard for her to drive. Pt would like for the medication to be sent to the [facility] [place]

## 2025-04-15 ENCOUNTER — NURSE TRIAGE (OUTPATIENT)
Dept: OTHER | Facility: OTHER | Age: 55
End: 2025-04-15

## 2025-04-15 DIAGNOSIS — N30.00 ACUTE CYSTITIS WITHOUT HEMATURIA: Primary | ICD-10-CM

## 2025-04-15 DIAGNOSIS — N49.1 ABSCESS OF TUNICA VAGINALIS: ICD-10-CM

## 2025-04-15 RX ORDER — SULFAMETHOXAZOLE AND TRIMETHOPRIM 800; 160 MG/1; MG/1
1 TABLET ORAL EVERY 12 HOURS SCHEDULED
Qty: 10 TABLET | Refills: 0 | Status: SHIPPED | OUTPATIENT
Start: 2025-04-15 | End: 2025-04-18

## 2025-04-15 RX ORDER — SULFAMETHOXAZOLE AND TRIMETHOPRIM 800; 160 MG/1; MG/1
1 TABLET ORAL EVERY 12 HOURS SCHEDULED
Qty: 10 TABLET | Refills: 0 | Status: SHIPPED | OUTPATIENT
Start: 2025-04-15 | End: 2025-04-15

## 2025-04-15 NOTE — TELEPHONE ENCOUNTER
"FOLLOW UP: Please follow up with patient when office is open regarding prescription request for antibiotic or appointment if needed.    REASON FOR CONVERSATION: Possible UTI    SYMPTOMS: Patient has been experiencing UTI symptoms. Pain 5-6/10, burning, frequency of urination. This morning she woke up with labial swelling and bleeding. Denies fever.     OTHER: Patient is requesting an antibiotic to be called to [facility] [id number] [facility] [place]   [address], [place] [address].    Home care advice provided and call back precautions reviewed. Patient verbalized understanding.    DISPOSITION: See PCP Within 24 Hours      Reason for Disposition  • Age > 50 years    Answer Assessment - Initial Assessment Questions  1. BLEEDING SEVERITY: \"Describe the bleeding that you are having.\" \"How much bleeding is there?\"         Light pink. \"Half dollar\"    2. ONSET: \"When did the bleeding begin?\" \"Is it continuing now?\"        This morning    5. ABDOMEN PAIN: \"Do you have any pain?\" \"How bad is the pain?\"  (e.g., Scale 0-10; none, mild, moderate, or severe)        \"It hurts\" 5-6/10    9. BLOOD THINNER MEDICINES: \"Do you take any blood thinners?\" (e.g., Coumadin / warfarin, Pradaxa / dabigatran, aspirin)        Aspirin    10. CAUSE: \"What do you think is causing the bleeding?\" (e.g., recent gyn surgery, recent gyn procedure; known bleeding disorder, cervical cancer, polycystic ovarian disease, fibroids)          Patient is concerned that she has a UTI    11. HEMODYNAMIC STATUS: \"Are you weak or feeling lightheaded?\" If Yes, ask: \"Can you stand and walk normally?\"           Denies feeling weak/lightheaded    12. OTHER SYMPTOMS: \"What other symptoms are you having with the bleeding?\" (e.g., passed tissue, vaginal discharge, fever, menstrual-type cramps)          Labial swelling, pain with urination, frequency and burning.    Patient states this feels similar to UTI's in the past and is requesting an antibiotic be called in because " provider has done this in the past.    Protocols used: Vaginal Bleeding - Abnormal-Adult-AH, Urination Pain - Female-Adult-AH

## 2025-04-18 ENCOUNTER — OFFICE VISIT (OUTPATIENT)
Dept: OBGYN CLINIC | Facility: CLINIC | Age: 55
End: 2025-04-18

## 2025-04-18 VITALS
WEIGHT: 219 LBS | HEIGHT: 70 IN | SYSTOLIC BLOOD PRESSURE: 147 MMHG | BODY MASS INDEX: 31.35 KG/M2 | RESPIRATION RATE: 18 BRPM | DIASTOLIC BLOOD PRESSURE: 88 MMHG | HEART RATE: 102 BPM

## 2025-04-18 DIAGNOSIS — N76.4 ABSCESS, VULVA: ICD-10-CM

## 2025-04-18 DIAGNOSIS — N49.1 ABSCESS OF TUNICA VAGINALIS: Primary | ICD-10-CM

## 2025-04-18 PROCEDURE — 87205 SMEAR GRAM STAIN: CPT | Performed by: OBSTETRICS & GYNECOLOGY

## 2025-04-18 PROCEDURE — 87070 CULTURE OTHR SPECIMN AEROBIC: CPT | Performed by: OBSTETRICS & GYNECOLOGY

## 2025-04-18 RX ORDER — SULFAMETHOXAZOLE AND TRIMETHOPRIM 800; 160 MG/1; MG/1
1 TABLET ORAL EVERY 12 HOURS SCHEDULED
Qty: 20 TABLET | Refills: 0 | Status: SHIPPED | OUTPATIENT
Start: 2025-04-18 | End: 2025-04-21

## 2025-04-18 NOTE — PROGRESS NOTES
"Name: Jo Matthew      : 1970      MRN: [id number]  Encounter Provider: Sohail Quintana  Encounter Date: 2025   Encounter department: [facility]  :  Assessment & Plan  Abscess, vulva  Small anterior vulvar abscess  TGF going to [facility] for vaginal revision  Plan  Continue Bactrim  Warm soaks  to area  Culture sent           History of Present Illness   HPI  Jo Matthew is a 54 y.o.TGF who presents who is complaining of tender painful vagina.  She noticed small amount of blood and discharge that was collecting in her underwear.  Should be noted patient is to see urology/plastic/urogynecology at [facility] this May 20.      Review of Systems       Objective   /88 (BP Location: Left arm, Patient Position: Sitting, Cuff Size: Large)   Pulse 102   Resp 18   Ht 5' 10\" (1.778 m)   Wt 99.3 kg (219 lb)   BMI 31.42 kg/m²      Physical Exam  Exam conducted with a chaperone present.   Genitourinary:     Exam position: Lithotomy position.          Comments: Small area above the urethra, small abscess that is currently draining. Note this is TGF with vaginal plasty.          "

## 2025-04-20 LAB
BACTERIA WND AEROBE CULT: ABNORMAL
GRAM STN SPEC: ABNORMAL

## 2025-04-21 RX ORDER — SULFAMETHOXAZOLE AND TRIMETHOPRIM 800; 160 MG/1; MG/1
1 TABLET ORAL EVERY 12 HOURS SCHEDULED
Qty: 20 TABLET | Refills: 0 | Status: SHIPPED | OUTPATIENT
Start: 2025-04-21 | End: 2025-05-01

## 2025-04-29 DIAGNOSIS — E03.9 ACQUIRED HYPOTHYROIDISM: ICD-10-CM

## 2025-04-29 DIAGNOSIS — Z78.9 MALE-TO-FEMALE TRANSGENDER PERSON: ICD-10-CM

## 2025-04-29 DIAGNOSIS — E11.9 TYPE 2 DIABETES MELLITUS WITHOUT COMPLICATION, WITHOUT LONG-TERM CURRENT USE OF INSULIN (HCC): ICD-10-CM

## 2025-04-29 RX ORDER — ASPIRIN 81 MG/1
81 TABLET, COATED ORAL DAILY
Qty: 90 TABLET | Refills: 2 | Status: SHIPPED | OUTPATIENT
Start: 2025-04-29

## 2025-04-29 RX ORDER — LEVOTHYROXINE SODIUM 150 UG/1
150 TABLET ORAL DAILY
Qty: 90 TABLET | Refills: 10 | Status: SHIPPED | OUTPATIENT
Start: 2025-04-29

## 2025-04-29 RX ORDER — GLIPIZIDE 10 MG/1
10 TABLET, FILM COATED, EXTENDED RELEASE ORAL DAILY
Qty: 90 TABLET | Refills: 3 | Status: SHIPPED | OUTPATIENT
Start: 2025-04-29

## 2025-04-29 RX ORDER — SPIRONOLACTONE 100 MG/1
100 TABLET, FILM COATED ORAL 2 TIMES DAILY
Qty: 180 TABLET | Refills: 3 | Status: SHIPPED | OUTPATIENT
Start: 2025-04-29

## 2025-07-28 DIAGNOSIS — Z72.0 TOBACCO ABUSE: ICD-10-CM

## 2025-07-28 RX ORDER — NICOTINE 21 MG/24HR
1 PATCH, TRANSDERMAL 24 HOURS TRANSDERMAL EVERY 24 HOURS
Qty: 28 PATCH | Refills: 0 | Status: SHIPPED | OUTPATIENT
Start: 2025-07-28

## 2025-08-14 ENCOUNTER — TELEPHONE (OUTPATIENT)
Dept: OTHER | Facility: OTHER | Age: 55
End: 2025-08-14

## 2025-08-18 ENCOUNTER — OFFICE VISIT (OUTPATIENT)
Dept: FAMILY MEDICINE CLINIC | Facility: CLINIC | Age: 55
End: 2025-08-18

## 2025-08-18 VITALS
DIASTOLIC BLOOD PRESSURE: 90 MMHG | RESPIRATION RATE: 16 BRPM | BODY MASS INDEX: 30.92 KG/M2 | SYSTOLIC BLOOD PRESSURE: 140 MMHG | TEMPERATURE: 97.6 F | HEIGHT: 70 IN | WEIGHT: 216 LBS

## 2025-08-18 DIAGNOSIS — R80.9 MICROALBUMINURIA: ICD-10-CM

## 2025-08-18 DIAGNOSIS — Z86.73 HISTORY OF STROKE: ICD-10-CM

## 2025-08-18 DIAGNOSIS — Z72.0 TOBACCO ABUSE: ICD-10-CM

## 2025-08-18 DIAGNOSIS — E03.9 ACQUIRED HYPOTHYROIDISM: ICD-10-CM

## 2025-08-18 DIAGNOSIS — G89.29 CHRONIC BILATERAL LOW BACK PAIN WITHOUT SCIATICA: Primary | ICD-10-CM

## 2025-08-18 DIAGNOSIS — I10 HTN, GOAL BELOW 140/90: ICD-10-CM

## 2025-08-18 DIAGNOSIS — M54.50 CHRONIC BILATERAL LOW BACK PAIN WITHOUT SCIATICA: Primary | ICD-10-CM

## 2025-08-18 DIAGNOSIS — E78.1 HYPERTRIGLYCERIDEMIA: ICD-10-CM

## 2025-08-18 DIAGNOSIS — E11.9 TYPE 2 DIABETES MELLITUS WITHOUT COMPLICATION, WITHOUT LONG-TERM CURRENT USE OF INSULIN (HCC): ICD-10-CM

## 2025-08-18 LAB — SL AMB POCT HEMOGLOBIN AIC: 12 (ref ?–6.5)

## 2025-08-18 PROCEDURE — 99214 OFFICE O/P EST MOD 30 MIN: CPT | Performed by: FAMILY MEDICINE

## 2025-08-18 PROCEDURE — 83036 HEMOGLOBIN GLYCOSYLATED A1C: CPT | Performed by: FAMILY MEDICINE

## 2025-08-18 RX ORDER — TRAMADOL HYDROCHLORIDE 50 MG/1
50 TABLET ORAL DAILY PRN
Qty: 30 TABLET | Refills: 0 | Status: SHIPPED | OUTPATIENT
Start: 2025-08-18

## 2025-08-18 RX ORDER — GLIPIZIDE 10 MG/1
10 TABLET, FILM COATED, EXTENDED RELEASE ORAL DAILY
Qty: 90 TABLET | Refills: 3 | Status: SHIPPED | OUTPATIENT
Start: 2025-08-18

## 2025-08-18 RX ORDER — LOSARTAN POTASSIUM 50 MG/1
50 TABLET ORAL DAILY
Qty: 30 TABLET | Refills: 2 | Status: SHIPPED | OUTPATIENT
Start: 2025-08-18

## 2025-08-18 RX ORDER — LANCETS 33 GAUGE
EACH MISCELLANEOUS
Qty: 100 EACH | Refills: 3 | Status: SHIPPED | OUTPATIENT
Start: 2025-08-18

## 2025-08-18 RX ORDER — BLOOD SUGAR DIAGNOSTIC
STRIP MISCELLANEOUS
Qty: 100 EACH | Refills: 3 | Status: SHIPPED | OUTPATIENT
Start: 2025-08-18

## 2025-08-18 RX ORDER — ROSUVASTATIN CALCIUM 20 MG/1
20 TABLET, COATED ORAL DAILY
Qty: 90 TABLET | Refills: 3 | Status: SHIPPED | OUTPATIENT
Start: 2025-08-18

## 2025-08-18 RX ORDER — BLOOD-GLUCOSE METER
KIT MISCELLANEOUS
Qty: 1 KIT | Refills: 0 | Status: SHIPPED | OUTPATIENT
Start: 2025-08-18